# Patient Record
Sex: FEMALE | Race: BLACK OR AFRICAN AMERICAN | NOT HISPANIC OR LATINO | URBAN - METROPOLITAN AREA
[De-identification: names, ages, dates, MRNs, and addresses within clinical notes are randomized per-mention and may not be internally consistent; named-entity substitution may affect disease eponyms.]

---

## 2022-04-19 ENCOUNTER — OFFICE VISIT (OUTPATIENT)
Dept: FAMILY MEDICINE | Facility: CLINIC | Age: 77
End: 2022-04-19
Payer: COMMERCIAL

## 2022-04-19 VITALS
TEMPERATURE: 96 F | SYSTOLIC BLOOD PRESSURE: 128 MMHG | WEIGHT: 136 LBS | OXYGEN SATURATION: 99 % | HEIGHT: 69 IN | DIASTOLIC BLOOD PRESSURE: 74 MMHG | HEART RATE: 79 BPM | BODY MASS INDEX: 20.14 KG/M2

## 2022-04-19 DIAGNOSIS — E11.59 HYPERTENSION ASSOCIATED WITH DIABETES: ICD-10-CM

## 2022-04-19 DIAGNOSIS — E11.65 TYPE 2 DIABETES MELLITUS WITH HYPERGLYCEMIA, WITHOUT LONG-TERM CURRENT USE OF INSULIN: ICD-10-CM

## 2022-04-19 DIAGNOSIS — Z00.00 ENCOUNTER FOR MEDICAL EXAMINATION TO ESTABLISH CARE: Primary | ICD-10-CM

## 2022-04-19 DIAGNOSIS — E78.5 HYPERLIPIDEMIA ASSOCIATED WITH TYPE 2 DIABETES MELLITUS: ICD-10-CM

## 2022-04-19 DIAGNOSIS — I15.2 HYPERTENSION ASSOCIATED WITH DIABETES: ICD-10-CM

## 2022-04-19 DIAGNOSIS — N18.31 STAGE 3A CHRONIC KIDNEY DISEASE: ICD-10-CM

## 2022-04-19 DIAGNOSIS — E11.69 HYPERLIPIDEMIA ASSOCIATED WITH TYPE 2 DIABETES MELLITUS: ICD-10-CM

## 2022-04-19 DIAGNOSIS — Z79.899 ENCOUNTER FOR LONG-TERM (CURRENT) USE OF MEDICATIONS: ICD-10-CM

## 2022-04-19 PROBLEM — E44.0 PROTEIN-CALORIE MALNUTRITION, MODERATE: Status: ACTIVE | Noted: 2020-02-24

## 2022-04-19 PROBLEM — I50.32 CHRONIC DIASTOLIC CONGESTIVE HEART FAILURE: Status: ACTIVE | Noted: 2021-09-28

## 2022-04-19 PROBLEM — R30.0 DYSURIA: Status: ACTIVE | Noted: 2017-04-13

## 2022-04-19 PROBLEM — G31.84 MILD COGNITIVE IMPAIRMENT: Status: ACTIVE | Noted: 2019-12-18

## 2022-04-19 PROBLEM — R63.4 UNEXPLAINED WEIGHT LOSS: Status: ACTIVE | Noted: 2022-04-19

## 2022-04-19 PROBLEM — Z87.09 HISTORY OF ASTHMA: Status: ACTIVE | Noted: 2021-06-29

## 2022-04-19 PROBLEM — N17.9 ACUTE RENAL FAILURE: Status: ACTIVE | Noted: 2022-03-19

## 2022-04-19 PROBLEM — I25.2 OLD MYOCARDIAL INFARCTION: Status: ACTIVE | Noted: 2020-06-08

## 2022-04-19 PROBLEM — G43.909 MIGRAINE: Status: ACTIVE | Noted: 2019-07-25

## 2022-04-19 PROBLEM — M54.32 SCIATICA, LEFT SIDE: Status: ACTIVE | Noted: 2021-07-29

## 2022-04-19 PROBLEM — I11.9 BENIGN HYPERTENSIVE HEART DISEASE: Status: ACTIVE | Noted: 2020-02-21

## 2022-04-19 PROBLEM — M10.9 GOUT: Status: ACTIVE | Noted: 2022-04-19

## 2022-04-19 PROBLEM — N39.0 SEPSIS DUE TO GRAM-NEGATIVE UTI: Status: ACTIVE | Noted: 2021-06-29

## 2022-04-19 PROBLEM — R55 SYNCOPE: Status: ACTIVE | Noted: 2020-02-21

## 2022-04-19 PROBLEM — J96.01 ACUTE RESPIRATORY FAILURE WITH HYPOXIA: Status: ACTIVE | Noted: 2020-06-10

## 2022-04-19 PROBLEM — N17.9 AKI (ACUTE KIDNEY INJURY): Status: ACTIVE | Noted: 2019-12-15

## 2022-04-19 PROBLEM — M79.609 PAIN IN LIMB: Status: ACTIVE | Noted: 2022-04-19

## 2022-04-19 PROBLEM — E78.2 MIXED HYPERLIPIDEMIA: Status: ACTIVE | Noted: 2020-02-21

## 2022-04-19 PROBLEM — E83.42 HYPOMAGNESEMIA: Status: ACTIVE | Noted: 2020-02-21

## 2022-04-19 PROBLEM — Z95.0 CARDIAC PACEMAKER IN SITU: Status: ACTIVE | Noted: 2020-09-02

## 2022-04-19 PROBLEM — R03.0 ELEVATED BLOOD-PRESSURE READING WITHOUT DIAGNOSIS OF HYPERTENSION: Status: ACTIVE | Noted: 2022-04-19

## 2022-04-19 PROBLEM — I50.9 ACUTE ON CHRONIC CONGESTIVE HEART FAILURE: Status: ACTIVE | Noted: 2020-06-16

## 2022-04-19 PROBLEM — R29.6 FREQUENT FALLS: Status: ACTIVE | Noted: 2021-06-28

## 2022-04-19 PROBLEM — R00.2 PALPITATIONS: Status: ACTIVE | Noted: 2022-04-19

## 2022-04-19 PROBLEM — R40.2430 GLASGOW COMA SCALE TOTAL SCORE 3-8: Status: ACTIVE | Noted: 2020-02-22

## 2022-04-19 PROBLEM — I10 BENIGN HYPERTENSION: Status: ACTIVE | Noted: 2018-04-18

## 2022-04-19 PROBLEM — R40.4 TRANSIENT ALTERATION OF AWARENESS: Status: ACTIVE | Noted: 2017-12-29

## 2022-04-19 PROBLEM — I63.9 CEREBROVASCULAR ACCIDENT (CVA): Status: ACTIVE | Noted: 2022-04-19

## 2022-04-19 PROBLEM — A41.50 SEPSIS DUE TO GRAM-NEGATIVE UTI: Status: ACTIVE | Noted: 2021-06-29

## 2022-04-19 PROBLEM — N39.0 ACUTE UTI: Status: ACTIVE | Noted: 2021-05-29

## 2022-04-19 PROBLEM — Z91.148 NONCOMPLIANCE WITH MEDICATION REGIMEN: Status: ACTIVE | Noted: 2018-04-18

## 2022-04-19 PROBLEM — Z86.73 HISTORY OF CVA (CEREBROVASCULAR ACCIDENT): Status: ACTIVE | Noted: 2019-12-18

## 2022-04-19 PROBLEM — E78.00 PURE HYPERCHOLESTEROLEMIA: Status: ACTIVE | Noted: 2018-04-18

## 2022-04-19 PROBLEM — R53.1 GENERALIZED WEAKNESS: Status: ACTIVE | Noted: 2021-05-29

## 2022-04-19 PROBLEM — I67.89 ISCHEMIC ENCEPHALOPATHY: Status: ACTIVE | Noted: 2018-01-04

## 2022-04-19 PROBLEM — E87.1 HYPONATREMIA: Status: ACTIVE | Noted: 2019-12-15

## 2022-04-19 PROBLEM — K21.9 GASTROESOPHAGEAL REFLUX DISEASE WITHOUT ESOPHAGITIS: Status: ACTIVE | Noted: 2021-06-29

## 2022-04-19 PROBLEM — G93.41 METABOLIC ENCEPHALOPATHY: Status: ACTIVE | Noted: 2020-02-22

## 2022-04-19 PROBLEM — S42.402A LEFT ELBOW FRACTURE, CLOSED, INITIAL ENCOUNTER: Status: ACTIVE | Noted: 2021-06-29

## 2022-04-19 PROBLEM — I25.10 CORONARY ARTERIOSCLEROSIS IN NATIVE ARTERY: Status: ACTIVE | Noted: 2018-04-18

## 2022-04-19 PROCEDURE — 3078F PR MOST RECENT DIASTOLIC BLOOD PRESSURE < 80 MM HG: ICD-10-PCS | Mod: CPTII,S$GLB,, | Performed by: FAMILY MEDICINE

## 2022-04-19 PROCEDURE — 1159F PR MEDICATION LIST DOCUMENTED IN MEDICAL RECORD: ICD-10-PCS | Mod: CPTII,S$GLB,, | Performed by: FAMILY MEDICINE

## 2022-04-19 PROCEDURE — 3074F PR MOST RECENT SYSTOLIC BLOOD PRESSURE < 130 MM HG: ICD-10-PCS | Mod: CPTII,S$GLB,, | Performed by: FAMILY MEDICINE

## 2022-04-19 PROCEDURE — 1160F PR REVIEW ALL MEDS BY PRESCRIBER/CLIN PHARMACIST DOCUMENTED: ICD-10-PCS | Mod: CPTII,S$GLB,, | Performed by: FAMILY MEDICINE

## 2022-04-19 PROCEDURE — 1101F PT FALLS ASSESS-DOCD LE1/YR: CPT | Mod: CPTII,S$GLB,, | Performed by: FAMILY MEDICINE

## 2022-04-19 PROCEDURE — 1160F RVW MEDS BY RX/DR IN RCRD: CPT | Mod: CPTII,S$GLB,, | Performed by: FAMILY MEDICINE

## 2022-04-19 PROCEDURE — 99999 PR PBB SHADOW E&M-NEW PATIENT-LVL V: ICD-10-PCS | Mod: PBBFAC,,, | Performed by: FAMILY MEDICINE

## 2022-04-19 PROCEDURE — 3074F SYST BP LT 130 MM HG: CPT | Mod: CPTII,S$GLB,, | Performed by: FAMILY MEDICINE

## 2022-04-19 PROCEDURE — 1101F PR PT FALLS ASSESS DOC 0-1 FALLS W/OUT INJ PAST YR: ICD-10-PCS | Mod: CPTII,S$GLB,, | Performed by: FAMILY MEDICINE

## 2022-04-19 PROCEDURE — 99204 OFFICE O/P NEW MOD 45 MIN: CPT | Mod: S$GLB,,, | Performed by: FAMILY MEDICINE

## 2022-04-19 PROCEDURE — 3078F DIAST BP <80 MM HG: CPT | Mod: CPTII,S$GLB,, | Performed by: FAMILY MEDICINE

## 2022-04-19 PROCEDURE — 3288F FALL RISK ASSESSMENT DOCD: CPT | Mod: CPTII,S$GLB,, | Performed by: FAMILY MEDICINE

## 2022-04-19 PROCEDURE — 1126F PR PAIN SEVERITY QUANTIFIED, NO PAIN PRESENT: ICD-10-PCS | Mod: CPTII,S$GLB,, | Performed by: FAMILY MEDICINE

## 2022-04-19 PROCEDURE — 1159F MED LIST DOCD IN RCRD: CPT | Mod: CPTII,S$GLB,, | Performed by: FAMILY MEDICINE

## 2022-04-19 PROCEDURE — 1126F AMNT PAIN NOTED NONE PRSNT: CPT | Mod: CPTII,S$GLB,, | Performed by: FAMILY MEDICINE

## 2022-04-19 PROCEDURE — 99204 PR OFFICE/OUTPT VISIT, NEW, LEVL IV, 45-59 MIN: ICD-10-PCS | Mod: S$GLB,,, | Performed by: FAMILY MEDICINE

## 2022-04-19 PROCEDURE — 3288F PR FALLS RISK ASSESSMENT DOCUMENTED: ICD-10-PCS | Mod: CPTII,S$GLB,, | Performed by: FAMILY MEDICINE

## 2022-04-19 PROCEDURE — 99999 PR PBB SHADOW E&M-NEW PATIENT-LVL V: CPT | Mod: PBBFAC,,, | Performed by: FAMILY MEDICINE

## 2022-04-19 RX ORDER — ATORVASTATIN CALCIUM 80 MG/1
80 TABLET, FILM COATED ORAL DAILY
COMMUNITY
Start: 2022-03-23 | End: 2023-02-08 | Stop reason: SDUPTHER

## 2022-04-19 RX ORDER — ASPIRIN 81 MG/1
TABLET ORAL
COMMUNITY
Start: 2021-07-03 | End: 2022-07-19 | Stop reason: SDUPTHER

## 2022-04-19 RX ORDER — LANCETS
EACH MISCELLANEOUS
COMMUNITY
Start: 2022-03-02 | End: 2022-10-05 | Stop reason: SDUPTHER

## 2022-04-19 RX ORDER — CARVEDILOL 25 MG/1
TABLET ORAL
COMMUNITY
End: 2022-07-19

## 2022-04-19 RX ORDER — IRBESARTAN AND HYDROCHLOROTHIAZIDE 300; 12.5 MG/1; MG/1
TABLET, FILM COATED ORAL
COMMUNITY

## 2022-04-19 RX ORDER — FESOTERODINE FUMARATE 4 MG/1
4 TABLET, FILM COATED, EXTENDED RELEASE ORAL DAILY
COMMUNITY
Start: 2022-04-12 | End: 2023-05-03

## 2022-04-19 RX ORDER — SEMAGLUTIDE 1.34 MG/ML
INJECTION, SOLUTION SUBCUTANEOUS
Qty: 1 PEN | Refills: 0 | Status: SHIPPED | OUTPATIENT
Start: 2022-04-19 | End: 2022-05-13

## 2022-04-19 RX ORDER — METFORMIN HYDROCHLORIDE 1000 MG/1
1000 TABLET ORAL 2 TIMES DAILY
COMMUNITY
Start: 2022-03-02 | End: 2024-01-19 | Stop reason: SDUPTHER

## 2022-04-19 RX ORDER — BLOOD SUGAR DIAGNOSTIC
1 STRIP MISCELLANEOUS 3 TIMES DAILY
COMMUNITY
Start: 2022-03-02 | End: 2022-10-05 | Stop reason: SDUPTHER

## 2022-04-19 RX ORDER — SITAGLIPTIN 100 MG/1
TABLET, FILM COATED ORAL
COMMUNITY
Start: 2022-03-09 | End: 2022-07-19

## 2022-04-19 RX ORDER — GLIPIZIDE 10 MG/1
10 TABLET, FILM COATED, EXTENDED RELEASE ORAL 2 TIMES DAILY
COMMUNITY
Start: 2022-03-30 | End: 2023-01-09 | Stop reason: ALTCHOICE

## 2022-04-19 NOTE — ASSESSMENT & PLAN NOTE
Follow-up with Cardiology and establish care with Nephrology.  Counseled on importance of hypertension disease course, I recommend ongoing Education for DASH-diet and exercise.  Counseled on medication regimen importance of treating high blood pressure.  Please be advised of risk of untreated blood pressure as discussed.  Please advised of ER precautions were given for symptoms of hypertensive urgency and emergency.

## 2022-04-19 NOTE — ASSESSMENT & PLAN NOTE
Referral to Nephrology.  Patient with CKD stage 3 comorbidities hypertension diabetes.  Check urine microalbumin.  Avoid anti-inflammatory medications.  Need better control of diabetes.  See problem.

## 2022-04-19 NOTE — ASSESSMENT & PLAN NOTE
Start OZEMPIC.  Recheck A1c in three months.  Extensive counseling on lifestyle modification with diet changes consistent with low carb diabetes diet.We will plan to monitor hemoglobin A1c at designated intervals 3 to 6 months.  I recommend ongoing Education for diabetic diet and exercise protocol.  We will continue to monitor for side effects.    Please be advised of symptoms to monitor for and to notify me immediately if persistent or worsening.  Follow up with Ophthalmology/Optometry and Podiatry at least annually.

## 2022-04-19 NOTE — PROGRESS NOTES
PLAN:      Problem List Items Addressed This Visit     Hypertension associated with diabetes (Chronic)     Follow-up with Cardiology and establish care with Nephrology.  Counseled on importance of hypertension disease course, I recommend ongoing Education for DASH-diet and exercise.  Counseled on medication regimen importance of treating high blood pressure.  Please be advised of risk of untreated blood pressure as discussed.  Please advised of ER precautions were given for symptoms of hypertensive urgency and emergency.             Relevant Medications    metFORMIN (GLUCOPHAGE) 1000 MG tablet    glipiZIDE (GLUCOTROL) 10 MG TR24    JANUVIA 100 mg Tab    semaglutide (OZEMPIC) 0.25 mg or 0.5 mg(2 mg/1.5 mL) pen injector    Other Relevant Orders    Hemoglobin    Comprehensive Metabolic Panel    Lipid Panel    Hemoglobin A1C    Microalbumin/Creatinine Ratio, Urine    Stage 3a chronic kidney disease (Chronic)     Referral to Nephrology.  Patient with CKD stage 3 comorbidities hypertension diabetes.  Check urine microalbumin.  Avoid anti-inflammatory medications.  Need better control of diabetes.  See problem.           Relevant Orders    Ambulatory referral/consult to Nephrology    Microalbumin/Creatinine Ratio, Urine    Type 2 diabetes mellitus with hyperglycemia, without long-term current use of insulin (Chronic)     Start OZEMPIC.  Recheck A1c in three months.  Extensive counseling on lifestyle modification with diet changes consistent with low carb diabetes diet.We will plan to monitor hemoglobin A1c at designated intervals 3 to 6 months.  I recommend ongoing Education for diabetic diet and exercise protocol.  We will continue to monitor for side effects.    Please be advised of symptoms to monitor for and to notify me immediately if persistent or worsening.  Follow up with Ophthalmology/Optometry and Podiatry at least annually.             Relevant Medications    metFORMIN (GLUCOPHAGE) 1000 MG tablet    glipiZIDE  (GLUCOTROL) 10 MG TR24    JANUVIA 100 mg Tab    semaglutide (OZEMPIC) 0.25 mg or 0.5 mg(2 mg/1.5 mL) pen injector    Other Relevant Orders    Hemoglobin A1C    Hemoglobin    Comprehensive Metabolic Panel    Lipid Panel    Hemoglobin A1C    Microalbumin/Creatinine Ratio, Urine    Encounter for long-term (current) use of medications (Chronic)     Complete history and physical was completed today.  Complete and thorough medication reconciliation was performed.  Discussed risks and benefits of medications.  Advised patient on orders and health maintenance.  We discussed old records and old labs if available.  Will request any records not available through epic.  Continue current medications listed on your summary sheet.             Relevant Orders    Hemoglobin    Comprehensive Metabolic Panel    Lipid Panel    Hemoglobin A1C    Microalbumin/Creatinine Ratio, Urine    Hyperlipidemia associated with type 2 diabetes mellitus (Chronic)     Counseled on hyperlipidemia disease course, healthy diet and increased need for exercise.  Please be advised of the risk of cardiovascular disease, increase stroke and heart attack risk with uncontrolled/untreated hyperlipidemia.     Patient voiced understanding and understood the treatment plan. All questions were answered.                Relevant Medications    metFORMIN (GLUCOPHAGE) 1000 MG tablet    glipiZIDE (GLUCOTROL) 10 MG TR24    JANUVIA 100 mg Tab    semaglutide (OZEMPIC) 0.25 mg or 0.5 mg(2 mg/1.5 mL) pen injector    Other Relevant Orders    Hemoglobin    Comprehensive Metabolic Panel    Lipid Panel    Hemoglobin A1C    Microalbumin/Creatinine Ratio, Urine    Encounter for medical examination to establish care - Primary     Requesting records.  Update labs in three months.  Follow-up after blood work for review.           Relevant Orders    Hemoglobin    Comprehensive Metabolic Panel    Lipid Panel    Hemoglobin A1C        Future Appointments     Date Provider Specialty Appt  Notes    4/22/2022 Jose Toney, DO Nephrology          Medication Management for assessment above:   Medication List with Changes/Refills   New Medications    SEMAGLUTIDE (OZEMPIC) 0.25 MG OR 0.5 MG(2 MG/1.5 ML) PEN INJECTOR    Inject 0.25 mg into the skin once a week for 30 days, THEN 0.5 mg once a week.   Current Medications    ACCU-CHEK AMELIA PLUS METER MISC    Use 1 device  three times a day    ACCU-CHEK AMELIA PLUS TEST STRP STRP    1 strip 3 (three) times daily.    ASPIRIN (ECOTRIN) 81 MG EC TABLET    Aspir-81 mg tablet,delayed release   Take 1 tablet every day by oral route.    ATORVASTATIN (LIPITOR) 80 MG TABLET    Take 80 mg by mouth once daily.    CARVEDILOL (COREG) 25 MG TABLET    carvedilol 25 mg tablet   TAKE 1 TABLET BY MOUTH TWICE DAILY    GLIPIZIDE (GLUCOTROL) 10 MG TR24    Take 10 mg by mouth 2 (two) times daily.    IRBESARTAN-HYDROCHLOROTHIAZIDE (AVALIDE) 300-12.5 MG PER TABLET    irbesartan 300 mg-hydrochlorothiazide 12.5 mg tablet   TAKE 1 TABLET BY MOUTH EVERY DAY    JANUVIA 100 MG TAB    TAKE 1 TABLET BY MOUTH EVERY DAY FOR DIABETES    LANCETS, SUPER THIN MISC    TEST BLOOD SUGAR THREE TIMES DAILY AS DIRECTED    METFORMIN (GLUCOPHAGE) 1000 MG TABLET    Take 1,000 mg by mouth 2 (two) times daily.    TOVIAZ 4 MG TB24    Take 4 mg by mouth once daily.       Antoni Patel M.D.  ==========================================================================  Subjective:   Patient ID: Marianne Cali is a 76 y.o. female.  has a past medical history of Allergy, Cataract, Dementia, Depression, Diabetes mellitus, type 2, Heart murmur, Hypertension, and Stroke.   Chief Complaint: Establish Care      Problem List Items Addressed This Visit     Hypertension associated with diabetes (Chronic)    Overview     CHRONIC. STABLE. BP Reviewed.  Compliant with BP medications. No SE reported.   (-) CP, SOB, palpitations, dizziness, lightheadedness, HA, arm numbness, tingling or weakness, syncope.  No results  found for: CREATININE             Current Assessment & Plan     Follow-up with Cardiology and establish care with Nephrology.  Counseled on importance of hypertension disease course, I recommend ongoing Education for DASH-diet and exercise.  Counseled on medication regimen importance of treating high blood pressure.  Please be advised of risk of untreated blood pressure as discussed.  Please advised of ER precautions were given for symptoms of hypertensive urgency and emergency.             Stage 3a chronic kidney disease (Chronic)    Overview     Chronic.  Control is uncertain.  Patient with comorbid hypertension and diabetes.  Diabetes has been uncontrolled.  BMP  No results found for: NA, K, CL, CO2, BUN, CREATININE, CALCIUM, ANIONGAP, ESTGFRAFRICA, EGFRNONAA             Current Assessment & Plan     Referral to Nephrology.  Patient with CKD stage 3 comorbidities hypertension diabetes.  Check urine microalbumin.  Avoid anti-inflammatory medications.  Need better control of diabetes.  See problem.           Type 2 diabetes mellitus with hyperglycemia, without long-term current use of insulin (Chronic)    Overview     Diabetes Management Status    Statin: Taking  ACE/ARB: Taking    Screening or Prevention Patient's value Goal Complete/Controlled?   HgA1C Testing and Control   Lab Results   Component Value Date    HGBA1C 10.8 (H) 04/13/2022      Annually/Less than 8% No   Lipid profile : 04/13/2022 Annually No   LDL control Lab Results   Component Value Date    LDLCALC 85 04/13/2022    Annually/Less than 100 mg/dl  Yes   Nephropathy screening No results found for: LABMICR  No results found for: PROTEINUA  No results found for: UTPCR   Annually No   Blood pressure BP Readings from Last 1 Encounters:   04/19/22 128/74    Less than 140/90 Yes   Dilated retinal exam Most Recent Eye Exam Date: Not Found Annually No   Foot exam   Most Recent Foot Exam Date: Not Found Annually No     April 2022:  Patient with uncontrolled  A1c from last labs performed [facility].  Currently on metformin 1000 milligrams twice daily, Januvia 100 milligrams, glipizide 10 milligrams twice daily.  Noncompliance with diet.  Denies history of pancreatitis, thyroid cancer, MEN syndrome.           Current Assessment & Plan     Start OZEMPIC.  Recheck A1c in three months.  Extensive counseling on lifestyle modification with diet changes consistent with low carb diabetes diet.We will plan to monitor hemoglobin A1c at designated intervals 3 to 6 months.  I recommend ongoing Education for diabetic diet and exercise protocol.  We will continue to monitor for side effects.    Please be advised of symptoms to monitor for and to notify me immediately if persistent or worsening.  Follow up with Ophthalmology/Optometry and Podiatry at least annually.             Encounter for long-term (current) use of medications (Chronic)    Overview     CHRONIC. Stable. Compliant with medications for managed conditions. See medication list. No SE reported.   Routine lab analysis is being monitored. Refills were addressed.  No results found for: WBC, HGB, HCT, MCV, PLT      Chemistry    No results found for: NA, K, CL, CO2, BUN, CREATININE, GLU No results found for: CALCIUM, ALKPHOS, AST, ALT, BILITOT, ESTGFRAFRICA, EGFRNONAA       No results found for: TSH, W4DKRTD, W6FAQDY, THYROIDAB, FREET4, T3FREE             Current Assessment & Plan     Complete history and physical was completed today.  Complete and thorough medication reconciliation was performed.  Discussed risks and benefits of medications.  Advised patient on orders and health maintenance.  We discussed old records and old labs if available.  Will request any records not available through epic.  Continue current medications listed on your summary sheet.             Hyperlipidemia associated with type 2 diabetes mellitus (Chronic)    Overview     CHRONIC. STABLE. Lab analysis reviewed.   (-) CP, SOB, abdominal pain, N/V/D,  constipation, jaundice, skin changes.  (-) Myalgias  Lab Results   Component Value Date    CHOL 158 04/13/2022    CHOL 136 06/29/2021    CHOL 104 06/10/2020     Lab Results   Component Value Date    HDL 23 (L) 04/13/2022    HDL 33 06/29/2021    HDL 26 (L) 06/10/2020     Lab Results   Component Value Date    LDLCALC 85 04/13/2022    LDLCALC 81 06/29/2021    LDLCALC 64 06/10/2020     Lab Results   Component Value Date    TRIG 249 (H) 04/13/2022    TRIG 111 06/29/2021    TRIG 68 06/10/2020     Lab Results   Component Value Date    CHOLHDL 6.9 (H) 04/13/2022    CHOLHDL 4.1 06/29/2021    CHOLHDL 4.0 06/10/2020     No results found for: TOTALCHOLEST  No results found for: ALT, AST, GGT, ALKPHOS, BILITOT  ======================================================             Current Assessment & Plan     Counseled on hyperlipidemia disease course, healthy diet and increased need for exercise.  Please be advised of the risk of cardiovascular disease, increase stroke and heart attack risk with uncontrolled/untreated hyperlipidemia.     Patient voiced understanding and understood the treatment plan. All questions were answered.                Encounter for medical examination to establish care - Primary    Overview     New patient transitioning care from prev pcp: Dr. Webster   Cardiology: Frederick Kimball  Pulmonology: Cade Lozano MD   Neurology: Obinna Rhodes MD Dr. Chiang   Stroke 2019  ortho 2 hip replacements              Current Assessment & Plan     Requesting records.  Update labs in three months.  Follow-up after blood work for review.                  Review of patient's allergies indicates:   Allergen Reactions    Morphine Anxiety     Current Outpatient Medications   Medication Instructions    ACCU-CHEK AMELIA PLUS METER Misc Use 1 device  three times a day    ACCU-CHEK AMELIA PLUS TEST STRP Strp 1 strip, 3 times daily    aspirin (ECOTRIN) 81 MG EC tablet Aspir-81 mg tablet,delayed release   Take 1  "tablet every day by oral route.    atorvastatin (LIPITOR) 80 mg, Oral, Daily    carvediloL (COREG) 25 MG tablet carvedilol 25 mg tablet   TAKE 1 TABLET BY MOUTH TWICE DAILY    glipiZIDE (GLUCOTROL) 10 mg, Oral, 2 times daily    irbesartan-hydrochlorothiazide (AVALIDE) 300-12.5 mg per tablet irbesartan 300 mg-hydrochlorothiazide 12.5 mg tablet   TAKE 1 TABLET BY MOUTH EVERY DAY    JANUVIA 100 mg Tab TAKE 1 TABLET BY MOUTH EVERY DAY FOR DIABETES    LANCETS, SUPER THIN Misc TEST BLOOD SUGAR THREE TIMES DAILY AS DIRECTED    metFORMIN (GLUCOPHAGE) 1,000 mg, Oral, 2 times daily    semaglutide (OZEMPIC) 0.25 mg or 0.5 mg(2 mg/1.5 mL) pen injector Inject 0.25 mg into the skin once a week for 30 days, THEN 0.5 mg once a week.    TOVIAZ 4 mg, Oral, Daily      I have reviewed the PMH, social history, FamilyHx, surgical history, allergies and medications documented / confirmed by the patient at the time of this visit.  Review of Systems   Constitutional: Positive for appetite change and fatigue. Negative for chills, fever and unexpected weight change.   HENT: Negative for ear pain and sore throat.    Eyes: Negative for redness and visual disturbance.   Respiratory: Negative for cough and shortness of breath.    Cardiovascular: Negative for chest pain and palpitations.   Gastrointestinal: Negative for nausea and vomiting.   Genitourinary: Negative for difficulty urinating and hematuria.   Musculoskeletal: Positive for arthralgias and myalgias.   Skin: Negative for rash and wound.   Neurological: Positive for weakness (Chronic left side after stroke in the 80s). Negative for headaches.   Psychiatric/Behavioral: Negative for sleep disturbance. The patient is not nervous/anxious.      Objective:   /74   Pulse 79   Temp 96.4 °F (35.8 °C) (Other (see comments))   Ht 5' 9" (1.753 m)   Wt 61.7 kg (136 lb)   SpO2 99%   BMI 20.08 kg/m²   Physical Exam  Vitals and nursing note reviewed.   Constitutional:       " General: She is not in acute distress.     Appearance: She is well-developed. She is not ill-appearing, toxic-appearing or diaphoretic.   HENT:      Head: Normocephalic and atraumatic.      Right Ear: Hearing and external ear normal.      Left Ear: Hearing and external ear normal.      Nose: Nose normal. No rhinorrhea.   Eyes:      General: Lids are normal.      Extraocular Movements: Extraocular movements intact.      Conjunctiva/sclera: Conjunctivae normal.      Pupils: Pupils are equal, round, and reactive to light.   Cardiovascular:      Rate and Rhythm: Normal rate.      Pulses: Normal pulses.   Pulmonary:      Effort: Pulmonary effort is normal. No respiratory distress.      Breath sounds: Normal breath sounds.   Abdominal:      General: Bowel sounds are normal.      Palpations: Abdomen is soft.   Musculoskeletal:         General: Normal range of motion.      Cervical back: Normal range of motion and neck supple.   Skin:     General: Skin is warm and dry.      Capillary Refill: Capillary refill takes less than 2 seconds.      Coloration: Skin is not pale.   Neurological:      General: No focal deficit present.      Mental Status: She is alert and oriented to person, place, and time. She is not disoriented.      Cranial Nerves: No cranial nerve deficit.      Motor: Weakness present.      Gait: Gait abnormal (Walks with quad cane).   Psychiatric:         Attention and Perception: She is attentive.         Mood and Affect: Mood normal. Mood is not anxious or depressed.         Speech: Speech is not rapid and pressured or slurred.         Behavior: Behavior normal. Behavior is not agitated, aggressive or hyperactive. Behavior is cooperative.         Thought Content: Thought content normal. Thought content is not paranoid or delusional. Thought content does not include homicidal or suicidal ideation. Thought content does not include homicidal or suicidal plan.         Cognition and Memory: Memory is not impaired.          Judgment: Judgment normal.        Patient is wearing a mask which limits physical exam due to COVID restrictions.   Assessment:     1. Encounter for medical examination to establish care    2. Encounter for long-term (current) use of medications    3. Type 2 diabetes mellitus with hyperglycemia, without long-term current use of insulin    4. Hypertension associated with diabetes    5. Hyperlipidemia associated with type 2 diabetes mellitus    6. Stage 3a chronic kidney disease      MDM:   New patient.  Moderate to high complexity.  Moderate high risk.  Total time: 54 minutes.  This includes total time spent on the encounter, which includes face to face time and non-face to face time preparing to see the patient (eg, review of previous medical records, tests), Obtaining and/or reviewing separately obtained history, documenting clinical information in the electronic or other health record, independently interpreting results (not separately reported)/communicating results to the patient/family/caregiver, and/or care coordination (not separately reported).    I have Reviewed and summarized old records.  I have performed thorough medication reconciliation today and discussed risk and benefits of medications.  I have reviewed labs and discussed with patient.  All questions were answered.  I am requesting old records and will review them once they are available.    I have signed for the following orders AND/OR meds.  Orders Placed This Encounter   Procedures    Hemoglobin A1C     Standing Status:   Future     Standing Expiration Date:   6/18/2023    Hemoglobin     Standing Status:   Standing     Number of Occurrences:   99     Standing Expiration Date:   5/15/2041    Comprehensive Metabolic Panel     Standing Status:   Standing     Number of Occurrences:   99     Standing Expiration Date:   5/15/2041    Lipid Panel     Standing Status:   Standing     Number of Occurrences:   99     Standing Expiration Date:    5/15/2041    Hemoglobin A1C     Standing Status:   Standing     Number of Occurrences:   99     Standing Expiration Date:   5/15/2041    Microalbumin/Creatinine Ratio, Urine     Standing Status:   Future     Standing Expiration Date:   6/18/2023     Order Specific Question:   Specimen Source     Answer:   Urine    Ambulatory referral/consult to Nephrology     Standing Status:   Future     Standing Expiration Date:   5/19/2023     Referral Priority:   Routine     Referral Type:   Consultation     Referral Reason:   Specialty Services Required     Requested Specialty:   Nephrology     Number of Visits Requested:   1     Medications Ordered This Encounter   Medications    semaglutide (OZEMPIC) 0.25 mg or 0.5 mg(2 mg/1.5 mL) pen injector     Sig: Inject 0.25 mg into the skin once a week for 30 days, THEN 0.5 mg once a week.     Dispense:  1 pen     Refill:  0        Follow up in about 3 months (around 7/19/2022), or if symptoms worsen or fail to improve, for Annual Wellness Exam.    If no improvement in symptoms or symptoms worsen, advised to call/follow-up at clinic or go to ER. Patient voiced understanding and all questions/concerns were addressed.   DISCLAIMER: This note was compiled by using a speech recognition dictation system and therefore please be aware that typographical / speech recognition errors can and do occur.  Please contact me if you see any errors specifically.    Antoni Patel M.D.       Office: [phone] [address] [place] [address]  FAX: [phone]

## 2022-04-19 NOTE — PATIENT INSTRUCTIONS
Follow up in about 3 months (around 7/19/2022), or if symptoms worsen or fail to improve, for Annual Wellness Exam.     Dear patient,   As a result of recent federal legislation (The Federal Cures Act), you may receive lab or pathology results from your visit in your [facility] account before your physician is able to contact you. Your physician or their representative will relay the results to you with their recommendations at their soonest availability.     If no improvement in symptoms or symptoms worsen, please be advised to call MD, follow-up at clinic and/or go to ER if becomes severe.    Antoni Patel M.D.        We Offer TELEHEALTH & Same Day Appointments!   Book your Telehealth appointment with me through my nurse or   Clinic appointments on [facility]!    [address] [place] [address]    Office: [phone]   FAX: [phone]    Check out my Facebook Page and Follow Me at: [email or web address]    Check out my website at [email or web address] by clicking on: [email or web address]    To Schedule appointments [email or web address] go to [facility]: [email or web address]

## 2022-04-22 ENCOUNTER — TELEPHONE (OUTPATIENT)
Dept: NEPHROLOGY | Facility: CLINIC | Age: 77
End: 2022-04-22

## 2022-04-22 ENCOUNTER — OFFICE VISIT (OUTPATIENT)
Dept: NEPHROLOGY | Facility: CLINIC | Age: 77
End: 2022-04-22
Payer: MEDICARE

## 2022-04-22 VITALS
SYSTOLIC BLOOD PRESSURE: 150 MMHG | WEIGHT: 136 LBS | OXYGEN SATURATION: 95 % | HEART RATE: 72 BPM | HEIGHT: 69 IN | BODY MASS INDEX: 20.14 KG/M2 | DIASTOLIC BLOOD PRESSURE: 80 MMHG

## 2022-04-22 DIAGNOSIS — M10.9 GOUT, UNSPECIFIED CAUSE, UNSPECIFIED CHRONICITY, UNSPECIFIED SITE: ICD-10-CM

## 2022-04-22 DIAGNOSIS — D64.9 CHRONIC ANEMIA: ICD-10-CM

## 2022-04-22 DIAGNOSIS — Z95.0 CARDIAC PACEMAKER IN SITU: ICD-10-CM

## 2022-04-22 DIAGNOSIS — R55 SYNCOPE, UNSPECIFIED SYNCOPE TYPE: ICD-10-CM

## 2022-04-22 DIAGNOSIS — I15.2 HYPERTENSION ASSOCIATED WITH DIABETES: Chronic | ICD-10-CM

## 2022-04-22 DIAGNOSIS — E11.65 TYPE 2 DIABETES MELLITUS WITH HYPERGLYCEMIA, WITHOUT LONG-TERM CURRENT USE OF INSULIN: ICD-10-CM

## 2022-04-22 DIAGNOSIS — N18.31 STAGE 3A CHRONIC KIDNEY DISEASE: Primary | ICD-10-CM

## 2022-04-22 DIAGNOSIS — E11.59 HYPERTENSION ASSOCIATED WITH DIABETES: Chronic | ICD-10-CM

## 2022-04-22 DIAGNOSIS — I50.32 CHRONIC DIASTOLIC CONGESTIVE HEART FAILURE: ICD-10-CM

## 2022-04-22 PROCEDURE — 99214 OFFICE O/P EST MOD 30 MIN: CPT | Mod: PBBFAC,PN | Performed by: INTERNAL MEDICINE

## 2022-04-22 PROCEDURE — 3077F PR MOST RECENT SYSTOLIC BLOOD PRESSURE >= 140 MM HG: ICD-10-PCS | Mod: CPTII,S$GLB,, | Performed by: INTERNAL MEDICINE

## 2022-04-22 PROCEDURE — 3288F PR FALLS RISK ASSESSMENT DOCUMENTED: ICD-10-PCS | Mod: CPTII,S$GLB,, | Performed by: INTERNAL MEDICINE

## 2022-04-22 PROCEDURE — 1101F PR PT FALLS ASSESS DOC 0-1 FALLS W/OUT INJ PAST YR: ICD-10-PCS | Mod: CPTII,S$GLB,, | Performed by: INTERNAL MEDICINE

## 2022-04-22 PROCEDURE — 99204 PR OFFICE/OUTPT VISIT, NEW, LEVL IV, 45-59 MIN: ICD-10-PCS | Mod: S$GLB,,, | Performed by: INTERNAL MEDICINE

## 2022-04-22 PROCEDURE — 99999 PR PBB SHADOW E&M-EST. PATIENT-LVL IV: ICD-10-PCS | Mod: PBBFAC,,, | Performed by: INTERNAL MEDICINE

## 2022-04-22 PROCEDURE — 3288F FALL RISK ASSESSMENT DOCD: CPT | Mod: CPTII,S$GLB,, | Performed by: INTERNAL MEDICINE

## 2022-04-22 PROCEDURE — 1160F PR REVIEW ALL MEDS BY PRESCRIBER/CLIN PHARMACIST DOCUMENTED: ICD-10-PCS | Mod: CPTII,S$GLB,, | Performed by: INTERNAL MEDICINE

## 2022-04-22 PROCEDURE — 99999 PR PBB SHADOW E&M-EST. PATIENT-LVL IV: CPT | Mod: PBBFAC,,, | Performed by: INTERNAL MEDICINE

## 2022-04-22 PROCEDURE — 1160F RVW MEDS BY RX/DR IN RCRD: CPT | Mod: CPTII,S$GLB,, | Performed by: INTERNAL MEDICINE

## 2022-04-22 PROCEDURE — 3079F PR MOST RECENT DIASTOLIC BLOOD PRESSURE 80-89 MM HG: ICD-10-PCS | Mod: CPTII,S$GLB,, | Performed by: INTERNAL MEDICINE

## 2022-04-22 PROCEDURE — 3079F DIAST BP 80-89 MM HG: CPT | Mod: CPTII,S$GLB,, | Performed by: INTERNAL MEDICINE

## 2022-04-22 PROCEDURE — 99204 OFFICE O/P NEW MOD 45 MIN: CPT | Mod: S$GLB,,, | Performed by: INTERNAL MEDICINE

## 2022-04-22 PROCEDURE — 1159F MED LIST DOCD IN RCRD: CPT | Mod: CPTII,S$GLB,, | Performed by: INTERNAL MEDICINE

## 2022-04-22 PROCEDURE — 1159F PR MEDICATION LIST DOCUMENTED IN MEDICAL RECORD: ICD-10-PCS | Mod: CPTII,S$GLB,, | Performed by: INTERNAL MEDICINE

## 2022-04-22 PROCEDURE — 1101F PT FALLS ASSESS-DOCD LE1/YR: CPT | Mod: CPTII,S$GLB,, | Performed by: INTERNAL MEDICINE

## 2022-04-22 PROCEDURE — 3077F SYST BP >= 140 MM HG: CPT | Mod: CPTII,S$GLB,, | Performed by: INTERNAL MEDICINE

## 2022-04-22 NOTE — PROGRESS NOTES
"Subjective:       Patient ID: Marianne Cali is a 76 y.o. Black or  female who presents for initial evaluation of CKD referred by PCP, dr Patel.     Pt states they are here to establish renal care.   Renal function per chart review with sr cr baseline of 1- 1.5 mg/dL, highly variable and with multiple AKIs.    - DM type 2 diagnosed in early 2000s. "No one mentioned her A!C before" Hospitalized for DKA. Unknown proteinuria,  currently on RASSi.   - HTN diagnosed about 40 years ago. Patient reports good adherence to the current regiment, which includes carvedilol 25 mg PO BID, Irbesartan-HCTZ 300 mg -12.5 mg. They are following low salt diet. Never hospitalized for uncontrolled HTN or hypotension/syncopal episodes.    Denies use of NSAIDs, nephrolithiasis or fam Hx of renal disease.      Review of Systems   Constitutional: Negative for chills, fatigue and fever.   HENT: Negative for hearing loss, trouble swallowing and voice change.    Respiratory: Positive for shortness of breath. Negative for cough and wheezing.    Cardiovascular: Negative for chest pain, palpitations and leg swelling.   Gastrointestinal: Negative for abdominal distention, abdominal pain, constipation and diarrhea.   Endocrine: Negative for polydipsia, polyphagia and polyuria.   Genitourinary: Negative for dysuria, flank pain, frequency and hematuria.   Musculoskeletal: Negative for arthralgias, back pain and myalgias.   Skin: Negative for rash and wound.   Neurological: Positive for dizziness, syncope and light-headedness. Negative for weakness.   Hematological: Negative for adenopathy. Does not bruise/bleed easily.       The past medical, family and social histories were reviewed for this encounter.     BP (!) 150/80 (BP Location: Left arm, Patient Position: Sitting, BP Method: Medium (Manual))   Pulse 72   Ht 5' 9" (1.753 m)   Wt 61.7 kg (136 lb)   SpO2 95%   BMI 20.08 kg/m²     Objective:      Physical Exam  Vitals and " nursing note reviewed.   Constitutional:       Appearance: Normal appearance. She is ill-appearing.   HENT:      Head: Normocephalic and atraumatic.      Mouth/Throat:      Mouth: Mucous membranes are moist.      Pharynx: Oropharynx is clear.   Eyes:      Extraocular Movements: Extraocular movements intact.      Conjunctiva/sclera: Conjunctivae normal.   Neck:      Vascular: No carotid bruit.   Cardiovascular:      Rate and Rhythm: Normal rate and regular rhythm.      Heart sounds: Normal heart sounds.   Pulmonary:      Effort: Pulmonary effort is normal. No respiratory distress.      Breath sounds: Normal breath sounds.   Abdominal:      General: Bowel sounds are normal. There is no distension.      Palpations: Abdomen is soft.      Tenderness: There is no abdominal tenderness.   Musculoskeletal:         General: No swelling or tenderness. Normal range of motion.      Cervical back: Normal range of motion. No tenderness.   Lymphadenopathy:      Cervical: No cervical adenopathy.   Skin:     General: Skin is warm and dry.      Capillary Refill: Capillary refill takes less than 2 seconds.      Coloration: Skin is not jaundiced.   Neurological:      General: No focal deficit present.      Mental Status: She is alert and oriented to person, place, and time.   Psychiatric:         Mood and Affect: Mood normal.         Behavior: Behavior normal.         Judgment: Judgment normal.         Assessment:       1. Stage 3a chronic kidney disease    2. Chronic diastolic congestive heart failure    3. Cardiac pacemaker in situ    4. Hypertension associated with diabetes    5. Chronic anemia    6. Type 2 diabetes mellitus with hyperglycemia, without long-term current use of insulin    7. Gout, unspecified cause, unspecified chronicity, unspecified site    8. Syncope, unspecified syncope type        Plan:   Return to clinic in 6 months    CKD in a setting of DM and HTN  Baseline creatinine is 1 - 1.5 mg/dL              - Last known  sr cr of 1.3 mg/dL corresponding to CKD stage 3a and UPC pending   - Hypovolemic   - Pt understands importance of blood pressure and glycemic control and is aware that uncontrolled HTN and DM leads to progression of CKD   - Pt will benefit from initiation of SGLT2i for cario-renal protection   - Complete avoidance of NSAIDs   - Low salt diet with < 2000 mg/day   - Dose adjust medications to GFR of 45   - Avoid nephrotoxins including IV contrast    HTN   - BP moderately controlled: continue current medications, avoid hypotension and dehydration    DM   - DM without diabetic retinopathy: uncontrolled with most recent HgbA1c of 10.8%, continue yearly optho checks    Frequent UTIs   - Follows with urology    Med changes: none

## 2022-05-06 ENCOUNTER — HOSPITAL ENCOUNTER (OUTPATIENT)
Dept: RADIOLOGY | Facility: HOSPITAL | Age: 77
Discharge: HOME | End: 2022-05-06
Attending: INTERNAL MEDICINE
Payer: COMMERCIAL

## 2022-05-06 DIAGNOSIS — N18.31 STAGE 3A CHRONIC KIDNEY DISEASE: ICD-10-CM

## 2022-05-06 PROCEDURE — 76770 US EXAM ABDO BACK WALL COMP: CPT | Mod: TC,PO

## 2022-05-06 PROCEDURE — 76770 US EXAM ABDO BACK WALL COMP: CPT | Mod: 26,,, | Performed by: RADIOLOGY

## 2022-05-06 PROCEDURE — 76770 US RETROPERITONEAL COMPLETE: ICD-10-PCS | Mod: 26,,, | Performed by: RADIOLOGY

## 2022-05-31 ENCOUNTER — PATIENT MESSAGE (OUTPATIENT)
Dept: FAMILY MEDICINE | Facility: CLINIC | Age: 77
End: 2022-05-31
Payer: MEDICARE

## 2022-07-19 ENCOUNTER — TELEPHONE (OUTPATIENT)
Dept: FAMILY MEDICINE | Facility: CLINIC | Age: 77
End: 2022-07-19

## 2022-07-19 ENCOUNTER — OFFICE VISIT (OUTPATIENT)
Dept: FAMILY MEDICINE | Facility: CLINIC | Age: 77
End: 2022-07-19
Payer: MEDICARE

## 2022-07-19 VITALS
DIASTOLIC BLOOD PRESSURE: 70 MMHG | WEIGHT: 136 LBS | BODY MASS INDEX: 20.14 KG/M2 | SYSTOLIC BLOOD PRESSURE: 126 MMHG | HEART RATE: 66 BPM | HEIGHT: 69 IN | TEMPERATURE: 97 F | OXYGEN SATURATION: 98 %

## 2022-07-19 DIAGNOSIS — R11.0 NAUSEA: ICD-10-CM

## 2022-07-19 DIAGNOSIS — Z79.899 ENCOUNTER FOR LONG-TERM (CURRENT) USE OF MEDICATIONS: ICD-10-CM

## 2022-07-19 DIAGNOSIS — K92.2 UPPER GI BLEED: ICD-10-CM

## 2022-07-19 DIAGNOSIS — Z09 HOSPITAL DISCHARGE FOLLOW-UP: Primary | ICD-10-CM

## 2022-07-19 DIAGNOSIS — N18.31 STAGE 3A CHRONIC KIDNEY DISEASE: ICD-10-CM

## 2022-07-19 DIAGNOSIS — E11.65 TYPE 2 DIABETES MELLITUS WITH HYPERGLYCEMIA, WITHOUT LONG-TERM CURRENT USE OF INSULIN: Chronic | ICD-10-CM

## 2022-07-19 DIAGNOSIS — D64.9 CHRONIC ANEMIA: ICD-10-CM

## 2022-07-19 DIAGNOSIS — E11.59 HYPERTENSION ASSOCIATED WITH DIABETES: ICD-10-CM

## 2022-07-19 DIAGNOSIS — E11.65 TYPE 2 DIABETES MELLITUS WITH HYPERGLYCEMIA, WITHOUT LONG-TERM CURRENT USE OF INSULIN: ICD-10-CM

## 2022-07-19 DIAGNOSIS — I15.2 HYPERTENSION ASSOCIATED WITH DIABETES: ICD-10-CM

## 2022-07-19 PROBLEM — D69.6 THROMBOCYTOPENIA: Status: ACTIVE | Noted: 2022-07-12

## 2022-07-19 PROBLEM — R19.7 DIARRHEA: Status: ACTIVE | Noted: 2022-07-12

## 2022-07-19 PROBLEM — I25.2 HISTORY OF NON-ST ELEVATION MYOCARDIAL INFARCTION (NSTEMI): Status: ACTIVE | Noted: 2020-06-08

## 2022-07-19 PROBLEM — R07.89 CHEST WALL PAIN: Status: ACTIVE | Noted: 2022-07-03

## 2022-07-19 PROBLEM — F01.50 VASCULAR DEMENTIA: Status: ACTIVE | Noted: 2022-07-03

## 2022-07-19 PROCEDURE — 3074F SYST BP LT 130 MM HG: CPT | Mod: CPTII,S$GLB,, | Performed by: FAMILY MEDICINE

## 2022-07-19 PROCEDURE — S0119 PR ONDANSETRON, ORAL, 4MG: ICD-10-PCS | Mod: S$GLB,,, | Performed by: FAMILY MEDICINE

## 2022-07-19 PROCEDURE — 1159F MED LIST DOCD IN RCRD: CPT | Mod: CPTII,S$GLB,, | Performed by: FAMILY MEDICINE

## 2022-07-19 PROCEDURE — 3078F PR MOST RECENT DIASTOLIC BLOOD PRESSURE < 80 MM HG: ICD-10-PCS | Mod: CPTII,S$GLB,, | Performed by: FAMILY MEDICINE

## 2022-07-19 PROCEDURE — 99215 PR OFFICE/OUTPT VISIT, EST, LEVL V, 40-54 MIN: ICD-10-PCS | Mod: S$GLB,,, | Performed by: FAMILY MEDICINE

## 2022-07-19 PROCEDURE — 99999 PR PBB SHADOW E&M-EST. PATIENT-LVL V: ICD-10-PCS | Mod: PBBFAC,,, | Performed by: FAMILY MEDICINE

## 2022-07-19 PROCEDURE — 99999 PR PBB SHADOW E&M-EST. PATIENT-LVL V: CPT | Mod: PBBFAC,,, | Performed by: FAMILY MEDICINE

## 2022-07-19 PROCEDURE — 99215 OFFICE O/P EST HI 40 MIN: CPT | Mod: S$GLB,,, | Performed by: FAMILY MEDICINE

## 2022-07-19 PROCEDURE — S0119 ONDANSETRON 4 MG: HCPCS | Mod: S$GLB,,, | Performed by: FAMILY MEDICINE

## 2022-07-19 PROCEDURE — 1159F PR MEDICATION LIST DOCUMENTED IN MEDICAL RECORD: ICD-10-PCS | Mod: CPTII,S$GLB,, | Performed by: FAMILY MEDICINE

## 2022-07-19 PROCEDURE — 3288F FALL RISK ASSESSMENT DOCD: CPT | Mod: CPTII,S$GLB,, | Performed by: FAMILY MEDICINE

## 2022-07-19 PROCEDURE — 3288F PR FALLS RISK ASSESSMENT DOCUMENTED: ICD-10-PCS | Mod: CPTII,S$GLB,, | Performed by: FAMILY MEDICINE

## 2022-07-19 PROCEDURE — 3074F PR MOST RECENT SYSTOLIC BLOOD PRESSURE < 130 MM HG: ICD-10-PCS | Mod: CPTII,S$GLB,, | Performed by: FAMILY MEDICINE

## 2022-07-19 PROCEDURE — 3078F DIAST BP <80 MM HG: CPT | Mod: CPTII,S$GLB,, | Performed by: FAMILY MEDICINE

## 2022-07-19 PROCEDURE — 1126F PR PAIN SEVERITY QUANTIFIED, NO PAIN PRESENT: ICD-10-PCS | Mod: CPTII,S$GLB,, | Performed by: FAMILY MEDICINE

## 2022-07-19 PROCEDURE — 1101F PR PT FALLS ASSESS DOC 0-1 FALLS W/OUT INJ PAST YR: ICD-10-PCS | Mod: CPTII,S$GLB,, | Performed by: FAMILY MEDICINE

## 2022-07-19 PROCEDURE — 1126F AMNT PAIN NOTED NONE PRSNT: CPT | Mod: CPTII,S$GLB,, | Performed by: FAMILY MEDICINE

## 2022-07-19 PROCEDURE — 1101F PT FALLS ASSESS-DOCD LE1/YR: CPT | Mod: CPTII,S$GLB,, | Performed by: FAMILY MEDICINE

## 2022-07-19 RX ORDER — ONDANSETRON 4 MG/1
4 TABLET, FILM COATED ORAL EVERY 8 HOURS PRN
Qty: 30 TABLET | Refills: 1 | Status: SHIPPED | OUTPATIENT
Start: 2022-07-19 | End: 2024-01-19

## 2022-07-19 RX ORDER — ONDANSETRON 4 MG/1
4 TABLET, FILM COATED ORAL
Status: COMPLETED | OUTPATIENT
Start: 2022-07-19 | End: 2022-07-19

## 2022-07-19 RX ORDER — SEMAGLUTIDE 1.34 MG/ML
0.5 INJECTION, SOLUTION SUBCUTANEOUS WEEKLY
Qty: 3 PEN | Refills: 4 | Status: SHIPPED | OUTPATIENT
Start: 2022-07-19 | End: 2023-08-22 | Stop reason: SDUPTHER

## 2022-07-19 RX ORDER — CARVEDILOL 3.12 MG/1
TABLET ORAL
COMMUNITY
Start: 2022-07-16

## 2022-07-19 RX ORDER — ISOSORBIDE MONONITRATE 30 MG/1
30 TABLET, EXTENDED RELEASE ORAL DAILY
COMMUNITY
Start: 2022-07-16 | End: 2023-01-06 | Stop reason: SDUPTHER

## 2022-07-19 RX ORDER — GABAPENTIN 100 MG/1
300 CAPSULE ORAL NIGHTLY
COMMUNITY
Start: 2022-07-16 | End: 2024-01-19

## 2022-07-19 RX ORDER — ASPIRIN 81 MG/1
81 TABLET ORAL ONCE
Qty: 30 TABLET | Refills: 3 | Status: SHIPPED | OUTPATIENT
Start: 2022-07-19 | End: 2023-11-20

## 2022-07-19 RX ORDER — ONDANSETRON 4 MG/1
4 TABLET, ORALLY DISINTEGRATING ORAL ONCE
Qty: 1 TABLET | Refills: 0 | Status: CANCELLED | OUTPATIENT
Start: 2022-07-19 | End: 2022-07-19

## 2022-07-19 RX ADMIN — ONDANSETRON 4 MG: 4 TABLET, FILM COATED ORAL at 11:07

## 2022-07-19 NOTE — TELEPHONE ENCOUNTER
I have signed for the following orders AND/OR meds.  Please call the patient and ask the patient to schedule the testing AND/OR inform about any medications that were sent.      Orders Placed This Encounter   Procedures    PTH, intact     Standing Status:   Future     Standing Expiration Date:   9/17/2023    CBC Without Differential     Standing Status:   Future     Standing Expiration Date:   9/17/2023    Renal Function Panel     Standing Status:   Future     Standing Expiration Date:   9/17/2023    URIC ACID     Standing Status:   Future     Standing Expiration Date:   9/17/2023    Urinalysis, Reflex to Urine Culture Urine, Clean Catch     Standing Status:   Future     Standing Expiration Date:   1/19/2024     Order Specific Question:   Preferred Collection Type     Answer:   Urine, Clean Catch     Order Specific Question:   Specimen Source     Answer:   Urine

## 2022-07-19 NOTE — PATIENT INSTRUCTIONS
Follow up in about 3 months (around 10/19/2022), or if symptoms worsen or fail to improve, for follow up.     Dear patient,   As a result of recent federal legislation (The Federal Cures Act), you may receive lab or pathology results from your visit in your [facility] account before your physician is able to contact you. Your physician or their representative will relay the results to you with their recommendations at their soonest availability.     If no improvement in symptoms or symptoms worsen, please be advised to call MD, follow-up at clinic and/or go to ER if becomes severe.    Antoni Patel M.D.        We Offer TELEHEALTH & Same Day Appointments!   Book your Telehealth appointment with me through my nurse or   Clinic appointments on [employer]!    [address] [place] [address]    Office: [phone]   FAX: [phone]    Check out my Facebook Page and Follow Me at: [email or web address]    Check out my website [email or web address] by clicking on: [email or web address]    To Schedule appointments online, go to [email or web address]: [email or web address]

## 2022-07-19 NOTE — ASSESSMENT & PLAN NOTE
Patient has testing set up with Cardiology.  Referral to Gastroenterology for repeat endoscopy.  Checking labs today.  Anemia precautions.  ER precautions.    Transitional Care Note    Family and/or Caretaker present at visit?  Yes.  Diagnostic tests reviewed/disposition: I have reviewed all completed as well as pending diagnostic tests at the time of discharge.  Disease/illness education:  Upper GI bleed, diabetes  Home health/community services discussion/referrals: Patient has home health established at Home.   Establishment or re-establishment of referral orders for community resources: No other necessary community resources.   Discussion with other health care providers: No discussion with other health care providers necessary.

## 2022-07-19 NOTE — PROGRESS NOTES
PLAN:      Problem List Items Addressed This Visit     Type 2 diabetes mellitus with hyperglycemia, without long-term current use of insulin (Chronic)     Plan to hold glipizide if blood sugar is less than 100. Increase Ozempic to 0.5 milligrams weekly.    We will plan to monitor hemoglobin A1c at designated intervals 3 to 6 months.  I recommend ongoing Education for diabetic diet and exercise protocol.  We will continue to monitor for side effects.    Please be advised of symptoms to monitor for and to notify me immediately if persistent or worsening.  Follow up with Ophthalmology/Optometry and Podiatry at least annually.             Relevant Medications    ondansetron (ZOFRAN) 4 MG tablet    semaglutide (OZEMPIC) 0.25 mg or 0.5 mg(2 mg/1.5 mL) pen injector    Other Relevant Orders    Hemoglobin A1C    Encounter for long-term (current) use of medications (Chronic)     Update labs today.Complete history and physical was completed today.  Complete and thorough medication reconciliation was performed.  Discussed risks and benefits of medications.  Advised patient on orders and health maintenance.  We discussed old records and old labs if available.  Will request any records not available through epic.  Continue current medications listed on your summary sheet.             Relevant Orders    Hemoglobin A1C    Hospital discharge follow-up - Primary     Patient has testing set up with Cardiology.  Referral to Gastroenterology for repeat endoscopy.  Checking labs today.  Anemia precautions.  ER precautions.    Transitional Care Note    Family and/or Caretaker present at visit?  Yes.  Diagnostic tests reviewed/disposition: I have reviewed all completed as well as pending diagnostic tests at the time of discharge.  Disease/illness education:  Upper GI bleed, diabetes  Home health/community services discussion/referrals: Patient has home health established at Home.   Establishment or re-establishment of referral orders for  community resources: No other necessary community resources.   Discussion with other health care providers: No discussion with other health care providers necessary.                    Relevant Medications    aspirin (ECOTRIN) 81 MG EC tablet    Other Relevant Orders    Ambulatory referral/consult to Gastroenterology    Nausea     Start Zofran to help with nausea.  If uncontrolled or vomiting occurs please stop the medication and notify me immediately.           Relevant Medications    ondansetron tablet 4 mg (Completed)    Upper GI bleed    Relevant Orders    Ambulatory referral/consult to Gastroenterology        Future Appointments     Date Provider Specialty Appt Notes    7/19/2022  [facility] - lobby    7/19/2022  [facility] - lobby    10/24/2022 Jose Toney, DO Nephrology [facility]         Medication Management for assessment above:   Medication List with Changes/Refills   New Medications    ONDANSETRON (ZOFRAN) 4 MG TABLET    Take 1 tablet (4 mg total) by mouth every 8 (eight) hours as needed for Nausea.    SEMAGLUTIDE (OZEMPIC) 0.25 MG OR 0.5 MG(2 MG/1.5 ML) PEN INJECTOR    Inject 0.5 mg into the skin once a week.   Current Medications    ACCU-CHEK AMELIA PLUS METER MISC    Use 1 device  three times a day    ACCU-CHEK AMELIA PLUS TEST STRP STRP    1 strip 3 (three) times daily.    ATORVASTATIN (LIPITOR) 80 MG TABLET    Take 80 mg by mouth once daily.    CARVEDILOL (COREG) 3.125 MG TABLET    TAKE 1 TABLET BY MOUTH EVERY MORNING and ONE IN THE EVENING WITH A MEAL    GABAPENTIN (NEURONTIN) 100 MG CAPSULE    Take 200 mg by mouth nightly.    GLIPIZIDE (GLUCOTROL) 10 MG TR24    Take 10 mg by mouth 2 (two) times daily.    IRBESARTAN-HYDROCHLOROTHIAZIDE (AVALIDE) 300-12.5 MG PER TABLET    irbesartan 300 mg-hydrochlorothiazide 12.5 mg tablet   TAKE 1 TABLET BY MOUTH EVERY DAY    ISOSORBIDE MONONITRATE (IMDUR) 30 MG 24 HR TABLET    Take 30 mg by mouth once daily.    LANCETS, SUPER THIN MISC    TEST BLOOD SUGAR THREE  TIMES DAILY AS DIRECTED    METFORMIN (GLUCOPHAGE) 1000 MG TABLET    Take 1,000 mg by mouth 2 (two) times daily.    TOVIAZ 4 MG TB24    Take 4 mg by mouth once daily.   Changed and/or Refilled Medications    Modified Medication Previous Medication    ASPIRIN (ECOTRIN) 81 MG EC TABLET aspirin (ECOTRIN) 81 MG EC tablet       Take 1 tablet (81 mg total) by mouth once. for 1 dose    Aspir-81 mg tablet,delayed release   Take 1 tablet every day by oral route.   Discontinued Medications    CARVEDILOL (COREG) 25 MG TABLET    carvedilol 25 mg tablet   TAKE 1 TABLET BY MOUTH TWICE DAILY    JANUVIA 100 MG TAB    TAKE 1 TABLET BY MOUTH EVERY DAY FOR DIABETES       Antoni Patel M.D.  ==========================================================================  Subjective:   Patient ID: Marianne Cali is a 76 y.o. female.  has a past medical history of Allergy, Cataract, Dementia, Depression, Diabetes mellitus, type 2, Heart murmur, Hypertension, and Stroke.   Chief Complaint: discuss meds and Nausea      Problem List Items Addressed This Visit     Type 2 diabetes mellitus with hyperglycemia, without long-term current use of insulin (Chronic)    Overview     July 2022:  Patient with recent hospitalization.  Patient reports that she is taking the Ozempic 0.25 milligrams.  She is having some nausea but otherwise has been better controlled.  Patient is due for A1c.  April 2022:  Patient with uncontrolled A1c from last labs performed [place].  Currently on metformin 1000 milligrams twice daily, Januvia 100 milligrams, glipizide 10 milligrams twice daily.  Noncompliance with diet.  Denies history of pancreatitis, thyroid cancer, MEN syndrome.    Diabetes Management Status    Statin: Taking  ACE/ARB: Taking    Screening or Prevention Patient's value Goal Complete/Controlled?   HgA1C Testing and Control   Lab Results   Component Value Date    HGBA1C 10.8 (H) 04/13/2022      Annually/Less than 8% No   Lipid profile : 06/23/2022  Annually No   LDL control Lab Results   Component Value Date    LDLCALC 118 (H) 06/23/2022    Annually/Less than 100 mg/dl  No   Nephropathy screening No results found for: LABMICR  Lab Results   Component Value Date    PROTEINUA Negative 05/06/2022     Lab Results   Component Value Date    UTPCR 0.15 05/06/2022      Annually Yes   Blood pressure BP Readings from Last 1 Encounters:   07/19/22 126/70    Less than 140/90 Yes   Dilated retinal exam Most Recent Eye Exam Date: Not Found Annually No   Foot exam   Most Recent Foot Exam Date: Not Found Annually No                Current Assessment & Plan     Plan to hold glipizide if blood sugar is less than 100. Increase Ozempic to 0.5 milligrams weekly.    We will plan to monitor hemoglobin A1c at designated intervals 3 to 6 months.  I recommend ongoing Education for diabetic diet and exercise protocol.  We will continue to monitor for side effects.    Please be advised of symptoms to monitor for and to notify me immediately if persistent or worsening.  Follow up with Ophthalmology/Optometry and Podiatry at least annually.             Encounter for long-term (current) use of medications (Chronic)    Overview     CHRONIC. Stable. Compliant with medications for managed conditions. See medication list. No SE reported.   Routine lab analysis is being monitored. Refills were addressed.  Lab Results   Component Value Date    WBC 6.26 05/06/2022    HGB 9.7 (L) 05/06/2022    HCT 32.4 (L) 05/06/2022    MCV 87 05/06/2022     05/06/2022         Chemistry        Component Value Date/Time     05/06/2022 1119    K 3.8 05/06/2022 1119     05/06/2022 1119    CO2 29 05/06/2022 1119    BUN 29 (H) 05/06/2022 1119    CREATININE 1.5 (H) 05/06/2022 1119     (H) 05/06/2022 1119        Component Value Date/Time    CALCIUM 9.5 05/06/2022 1119    ESTGFRAFRICA 39.0 (A) 05/06/2022 1119    EGFRNONAA 33.8 (A) 05/06/2022 1119          No results found for: TSH, W1RIPBX,  U1PSGKR, THYROIDAB, FREET4, T3FREE             Current Assessment & Plan     Update labs today.Complete history and physical was completed today.  Complete and thorough medication reconciliation was performed.  Discussed risks and benefits of medications.  Advised patient on orders and health maintenance.  We discussed old records and old labs if available.  Will request any records not available through epic.  Continue current medications listed on your summary sheet.             Hospital discharge follow-up - Primary    Overview     [facility] DISCHARGE SUMMARY    Patient ID:  Marianne Cali  [id number]  76 y.o.  1945    Admit Date:   7/12/2022 12:42 PM    Discharge Date:   Discharge Today: 7/15/2022    Admitting Physician:   No admitting provider for patient encounter.     Discharge Physician:   Elias Savage MD    Consultants/Treatment Team:  Consultants   Provider Service Role Specialty   Ludwig Ken MD Gastroenterology Consulting Physician Gastroenterology       Reason for Admission/Admission Diagnoses:   Present on Admission:   Syncope and collapse   Essential hypertension   Mixed hyperlipidemia   Chronic diastolic congestive heart failure (HCC)   Chronic kidney disease, stage III (moderate) (HCC)   Upper GI bleed   Diarrhea   Normocytic anemia   Thrombocytopenia (HCC)    Discharge Diagnoses:   Active Hospital Problems   Diagnosis Date Noted    Syncope and collapse 07/12/2022    Upper GI bleed 07/12/2022    Diarrhea 07/12/2022    Normocytic anemia 07/12/2022    Thrombocytopenia (HCC) 07/12/2022    Chronic kidney disease, stage III (moderate) (HCC) 09/28/2021   Chronic    Chronic diastolic congestive heart failure (HCC) 06/16/2020   Chronic    History of non-ST elevation myocardial infarction (NSTEMI) 06/08/2020   Chronic    Mixed hyperlipidemia 02/21/2020   Chronic    Essential hypertension 04/18/2018   Chronic    History of coronary angioplasty with insertion of stent 2012   Chronic      Resolved Hospital Problems     History of Present Illness:   76-year-old -American woman with history of coronary artery disease with NSTEMI and cardiac stent, hypertension, chronic diastolic CHF, hyperlipidemia, chronic kidney disease stage III, vascular dementia, gastroesophageal reflux disease, CVA, and insulin-dependent diabetes mellitus type 2 who presented to the emergency department after syncopal episode at home. Patient was noted to be hypotensive on EMS arrival with systolic blood pressure in the 80s which has subsequently improved and now with elevated blood pressure readings.  Episode was witnessed by her  who is at bedside. Patient was sitting in her chair when she suddenly lost consciousness for approximately 30 minutes, no associated jerking motions, no urine or bowel incontinence, no injury sustained, patient merely slumped in her chair and did not fall to the ground. Patient does report intermittent diarrhea and black tarry stools over the last week. She denies any nausea or vomiting. She denies bleeding from any other source. She was recently discharged on 7/5/2022 after presenting with chest pain. She was discharged with Augmentin for elevated lactic acid level with suspected pneumonitis.       Hospital Course and Treatment:   Admission Information   Date & Time  7/12/2022 Provider  Elias Savage MD Department  [facility] Observation Unit Dept. Phone  [phone]       Melena:  Symptomatic anemia:  -Hemoglobin 8.8.  -S/p EGD 7/13, found to have gastritis.  -S/p colonoscopy 7/14, unremarkable.  -Patient was on Protonix.  -Per GI, stable for discharge, follow-up with GI as outpatient for video capsule endoscopy.  Discussed with patient and  at the bedside. Okay to go home today.    Syncope: Resolved  -Multifactorial due to anemia, hypoglycemia.  -CT head negative for acute intracranial pathology.     Essential hypertension:  -Resumed home dose  antihypertensives including Coreg, Imdur, HCTZ, irbesartan.  -Hydralazine IV as needed.     CAD, without chest pain.  -Held aspirin.  -Monitor.     Chronic diastolic heart failure:  -Denies shortness of breath at this time.  -[facility] 7/6 mild nonobstructive CAD.     Thrombocytopenia, resolved.    I discussed with the patient disease process and treatment.     I have personally seen and examined the patient, Marianne Cali, in a face to face encounter on the date of discharge.     She is cleared for discharge with instructions to follow up as directed.   Total time in the care and discharge planning of this patient was greater than 30 minutes.    Significant Diagnostic Studies:  Recent Imaging and Procedure Results   Procedure Component Value Ref Range Date/Time   CT Head WO Contrast [[id number]] Collected: 07/12/2022 1439   Updated: 07/12/2022 1443   Narrative:   REASON FOR EXAM: Syncope, recurrent     TECHNICAL FACTORS: 5 mm contiguous axial CT images were obtained from the foramen magnum to the skull vertex.     COMPARISON: 07/03/2022    FINDINGS: There is ventriculomegaly in the setting of diffuse cerebral volume loss. There is low-attenuation in the periventricular and deep white matter structures bilaterally, nonspecific but likely secondary to chronic small vessel ischemic change.   There is bilateral basal ganglia mineralization. These findings are similar to prior. There is no evidence of acute intracranial hemorrhage or infarct. There is no evidence of mass, mass effect, or midline shift. The visualized orbits are normal in   appearance. Paranasal sinuses are clear. Osseous structures are unremarkable.     Impression:   No acute intracranial abnormality.    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    Other chronic changes as above.     Electronically signed by Josef Booth MD on 7/12/2022 2:39 PM        Surgical Procedures during this  "visit:    Procedure(s):  COLONOSCOPY  Date  7/13/2022  Primary Surgeon  Kelsi Mantilla MD  -------------------    Procedure(s):  COLONOSCOPY  Date  7/14/2022  Primary Surgeon  Kelsi Mantilla MD  -------------------    Patient's Condition On Discharge:   Stable    Physical Exam  General Appearance: Comfortable and in no acute distress.   Vitals BP (!) 194/92  Pulse 83  Temp 98.3 °F (36.8 °C) (Oral)  Resp 18  Ht 5' 8" (1.727 m)  Wt 134 lb (60.8 kg)  SpO2 99%  BMI 20.37 kg/m² No fever. (Reviewed).   HEENT: Normal HEENT exam.   Lungs: Normal effort and normal respiratory rate. She is not in respiratory distress.   Heart: Normal rate. Regular rhythm. S1 normal and S2 normal. No murmur.   Chest: No chest wall tenderness.   Abdomen: Abdomen is soft. There is no abdominal tenderness.   Extremities: Normal range of motion.   Pulses: Distal pulses are intact.   Neurological: Patient is alert.   Skin: Warm.        Discharge Disposition:   Order for Discharge (From admission, onward)       Start Ordered   07/15/22 1029 Discharge Disposition to: Home or Self Care Once   Expected Discharge Date: 07/15/22     Question: Discharge Disposition to Answer: Home or Self Care   07/15/22 1029   07/15/22 0000 Follow-up with PCP Schedule for: 1; Weeks   Question Answer Comment   Schedule for 1   when Weeks     07/15/22 1029   07/15/22 0000 Follow-up with: KELSI MANTILLA; Schedule for: 1; Weeks   Question Answer Comment   with KELSI MANTILLA   Schedule for 1   when Weeks     07/15/22 1029             Discharge Orders:  Follow-up Information   Kelsi Mantilla MD. Schedule an appointment as soon as possible for a visit in 2 week(s).   Specialty: Gastroenterology  Contact information:  [address] [facility] [address]  [phone]        VALERIE Webster MD. Schedule an appointment as soon as possible for a visit in 1 week(s).   Specialty: Internal Medicine  Contact information:  [address] [facility]  [place] " [place] [address]  [phone]                Future Appointments:    Immunizations Administered for This Admission   No immunizations given this admission.         Medication List     CONTINUE taking these medications   atorvastatin 80 MG Tab tablet  Commonly known as: LIPITOR  Take 80 mg by mouth daily.      carvediloL 3.125 MG Tab tablet  Quantity: 60 tablet  Commonly known as: COREG  Take 1 tablet (3.125 mg total) by mouth in the morning and 1 tablet (3.125 mg total) in the evening. Take with meals.      gabapentin 100 MG Cap capsule  Commonly known as: NEURONTIN  Take 100 mg by mouth 2 (two) times daily      glipiZIDE 10 MG Tr24 24 hr tablet  Commonly known as: GLUCOTROL XL  Take 1 tablet (10 mg total) by mouth daily      irbesartan-hydrochlorothiazide 300-12.5 mg Tab per tablet  Commonly known as: AVALIDE  Take 1 tablet by mouth daily      isosorbide mononitrate 30 MG Tb24 24 hr tablet  Quantity: 30 tablet  Commonly known as: IMDUR  Take 1 tablet (30 mg total) by mouth daily      SITagliptin 50 MG Tab tablet  Quantity: 30 tablet  Commonly known as: JANUVIA  Take 1 tablet (50 mg total) by mouth daily      Toviaz 4 mg Tb24  Generic drug: fesoterodine  Take 1 mg by mouth daily      True Metrix Glucose Meter Misc  Generic drug: blood-glucose meter      True Metrix Glucose Test Strip Strp test strip  Generic drug: blood sugar diagnostic  test THREE TIMES DAILY WITH meter AS DIRECTED BY PHYSICIAN.        STOP taking these medications   amoxicillin-clavulanate 875-125 mg Tab per tablet  Commonly known as: AUGMENTIN      aspirin EC 81 MG Tbec EC tablet  Commonly known as: ECOTRIN          Discharge Orders   Future Labs/Procedures Expected by Expires   Activity as tolerated As directed   Diet (Select type) Cardiac/Low Chol/OCTAVIANO As directed   Questions:   Diet Type: Cardiac/Low Chol/OCTAVIANO   Other Restriction(s):   Liquid Consistency:   Sodium Restriction:   Fluid restriction:   Follow-up with PCP Schedule for: 1; Weeks As directed    Questions:   Schedule for: 1   when: Weeks   Follow-up with: KELSI WALSH; Schedule for: 1; Weeks As directed   Questions:   with: KELSI WALSH   Schedule for: 1   when: Weeks       - Greater than 35 minutes spent on discharge planning.    - Elias Savage MD.      Electronically signed by Elias Savage MD at 07/15/2022 10:34 AM CDT              Current Assessment & Plan     Patient has testing set up with Cardiology.  Referral to Gastroenterology for repeat endoscopy.  Checking labs today.  Anemia precautions.  ER precautions.    Transitional Care Note    Family and/or Caretaker present at visit?  Yes.  Diagnostic tests reviewed/disposition: I have reviewed all completed as well as pending diagnostic tests at the time of discharge.  Disease/illness education:  Upper GI bleed, diabetes  Home health/community services discussion/referrals: Patient has home health established at Home.   Establishment or re-establishment of referral orders for community resources: No other necessary community resources.   Discussion with other health care providers: No discussion with other health care providers necessary.                    Nausea    Overview     Associated with Ozempic use.           Current Assessment & Plan     Start Zofran to help with nausea.  If uncontrolled or vomiting occurs please stop the medication and notify me immediately.           Upper GI bleed    Overview     See hospitalization.                  Review of patient's allergies indicates:   Allergen Reactions    Morphine Anxiety     Current Outpatient Medications   Medication Instructions    ACCU-CHEK AMELIA PLUS METER Misc Use 1 device  three times a day    ACCU-CHEK AMELIA PLUS TEST STRP Strp 1 strip, 3 times daily    aspirin (ECOTRIN) 81 mg, Oral, Once    atorvastatin (LIPITOR) 80 mg, Oral, Daily    carvediloL (COREG) 3.125 MG tablet TAKE 1 TABLET BY MOUTH EVERY MORNING and ONE IN THE EVENING WITH A MEAL    gabapentin  "(NEURONTIN) 200 mg, Oral, Nightly    glipiZIDE (GLUCOTROL) 10 mg, Oral, 2 times daily    irbesartan-hydrochlorothiazide (AVALIDE) 300-12.5 mg per tablet irbesartan 300 mg-hydrochlorothiazide 12.5 mg tablet   TAKE 1 TABLET BY MOUTH EVERY DAY    isosorbide mononitrate (IMDUR) 30 mg, Oral, Daily    LANCETS, SUPER THIN Misc TEST BLOOD SUGAR THREE TIMES DAILY AS DIRECTED    metFORMIN (GLUCOPHAGE) 1,000 mg, Oral, 2 times daily    ondansetron (ZOFRAN) 4 mg, Oral, Every 8 hours PRN    OZEMPIC 0.5 mg, Subcutaneous, Weekly    TOVIAZ 4 mg, Oral, Daily      I have reviewed the PMH, social history, FamilyHx, surgical history, allergies and medications documented / confirmed by the patient at the time of this visit.  Review of Systems   Constitutional: Positive for fatigue. Negative for chills, fever and unexpected weight change.   HENT: Negative for ear pain and sore throat.    Eyes: Negative for redness and visual disturbance.   Respiratory: Negative for cough and shortness of breath.    Cardiovascular: Negative for chest pain and palpitations.   Gastrointestinal: Positive for nausea. Negative for abdominal distention, abdominal pain, blood in stool, constipation, diarrhea and vomiting.   Genitourinary: Negative for difficulty urinating and hematuria.   Musculoskeletal: Positive for arthralgias and gait problem. Negative for myalgias.   Skin: Negative for rash and wound.   Neurological: Positive for weakness. Negative for headaches.   Psychiatric/Behavioral: Negative for sleep disturbance. The patient is not nervous/anxious.      Objective:   /70   Pulse 66   Temp 96.6 °F (35.9 °C) (Other (see comments))   Ht 5' 9" (1.753 m)   Wt 61.7 kg (136 lb)   SpO2 98%   BMI 20.08 kg/m²   Physical Exam  Vitals and nursing note reviewed.   Constitutional:       General: She is not in acute distress.     Appearance: She is well-developed. She is not ill-appearing, toxic-appearing or diaphoretic.   HENT:      Head: " Normocephalic and atraumatic.      Right Ear: Hearing and external ear normal.      Left Ear: Hearing and external ear normal.      Nose: Nose normal. No rhinorrhea.   Eyes:      General: Lids are normal.      Extraocular Movements: Extraocular movements intact.      Conjunctiva/sclera: Conjunctivae normal.      Pupils: Pupils are equal, round, and reactive to light.   Cardiovascular:      Rate and Rhythm: Normal rate.      Pulses: Normal pulses.   Pulmonary:      Effort: Pulmonary effort is normal. No respiratory distress.      Breath sounds: Normal breath sounds.   Abdominal:      General: Bowel sounds are normal.      Palpations: Abdomen is soft.   Musculoskeletal:         General: Normal range of motion.      Cervical back: Normal range of motion and neck supple.   Skin:     General: Skin is warm and dry.      Capillary Refill: Capillary refill takes less than 2 seconds.      Coloration: Skin is not pale.   Neurological:      General: No focal deficit present.      Mental Status: She is alert and oriented to person, place, and time. She is not disoriented.      Cranial Nerves: No cranial nerve deficit.      Motor: Weakness present.      Gait: Gait abnormal (In wheelchair today).   Psychiatric:         Attention and Perception: She is attentive.         Mood and Affect: Mood normal. Mood is not anxious or depressed.         Speech: Speech is not rapid and pressured or slurred.         Behavior: Behavior normal. Behavior is not agitated, aggressive or hyperactive. Behavior is cooperative.         Thought Content: Thought content normal. Thought content is not paranoid or delusional. Thought content does not include homicidal or suicidal ideation. Thought content does not include homicidal or suicidal plan.         Cognition and Memory: Memory is not impaired.         Judgment: Judgment normal.       BMI Readings from Last 10 Encounters:   07/19/22 20.08 kg/m²   04/22/22 20.08 kg/m²   04/19/22 20.08 kg/m²          Assessment:     1. Hospital discharge follow-up    2. Nausea    3. Type 2 diabetes mellitus with hyperglycemia, without long-term current use of insulin    4. Encounter for long-term (current) use of medications    5. Upper GI bleed      MDM:   High medical complexity.  Moderate to high risk.  Total time: 41 minutes.  This includes total time spent on the encounter, which includes face to face time and non-face to face time preparing to see the patient (eg, review of previous medical records, tests), Obtaining and/or reviewing separately obtained history, documenting clinical information in the electronic or other health record, independently interpreting results (not separately reported)/communicating results to the patient/family/caregiver, and/or care coordination (not separately reported).    I have Reviewed and summarized old records.  I have performed thorough medication reconciliation today and discussed risk and benefits of medications.  I have reviewed labs and discussed with patient.  All questions were answered.  I am requesting old records and will review them once they are available.  Cardiology, [facility]    I have signed for the following orders AND/OR meds.  Orders Placed This Encounter   Procedures    Hemoglobin A1C     Standing Status:   Future     Number of Occurrences:   1     Standing Expiration Date:   9/17/2023    Ambulatory referral/consult to Gastroenterology     Standing Status:   Future     Standing Expiration Date:   8/19/2023     Referral Priority:   Urgent     Referral Type:   Consultation     Referral Reason:   Specialty Services Required     Referred to Provider:   Lazarus Mantilla Jr., MD     Requested Specialty:   Gastroenterology     Number of Visits Requested:   1     Medications Ordered This Encounter   Medications    aspirin (ECOTRIN) 81 MG EC tablet     Sig: Take 1 tablet (81 mg total) by mouth once. for 1 dose     Dispense:  30 tablet     Refill:  3     ondansetron (ZOFRAN) 4 MG tablet     Sig: Take 1 tablet (4 mg total) by mouth every 8 (eight) hours as needed for Nausea.     Dispense:  30 tablet     Refill:  1    ondansetron tablet 4 mg    semaglutide (OZEMPIC) 0.25 mg or 0.5 mg(2 mg/1.5 mL) pen injector     Sig: Inject 0.5 mg into the skin once a week.     Dispense:  3 pen     Refill:  4        Follow up in about 3 months (around 10/19/2022), or if symptoms worsen or fail to improve, for follow up.  Future Appointments     Date Provider Specialty Appt Notes    7/19/2022  Lab [facility]    7/19/2022  [facility]    10/24/2022 Jose Toney, DO Nephrology [facility]        If no improvement in symptoms or symptoms worsen, advised to call/follow-up at clinic or go to ER. Patient voiced understanding and all questions/concerns were addressed.   DISCLAIMER: This note was compiled by using a speech recognition dictation system and therefore please be aware that typographical / speech recognition errors can and do occur.  Please contact me if you see any errors specifically.    Antoni Patel M.D.       Office: [phone] [address] [place] [address]  FAX: [phone]

## 2022-07-19 NOTE — ASSESSMENT & PLAN NOTE
Plan to hold glipizide if blood sugar is less than 100. Increase Ozempic to 0.5 milligrams weekly.    We will plan to monitor hemoglobin A1c at designated intervals 3 to 6 months.  I recommend ongoing Education for diabetic diet and exercise protocol.  We will continue to monitor for side effects.    Please be advised of symptoms to monitor for and to notify me immediately if persistent or worsening.  Follow up with Ophthalmology/Optometry and Podiatry at least annually.

## 2022-07-19 NOTE — ASSESSMENT & PLAN NOTE
Start Zofran to help with nausea.  If uncontrolled or vomiting occurs please stop the medication and notify me immediately.

## 2022-07-20 ENCOUNTER — TELEPHONE (OUTPATIENT)
Dept: FAMILY MEDICINE | Facility: CLINIC | Age: 77
End: 2022-07-20
Payer: MEDICARE

## 2022-07-20 DIAGNOSIS — I63.9 CEREBROVASCULAR ACCIDENT (CVA), UNSPECIFIED MECHANISM: Primary | ICD-10-CM

## 2022-07-20 NOTE — TELEPHONE ENCOUNTER
----- Message from Dorothea Klein sent at 7/20/2022  1:53 PM CDT -----  Contact: Kirstievclle/ Daughter  Patients daughter  is calling to speak to the nurse regarding order. Reports wanting orders for outside PT at [facility]. Also regarding if the patient should be taking  januivia  medication. Please give patients daughter a call back at .[phone]

## 2022-07-20 NOTE — TELEPHONE ENCOUNTER
Spoke with patient's daughter and they want in patient rehab, I explained that  does not have admitting privileges at [facility].  I also explained that the patient should not be taking the Januvia while taking the Ozempic.  Patient is scheduled to come into the office on Friday for labs and urine tests.

## 2022-07-22 ENCOUNTER — LAB VISIT (OUTPATIENT)
Dept: LAB | Facility: HOSPITAL | Age: 77
End: 2022-07-22
Attending: FAMILY MEDICINE
Payer: MEDICARE

## 2022-07-22 DIAGNOSIS — R11.0 NAUSEA: ICD-10-CM

## 2022-07-22 DIAGNOSIS — E78.5 HYPERLIPIDEMIA ASSOCIATED WITH TYPE 2 DIABETES MELLITUS: ICD-10-CM

## 2022-07-22 DIAGNOSIS — D64.9 CHRONIC ANEMIA: ICD-10-CM

## 2022-07-22 DIAGNOSIS — Z09 HOSPITAL DISCHARGE FOLLOW-UP: ICD-10-CM

## 2022-07-22 DIAGNOSIS — Z00.00 ENCOUNTER FOR MEDICAL EXAMINATION TO ESTABLISH CARE: ICD-10-CM

## 2022-07-22 DIAGNOSIS — E11.59 HYPERTENSION ASSOCIATED WITH DIABETES: ICD-10-CM

## 2022-07-22 DIAGNOSIS — N18.31 STAGE 3A CHRONIC KIDNEY DISEASE: ICD-10-CM

## 2022-07-22 DIAGNOSIS — Z79.899 ENCOUNTER FOR LONG-TERM (CURRENT) USE OF MEDICATIONS: ICD-10-CM

## 2022-07-22 DIAGNOSIS — K92.2 UPPER GI BLEED: ICD-10-CM

## 2022-07-22 DIAGNOSIS — I15.2 HYPERTENSION ASSOCIATED WITH DIABETES: ICD-10-CM

## 2022-07-22 DIAGNOSIS — E11.69 HYPERLIPIDEMIA ASSOCIATED WITH TYPE 2 DIABETES MELLITUS: ICD-10-CM

## 2022-07-22 DIAGNOSIS — E11.65 TYPE 2 DIABETES MELLITUS WITH HYPERGLYCEMIA, WITHOUT LONG-TERM CURRENT USE OF INSULIN: ICD-10-CM

## 2022-07-22 LAB
ALBUMIN SERPL BCP-MCNC: 3.4 G/DL (ref 3.5–5.2)
ALBUMIN SERPL BCP-MCNC: 3.4 G/DL (ref 3.5–5.2)
ALP SERPL-CCNC: 86 U/L (ref 55–135)
ALT SERPL W/O P-5'-P-CCNC: 9 U/L (ref 10–44)
ANION GAP SERPL CALC-SCNC: 9 MMOL/L (ref 8–16)
ANION GAP SERPL CALC-SCNC: 9 MMOL/L (ref 8–16)
AST SERPL-CCNC: 18 U/L (ref 10–40)
BILIRUB SERPL-MCNC: 0.4 MG/DL (ref 0.1–1)
BUN SERPL-MCNC: 39 MG/DL (ref 8–23)
BUN SERPL-MCNC: 39 MG/DL (ref 8–23)
CALCIUM SERPL-MCNC: 9.8 MG/DL (ref 8.7–10.5)
CALCIUM SERPL-MCNC: 9.8 MG/DL (ref 8.7–10.5)
CHLORIDE SERPL-SCNC: 104 MMOL/L (ref 95–110)
CHLORIDE SERPL-SCNC: 104 MMOL/L (ref 95–110)
CHOLEST SERPL-MCNC: 136 MG/DL (ref 120–199)
CHOLEST/HDLC SERPL: 6.2 {RATIO} (ref 2–5)
CO2 SERPL-SCNC: 26 MMOL/L (ref 23–29)
CO2 SERPL-SCNC: 26 MMOL/L (ref 23–29)
CREAT SERPL-MCNC: 1.5 MG/DL (ref 0.5–1.4)
CREAT SERPL-MCNC: 1.5 MG/DL (ref 0.5–1.4)
ERYTHROCYTE [DISTWIDTH] IN BLOOD BY AUTOMATED COUNT: 14.6 % (ref 11.5–14.5)
EST. GFR  (AFRICAN AMERICAN): 39 ML/MIN/1.73 M^2
EST. GFR  (AFRICAN AMERICAN): 39 ML/MIN/1.73 M^2
EST. GFR  (NON AFRICAN AMERICAN): 33.8 ML/MIN/1.73 M^2
EST. GFR  (NON AFRICAN AMERICAN): 33.8 ML/MIN/1.73 M^2
GLUCOSE SERPL-MCNC: 119 MG/DL (ref 70–110)
GLUCOSE SERPL-MCNC: 119 MG/DL (ref 70–110)
HCT VFR BLD AUTO: 31.6 % (ref 37–48.5)
HDLC SERPL-MCNC: 22 MG/DL (ref 40–75)
HDLC SERPL: 16.2 % (ref 20–50)
HGB BLD-MCNC: 9.8 G/DL (ref 12–16)
LDLC SERPL CALC-MCNC: 90.2 MG/DL (ref 63–159)
MCH RBC QN AUTO: 26.7 PG (ref 27–31)
MCHC RBC AUTO-ENTMCNC: 31 G/DL (ref 32–36)
MCV RBC AUTO: 86 FL (ref 82–98)
NONHDLC SERPL-MCNC: 114 MG/DL
PHOSPHATE SERPL-MCNC: 3.4 MG/DL (ref 2.7–4.5)
PLATELET # BLD AUTO: 253 K/UL (ref 150–450)
PMV BLD AUTO: 10.7 FL (ref 9.2–12.9)
POTASSIUM SERPL-SCNC: 3.8 MMOL/L (ref 3.5–5.1)
POTASSIUM SERPL-SCNC: 3.8 MMOL/L (ref 3.5–5.1)
PROT SERPL-MCNC: 8.3 G/DL (ref 6–8.4)
PTH-INTACT SERPL-MCNC: 84.7 PG/ML (ref 9–77)
RBC # BLD AUTO: 3.67 M/UL (ref 4–5.4)
SODIUM SERPL-SCNC: 139 MMOL/L (ref 136–145)
SODIUM SERPL-SCNC: 139 MMOL/L (ref 136–145)
TRIGL SERPL-MCNC: 119 MG/DL (ref 30–150)
URATE SERPL-MCNC: 8.4 MG/DL (ref 2.4–5.7)
WBC # BLD AUTO: 4.68 K/UL (ref 3.9–12.7)

## 2022-07-22 PROCEDURE — 84100 ASSAY OF PHOSPHORUS: CPT | Performed by: FAMILY MEDICINE

## 2022-07-22 PROCEDURE — 84550 ASSAY OF BLOOD/URIC ACID: CPT | Performed by: FAMILY MEDICINE

## 2022-07-22 PROCEDURE — 36415 COLL VENOUS BLD VENIPUNCTURE: CPT | Mod: PO | Performed by: FAMILY MEDICINE

## 2022-07-22 PROCEDURE — 80053 COMPREHEN METABOLIC PANEL: CPT | Performed by: FAMILY MEDICINE

## 2022-07-22 PROCEDURE — 83970 ASSAY OF PARATHORMONE: CPT | Performed by: FAMILY MEDICINE

## 2022-07-22 PROCEDURE — 85027 COMPLETE CBC AUTOMATED: CPT | Performed by: FAMILY MEDICINE

## 2022-07-22 PROCEDURE — 80061 LIPID PANEL: CPT | Performed by: FAMILY MEDICINE

## 2022-07-25 PROBLEM — Z00.00 ENCOUNTER FOR MEDICAL EXAMINATION TO ESTABLISH CARE: Status: RESOLVED | Noted: 2022-04-19 | Resolved: 2022-07-25

## 2022-07-29 ENCOUNTER — TELEPHONE (OUTPATIENT)
Dept: FAMILY MEDICINE | Facility: CLINIC | Age: 77
End: 2022-07-29
Payer: MEDICARE

## 2022-07-29 NOTE — TELEPHONE ENCOUNTER
Please give them my verbal order to apply wound care and pressure sore precautions eval and treat as needed.  Make sure that the patient is being rotated and offloading the area with a wedge.  Follow-up in office for evaluation.

## 2022-07-29 NOTE — TELEPHONE ENCOUNTER
Spoke with Tiarra monique [facility] to give verbal orders for wound care, I also spoke with the patient's daughter to let her know about making sure to rotate the patient to keep the weight off the area where the pressure ulcer is beginning.

## 2022-07-31 NOTE — PROGRESS NOTES
Make follow-up lab appointment per recommendation below.  Check to see if patient has seen the results through my chart.  If not then,  #CALL THE PATIENT# to discuss results/see if they have questions and document verification of contact. Make F/U appt if needed. [phone]    #My interpretation that was sent to them through [facility] [place]:  Marianne, I have reviewed your recent blood work.     Uric acid level is improved slightly but still elevated.  Follow-up sooner if having issues with gout.  Your complete blood count is abnormal showing chronic anemia.    Your metabolic panel which shows your glucose, kidney function, electrolytes, and liver function is stable.  PTH level is improving.  Your cholesterol is improved from previous.      =========================  Also please address any outstanding health maintenance that may be due: Hepatitis C Screening Never done  Pneumococcal Vaccines (Age 65+)(1 - PCV) Never done  Foot Exam Never done  Eye Exam Never done  TETANUS VACCINE Never done  DEXA Scan Never done  Colorectal Cancer Screening Never done  Shingles Vaccine(1 of 2) Never done  COVID-19 Vaccine(4 - Booster for Moderna series) due on 03/17/2022  Hemoglobin A1c due on 07/13/2022

## 2022-08-15 ENCOUNTER — DOCUMENT SCAN (OUTPATIENT)
Dept: HOME HEALTH SERVICES | Facility: HOSPITAL | Age: 77
End: 2022-08-15
Payer: MEDICARE

## 2022-08-24 DIAGNOSIS — Z78.0 MENOPAUSE: ICD-10-CM

## 2022-09-12 DIAGNOSIS — N18.31 STAGE 3A CHRONIC KIDNEY DISEASE: Primary | ICD-10-CM

## 2022-09-13 ENCOUNTER — LAB VISIT (OUTPATIENT)
Dept: LAB | Facility: HOSPITAL | Age: 77
End: 2022-09-13
Attending: FAMILY MEDICINE
Payer: MEDICARE

## 2022-09-13 ENCOUNTER — TELEPHONE (OUTPATIENT)
Dept: FAMILY MEDICINE | Facility: CLINIC | Age: 77
End: 2022-09-13
Payer: MEDICARE

## 2022-09-13 DIAGNOSIS — R44.3 HALLUCINATION: Primary | ICD-10-CM

## 2022-09-13 DIAGNOSIS — R44.3 HALLUCINATION: ICD-10-CM

## 2022-09-13 LAB
BACTERIA #/AREA URNS HPF: ABNORMAL /HPF
BILIRUB UR QL STRIP: NEGATIVE
CLARITY UR: ABNORMAL
COLOR UR: YELLOW
GLUCOSE UR QL STRIP: NEGATIVE
HGB UR QL STRIP: ABNORMAL
HYALINE CASTS #/AREA URNS LPF: 0 /LPF
KETONES UR QL STRIP: NEGATIVE
LEUKOCYTE ESTERASE UR QL STRIP: ABNORMAL
MICROSCOPIC COMMENT: ABNORMAL
NITRITE UR QL STRIP: NEGATIVE
PH UR STRIP: 6 [PH] (ref 5–8)
PROT UR QL STRIP: ABNORMAL
RBC #/AREA URNS HPF: 3 /HPF (ref 0–4)
SP GR UR STRIP: 1.02 (ref 1–1.03)
SQUAMOUS #/AREA URNS HPF: 10 /HPF
URN SPEC COLLECT METH UR: ABNORMAL
WBC #/AREA URNS HPF: >100 /HPF (ref 0–5)

## 2022-09-13 PROCEDURE — 87086 URINE CULTURE/COLONY COUNT: CPT | Performed by: FAMILY MEDICINE

## 2022-09-13 PROCEDURE — 87077 CULTURE AEROBIC IDENTIFY: CPT | Performed by: FAMILY MEDICINE

## 2022-09-13 PROCEDURE — 81000 URINALYSIS NONAUTO W/SCOPE: CPT | Mod: PO | Performed by: FAMILY MEDICINE

## 2022-09-13 PROCEDURE — 87186 SC STD MICRODIL/AGAR DIL: CPT | Performed by: FAMILY MEDICINE

## 2022-09-13 PROCEDURE — 87088 URINE BACTERIA CULTURE: CPT | Performed by: FAMILY MEDICINE

## 2022-09-13 NOTE — TELEPHONE ENCOUNTER
----- Message from Cata Mayers sent at 9/13/2022  2:15 PM CDT -----  Contact: Daniel/[phone]  [facility] is calling in reference to patient is experiencing seeing things that's not there and headaches off  and on may be cause my UTI.  Please call her back at [phone].

## 2022-09-13 NOTE — TELEPHONE ENCOUNTER
I have signed for the following orders AND/OR meds.  Please call the patient and ask the patient to schedule the testing AND/OR inform about any medications that were sent.      Orders Placed This Encounter   Procedures    Urinalysis, Reflex to Urine Culture Urine, Clean Catch     Standing Status:   Future     Standing Expiration Date:   3/13/2024     Order Specific Question:   Preferred Collection Type     Answer:   Urine, Clean Catch     Order Specific Question:   Specimen Source     Answer:   Urine

## 2022-09-14 DIAGNOSIS — N39.0 URINARY TRACT INFECTION WITHOUT HEMATURIA, SITE UNSPECIFIED: Primary | ICD-10-CM

## 2022-09-14 RX ORDER — CIPROFLOXACIN 500 MG/1
500 TABLET ORAL 2 TIMES DAILY
Qty: 14 TABLET | Refills: 0 | Status: SHIPPED | OUTPATIENT
Start: 2022-09-14 | End: 2022-09-18 | Stop reason: CLARIF

## 2022-09-14 NOTE — PROGRESS NOTES
Please call to make sure patient get the results.Marianne, Your urinalysis is abnormal.  Showing infection, I am sending an antibiotic called ciprofloxacin to the pharmacy to treat this.  I am also sending the urine off for culture which will give us more information on the type of bacteria causing this infection.  Please  the antibiotic in begin taking as soon as possible.  Increase hydration with water.  Follow-up with us sooner if still having symptoms.  ER precautions for severe symptoms.      [phone]

## 2022-09-16 DIAGNOSIS — B96.20 E. COLI UTI: Primary | ICD-10-CM

## 2022-09-16 DIAGNOSIS — N39.0 E. COLI UTI: Primary | ICD-10-CM

## 2022-09-16 LAB — BACTERIA UR CULT: ABNORMAL

## 2022-09-16 RX ORDER — AMOXICILLIN AND CLAVULANATE POTASSIUM 875; 125 MG/1; MG/1
1 TABLET, FILM COATED ORAL 2 TIMES DAILY
Qty: 20 TABLET | Refills: 0 | Status: SHIPPED | OUTPATIENT
Start: 2022-09-16 | End: 2022-09-18 | Stop reason: CLARIF

## 2022-09-16 NOTE — PROGRESS NOTES
+++++CALL patient with results and Document verification.  Schedule follow-up if needed.  [phone]  The urine culture has returned showing E coli urinary tract infection that is resistant to ciprofloxacin which she were prescribed.  I am changing the antibiotic to Augmentin which shows that it is sensitive for the type of E coli that you have currently.  Please  the new antibiotic and stop the current one.  I recommend that you follow-up with Urology since you are starting to have resistance on urine culture.  Follow-up sooner if no improvement in symptoms.  ER precautions.

## 2022-09-18 ENCOUNTER — DOCUMENT SCAN (OUTPATIENT)
Dept: HOME HEALTH SERVICES | Facility: HOSPITAL | Age: 77
End: 2022-09-18
Payer: MEDICARE

## 2022-09-18 PROBLEM — E87.6 HYPOKALEMIA: Status: ACTIVE | Noted: 2022-09-18

## 2022-09-18 PROBLEM — N30.00 ACUTE CYSTITIS WITHOUT HEMATURIA: Status: ACTIVE | Noted: 2022-09-18

## 2022-09-18 PROBLEM — A41.9 SEPSIS: Status: ACTIVE | Noted: 2022-09-18

## 2022-09-18 PROBLEM — G93.41 ENCEPHALOPATHY, METABOLIC: Status: ACTIVE | Noted: 2022-09-18

## 2022-09-23 ENCOUNTER — PATIENT OUTREACH (OUTPATIENT)
Dept: ADMINISTRATIVE | Facility: HOSPITAL | Age: 77
End: 2022-09-23
Payer: MEDICARE

## 2022-09-23 NOTE — PROGRESS NOTES
Called patient to confirm hospital follow up scheduled on 09/26/2022. Unable to confirm patient did not answer, LVM.

## 2022-09-26 ENCOUNTER — OFFICE VISIT (OUTPATIENT)
Dept: FAMILY MEDICINE | Facility: CLINIC | Age: 77
End: 2022-09-26
Payer: MEDICARE

## 2022-09-26 VITALS
BODY MASS INDEX: 19.85 KG/M2 | WEIGHT: 134 LBS | HEIGHT: 69 IN | HEART RATE: 70 BPM | SYSTOLIC BLOOD PRESSURE: 130 MMHG | OXYGEN SATURATION: 100 % | TEMPERATURE: 98 F | DIASTOLIC BLOOD PRESSURE: 70 MMHG

## 2022-09-26 DIAGNOSIS — Z09 HOSPITAL DISCHARGE FOLLOW-UP: Primary | ICD-10-CM

## 2022-09-26 DIAGNOSIS — R41.82 ALTERED MENTAL STATUS, UNSPECIFIED ALTERED MENTAL STATUS TYPE: ICD-10-CM

## 2022-09-26 DIAGNOSIS — N30.00 ACUTE CYSTITIS WITHOUT HEMATURIA: ICD-10-CM

## 2022-09-26 PROCEDURE — 1126F PR PAIN SEVERITY QUANTIFIED, NO PAIN PRESENT: ICD-10-PCS | Mod: CPTII,S$GLB,, | Performed by: NURSE PRACTITIONER

## 2022-09-26 PROCEDURE — 99999 PR PBB SHADOW E&M-EST. PATIENT-LVL V: ICD-10-PCS | Mod: PBBFAC,,, | Performed by: NURSE PRACTITIONER

## 2022-09-26 PROCEDURE — 3051F HG A1C>EQUAL 7.0%<8.0%: CPT | Mod: CPTII,S$GLB,, | Performed by: NURSE PRACTITIONER

## 2022-09-26 PROCEDURE — 3078F PR MOST RECENT DIASTOLIC BLOOD PRESSURE < 80 MM HG: ICD-10-PCS | Mod: CPTII,S$GLB,, | Performed by: NURSE PRACTITIONER

## 2022-09-26 PROCEDURE — 1159F MED LIST DOCD IN RCRD: CPT | Mod: CPTII,S$GLB,, | Performed by: NURSE PRACTITIONER

## 2022-09-26 PROCEDURE — 3288F PR FALLS RISK ASSESSMENT DOCUMENTED: ICD-10-PCS | Mod: CPTII,S$GLB,, | Performed by: NURSE PRACTITIONER

## 2022-09-26 PROCEDURE — 1160F PR REVIEW ALL MEDS BY PRESCRIBER/CLIN PHARMACIST DOCUMENTED: ICD-10-PCS | Mod: CPTII,S$GLB,, | Performed by: NURSE PRACTITIONER

## 2022-09-26 PROCEDURE — 1101F PR PT FALLS ASSESS DOC 0-1 FALLS W/OUT INJ PAST YR: ICD-10-PCS | Mod: CPTII,S$GLB,, | Performed by: NURSE PRACTITIONER

## 2022-09-26 PROCEDURE — 99496 TRANSJ CARE MGMT HIGH F2F 7D: CPT | Mod: S$GLB,,, | Performed by: NURSE PRACTITIONER

## 2022-09-26 PROCEDURE — 1160F RVW MEDS BY RX/DR IN RCRD: CPT | Mod: CPTII,S$GLB,, | Performed by: NURSE PRACTITIONER

## 2022-09-26 PROCEDURE — 99999 PR PBB SHADOW E&M-EST. PATIENT-LVL V: CPT | Mod: PBBFAC,,, | Performed by: NURSE PRACTITIONER

## 2022-09-26 PROCEDURE — 3051F PR MOST RECENT HEMOGLOBIN A1C LEVEL 7.0 - < 8.0%: ICD-10-PCS | Mod: CPTII,S$GLB,, | Performed by: NURSE PRACTITIONER

## 2022-09-26 PROCEDURE — 99496 TRANSITIONAL CARE MANAGE SERVICE 7 DAY DISCHARGE: ICD-10-PCS | Mod: S$GLB,,, | Performed by: NURSE PRACTITIONER

## 2022-09-26 PROCEDURE — 1126F AMNT PAIN NOTED NONE PRSNT: CPT | Mod: CPTII,S$GLB,, | Performed by: NURSE PRACTITIONER

## 2022-09-26 PROCEDURE — 1159F PR MEDICATION LIST DOCUMENTED IN MEDICAL RECORD: ICD-10-PCS | Mod: CPTII,S$GLB,, | Performed by: NURSE PRACTITIONER

## 2022-09-26 PROCEDURE — 3288F FALL RISK ASSESSMENT DOCD: CPT | Mod: CPTII,S$GLB,, | Performed by: NURSE PRACTITIONER

## 2022-09-26 PROCEDURE — 3075F SYST BP GE 130 - 139MM HG: CPT | Mod: CPTII,S$GLB,, | Performed by: NURSE PRACTITIONER

## 2022-09-26 PROCEDURE — 1101F PT FALLS ASSESS-DOCD LE1/YR: CPT | Mod: CPTII,S$GLB,, | Performed by: NURSE PRACTITIONER

## 2022-09-26 PROCEDURE — 3075F PR MOST RECENT SYSTOLIC BLOOD PRESS GE 130-139MM HG: ICD-10-PCS | Mod: CPTII,S$GLB,, | Performed by: NURSE PRACTITIONER

## 2022-09-26 PROCEDURE — 3078F DIAST BP <80 MM HG: CPT | Mod: CPTII,S$GLB,, | Performed by: NURSE PRACTITIONER

## 2022-09-26 NOTE — PROGRESS NOTES
Subjective:       Patient ID: Marianne Cali is a 76 y.o. female.    Chief Complaint: Hospital Follow Up    HPI    She comes in for hospital follow up. She was admitted to [facility] on 9/18/22.  She presented to the ER with complaints of mental status change.  She was diagnosed with UTI. Completed test: UA. Treatment included antibiotics. Discharged 9/20/22. She has completed her antibiotics, her family reports that she is not having any confusion. She denies any symptoms at this time.     Transitional Care Note    Family and/or Caretaker present at visit?  Yes.  Diagnostic tests reviewed/disposition: I have reviewed all completed as well as pending diagnostic tests at the time of discharge.  Disease/illness education: UTI  Home health/community services discussion/referrals: Patient does not have home health established from hospital visit.  They do not need home health.  If needed, we will set up home health for the patient.   Establishment or re-establishment of referral orders for community resources: No other necessary community resources.   Discussion with other health care providers: No discussion with other health care providers necessary.           Review of Systems   Constitutional:  Negative for fatigue, fever and unexpected weight change.   HENT:  Negative for ear pain and sore throat.    Eyes:  Negative for pain and visual disturbance.   Respiratory:  Negative for cough and shortness of breath.    Cardiovascular:  Negative for chest pain and palpitations.   Gastrointestinal:  Negative for abdominal pain, diarrhea and vomiting.   Musculoskeletal:  Negative for arthralgias and myalgias.   Skin:  Negative for color change and rash.   Neurological:  Negative for dizziness and headaches.   Psychiatric/Behavioral:  Negative for dysphoric mood and sleep disturbance. The patient is not nervous/anxious.      Vitals:    09/26/22 0944   BP: 130/70   Pulse: 70   Temp: 97.6 °F (36.4 °C)       Objective:     Current  Outpatient Medications   Medication Sig Dispense Refill    ACCU-CHEK AMELIA PLUS METER Misc Use 1 device  three times a day      ACCU-CHEK AMELIA PLUS TEST STRP Strp 1 strip 3 (three) times daily.      atorvastatin (LIPITOR) 80 MG tablet Take 80 mg by mouth once daily.      carvediloL (COREG) 3.125 MG tablet TAKE 1 TABLET BY MOUTH EVERY MORNING and ONE IN THE EVENING WITH A MEAL      donepezil HCl (DONEPEZIL ORAL) donepezil Take No date recorded No form recorded No frequency recorded No route recorded No set duration recorded No set duration amount recorded active No dosage strength recorded No dosage strength units of measure recorded      estradioL (ESTRACE) 0.01 % (0.1 mg/gram) vaginal cream Place 1 g vaginally 3 (three) times a week.      gabapentin (NEURONTIN) 100 MG capsule Take 200 mg by mouth nightly.      glipiZIDE (GLUCOTROL) 10 MG TR24 Take 10 mg by mouth 2 (two) times daily.      irbesartan-hydrochlorothiazide (AVALIDE) 300-12.5 mg per tablet irbesartan 300 mg-hydrochlorothiazide 12.5 mg tablet   TAKE 1 TABLET BY MOUTH EVERY DAY      isosorbide mononitrate (IMDUR) 30 MG 24 hr tablet Take 30 mg by mouth once daily.      JANUVIA 50 mg Tab Take 50 mg by mouth once daily.      LANCETS, SUPER THIN Misc TEST BLOOD SUGAR THREE TIMES DAILY AS DIRECTED      metFORMIN (GLUCOPHAGE) 1000 MG tablet Take 1,000 mg by mouth 2 (two) times daily.      ondansetron (ZOFRAN) 4 MG tablet Take 1 tablet (4 mg total) by mouth every 8 (eight) hours as needed for Nausea. 30 tablet 1    semaglutide (OZEMPIC) 0.25 mg or 0.5 mg(2 mg/1.5 mL) pen injector Inject 0.5 mg into the skin once a week. 3 pen 4    TOVIAZ 4 mg Tb24 Take 4 mg by mouth once daily.      aspirin (ECOTRIN) 81 MG EC tablet Take 1 tablet (81 mg total) by mouth once. for 1 dose 30 tablet 3     No current facility-administered medications for this visit.       Physical Exam  Vitals and nursing note reviewed.   Constitutional:       General: She is not in acute distress.      Appearance: She is well-developed.   HENT:      Head: Normocephalic and atraumatic.   Eyes:      Pupils: Pupils are equal, round, and reactive to light.   Cardiovascular:      Rate and Rhythm: Normal rate and regular rhythm.   Pulmonary:      Effort: Pulmonary effort is normal.      Breath sounds: Normal breath sounds.   Musculoskeletal:      Cervical back: Normal range of motion and neck supple.   Skin:     General: Skin is warm and dry.      Findings: No rash.   Neurological:      Mental Status: She is alert and oriented to person, place, and time.   Psychiatric:         Judgment: Judgment normal.       Assessment:       1. Hospital discharge follow-up    2. Acute cystitis without hematuria          Plan:   Hospital discharge follow-up  -     Urinalysis, Reflex to Urine Culture Urine, Clean Catch; Future    Acute cystitis without hematuria  -     Urinalysis, Reflex to Urine Culture Urine, Clean Catch; Future        No follow-ups on file.    There are no Patient Instructions on file for this visit.

## 2022-09-28 ENCOUNTER — LAB VISIT (OUTPATIENT)
Dept: LAB | Facility: HOSPITAL | Age: 77
End: 2022-09-28
Attending: INTERNAL MEDICINE
Payer: MEDICARE

## 2022-09-28 DIAGNOSIS — N18.31 STAGE 3A CHRONIC KIDNEY DISEASE: ICD-10-CM

## 2022-09-28 LAB
BACTERIA #/AREA URNS AUTO: ABNORMAL /HPF
BACTERIA #/AREA URNS AUTO: ABNORMAL /HPF
BILIRUB UR QL STRIP: NEGATIVE
CLARITY UR REFRACT.AUTO: ABNORMAL
COLOR UR AUTO: YELLOW
CREAT UR-MCNC: 194 MG/DL (ref 15–325)
GLUCOSE UR QL STRIP: NEGATIVE
HGB UR QL STRIP: NEGATIVE
HYALINE CASTS UR QL AUTO: 70 /LPF
HYALINE CASTS UR QL AUTO: 9 /LPF
KETONES UR QL STRIP: ABNORMAL
LEUKOCYTE ESTERASE UR QL STRIP: ABNORMAL
MICROSCOPIC COMMENT: ABNORMAL
MICROSCOPIC COMMENT: ABNORMAL
NITRITE UR QL STRIP: NEGATIVE
NON-SQ EPI CELLS #/AREA URNS AUTO: 1 /HPF
PH UR STRIP: 5 [PH] (ref 5–8)
PROT UR QL STRIP: ABNORMAL
PROT UR-MCNC: 37 MG/DL (ref 0–15)
PROT/CREAT UR: 0.19 MG/G{CREAT} (ref 0–0.2)
RBC #/AREA URNS AUTO: 19 /HPF (ref 0–4)
RBC #/AREA URNS AUTO: 29 /HPF (ref 0–4)
SP GR UR STRIP: 1.02 (ref 1–1.03)
SQUAMOUS #/AREA URNS AUTO: 12 /HPF
SQUAMOUS #/AREA URNS AUTO: 25 /HPF
URN SPEC COLLECT METH UR: ABNORMAL
WBC #/AREA URNS AUTO: 19 /HPF (ref 0–5)
WBC #/AREA URNS AUTO: 33 /HPF (ref 0–5)
YEAST UR QL AUTO: ABNORMAL
YEAST UR QL AUTO: ABNORMAL

## 2022-09-28 PROCEDURE — 81001 URINALYSIS AUTO W/SCOPE: CPT | Mod: 91 | Performed by: INTERNAL MEDICINE

## 2022-09-28 PROCEDURE — 82570 ASSAY OF URINE CREATININE: CPT | Performed by: INTERNAL MEDICINE

## 2022-10-05 RX ORDER — LANCETS
EACH MISCELLANEOUS
Qty: 100 EACH | Refills: 3 | Status: SHIPPED | OUTPATIENT
Start: 2022-10-05 | End: 2024-01-19

## 2022-10-05 RX ORDER — BLOOD SUGAR DIAGNOSTIC
1 STRIP MISCELLANEOUS 3 TIMES DAILY
Qty: 100 EACH | Refills: 3 | Status: SHIPPED | OUTPATIENT
Start: 2022-10-05

## 2022-10-05 NOTE — TELEPHONE ENCOUNTER
----- Message from Dorothea Klein sent at 10/5/2022  3:20 PM CDT -----  Contact: Chervelle/ Daughter  Patients daughter is calling to speak to the nurse regarding scripted. Reports needing a new glucometer. Please give patients daughter a call back at .[phone]

## 2022-10-05 NOTE — TELEPHONE ENCOUNTER
No new care gaps identified.  [facility] Embedded Care Gaps. Reference number: [id number]. 10/05/2022   3:27:16 PM CDT

## 2022-10-24 PROBLEM — Z09 HOSPITAL DISCHARGE FOLLOW-UP: Status: RESOLVED | Noted: 2022-07-19 | Resolved: 2022-10-24

## 2022-11-03 ENCOUNTER — OFFICE VISIT (OUTPATIENT)
Dept: NEPHROLOGY | Facility: CLINIC | Age: 77
End: 2022-11-03
Payer: MEDICARE

## 2022-11-03 VITALS
DIASTOLIC BLOOD PRESSURE: 80 MMHG | HEIGHT: 69 IN | BODY MASS INDEX: 19.85 KG/M2 | WEIGHT: 134 LBS | SYSTOLIC BLOOD PRESSURE: 135 MMHG

## 2022-11-03 DIAGNOSIS — I25.118 CORONARY ARTERY DISEASE INVOLVING NATIVE CORONARY ARTERY OF NATIVE HEART WITH OTHER FORM OF ANGINA PECTORIS: ICD-10-CM

## 2022-11-03 DIAGNOSIS — N25.81 SECONDARY HYPERPARATHYROIDISM OF RENAL ORIGIN: ICD-10-CM

## 2022-11-03 DIAGNOSIS — E11.22 TYPE 2 DIABETES MELLITUS WITH STAGE 3A CHRONIC KIDNEY DISEASE, WITHOUT LONG-TERM CURRENT USE OF INSULIN: ICD-10-CM

## 2022-11-03 DIAGNOSIS — D63.1 ANEMIA IN STAGE 3A CHRONIC KIDNEY DISEASE: ICD-10-CM

## 2022-11-03 DIAGNOSIS — I50.32 CHRONIC DIASTOLIC CONGESTIVE HEART FAILURE: ICD-10-CM

## 2022-11-03 DIAGNOSIS — N18.31 STAGE 3A CHRONIC KIDNEY DISEASE: Primary | ICD-10-CM

## 2022-11-03 DIAGNOSIS — E11.59 HYPERTENSION ASSOCIATED WITH DIABETES: Chronic | ICD-10-CM

## 2022-11-03 DIAGNOSIS — N18.31 ANEMIA IN STAGE 3A CHRONIC KIDNEY DISEASE: ICD-10-CM

## 2022-11-03 DIAGNOSIS — N18.31 TYPE 2 DIABETES MELLITUS WITH STAGE 3A CHRONIC KIDNEY DISEASE, WITHOUT LONG-TERM CURRENT USE OF INSULIN: ICD-10-CM

## 2022-11-03 DIAGNOSIS — I15.2 HYPERTENSION ASSOCIATED WITH DIABETES: Chronic | ICD-10-CM

## 2022-11-03 PROCEDURE — 3079F PR MOST RECENT DIASTOLIC BLOOD PRESSURE 80-89 MM HG: ICD-10-PCS | Mod: CPTII,S$GLB,, | Performed by: INTERNAL MEDICINE

## 2022-11-03 PROCEDURE — 1160F RVW MEDS BY RX/DR IN RCRD: CPT | Mod: CPTII,S$GLB,, | Performed by: INTERNAL MEDICINE

## 2022-11-03 PROCEDURE — 3288F FALL RISK ASSESSMENT DOCD: CPT | Mod: CPTII,S$GLB,, | Performed by: INTERNAL MEDICINE

## 2022-11-03 PROCEDURE — 3051F HG A1C>EQUAL 7.0%<8.0%: CPT | Mod: CPTII,S$GLB,, | Performed by: INTERNAL MEDICINE

## 2022-11-03 PROCEDURE — 3075F PR MOST RECENT SYSTOLIC BLOOD PRESS GE 130-139MM HG: ICD-10-PCS | Mod: CPTII,S$GLB,, | Performed by: INTERNAL MEDICINE

## 2022-11-03 PROCEDURE — 1159F MED LIST DOCD IN RCRD: CPT | Mod: CPTII,S$GLB,, | Performed by: INTERNAL MEDICINE

## 2022-11-03 PROCEDURE — 1100F PR PT FALLS ASSESS DOC 2+ FALLS/FALL W/INJURY/YR: ICD-10-PCS | Mod: CPTII,S$GLB,, | Performed by: INTERNAL MEDICINE

## 2022-11-03 PROCEDURE — 99999 PR PBB SHADOW E&M-EST. PATIENT-LVL III: CPT | Mod: PBBFAC,,, | Performed by: INTERNAL MEDICINE

## 2022-11-03 PROCEDURE — 3051F PR MOST RECENT HEMOGLOBIN A1C LEVEL 7.0 - < 8.0%: ICD-10-PCS | Mod: CPTII,S$GLB,, | Performed by: INTERNAL MEDICINE

## 2022-11-03 PROCEDURE — 99999 PR PBB SHADOW E&M-EST. PATIENT-LVL III: ICD-10-PCS | Mod: PBBFAC,,, | Performed by: INTERNAL MEDICINE

## 2022-11-03 PROCEDURE — 3079F DIAST BP 80-89 MM HG: CPT | Mod: CPTII,S$GLB,, | Performed by: INTERNAL MEDICINE

## 2022-11-03 PROCEDURE — 99214 OFFICE O/P EST MOD 30 MIN: CPT | Mod: S$GLB,,, | Performed by: INTERNAL MEDICINE

## 2022-11-03 PROCEDURE — 1159F PR MEDICATION LIST DOCUMENTED IN MEDICAL RECORD: ICD-10-PCS | Mod: CPTII,S$GLB,, | Performed by: INTERNAL MEDICINE

## 2022-11-03 PROCEDURE — 1160F PR REVIEW ALL MEDS BY PRESCRIBER/CLIN PHARMACIST DOCUMENTED: ICD-10-PCS | Mod: CPTII,S$GLB,, | Performed by: INTERNAL MEDICINE

## 2022-11-03 PROCEDURE — 1100F PTFALLS ASSESS-DOCD GE2>/YR: CPT | Mod: CPTII,S$GLB,, | Performed by: INTERNAL MEDICINE

## 2022-11-03 PROCEDURE — 3075F SYST BP GE 130 - 139MM HG: CPT | Mod: CPTII,S$GLB,, | Performed by: INTERNAL MEDICINE

## 2022-11-03 PROCEDURE — 99214 PR OFFICE/OUTPT VISIT, EST, LEVL IV, 30-39 MIN: ICD-10-PCS | Mod: S$GLB,,, | Performed by: INTERNAL MEDICINE

## 2022-11-03 PROCEDURE — 3288F PR FALLS RISK ASSESSMENT DOCUMENTED: ICD-10-PCS | Mod: CPTII,S$GLB,, | Performed by: INTERNAL MEDICINE

## 2022-11-03 RX ORDER — VIT C/E/ZN/COPPR/LUTEIN/ZEAXAN 250MG-90MG
1000 CAPSULE ORAL DAILY
Qty: 90 CAPSULE | Refills: 3 | Status: SHIPPED | OUTPATIENT
Start: 2022-11-03 | End: 2023-05-15 | Stop reason: SDUPTHER

## 2022-11-03 NOTE — PROGRESS NOTES
"Subjective:       Patient ID: Marianne Cali is a 76 y.o. Black or  female who presents for follow up on CKD.     Since last seen at nephrology clinic, Marianne Cali had a hip fracture.  No uremic symptoms, not volume overloaded.    Reports taking their medications on time, without missed doses. As to their chronic conditions:  - CKD: Denies use of NSAIDs.   2022: baseline sr cr of 1 - 1.5 mg/dL with no proteinuria, Renal US R 10.4 cm and L 10.9 cm  - Dmt2: pt reports good glycemic control.  - HTN: well controlled, follows low salt diet. Denies uncontrolled HTN, dizziness/lightheadedness or hypotension/syncopal episodes.    Review of Systems   Constitutional:  Negative for chills, fatigue and fever.   HENT:  Negative for hearing loss, trouble swallowing and voice change.    Respiratory:  Negative for cough, shortness of breath and wheezing.    Cardiovascular:  Negative for chest pain, palpitations and leg swelling.   Gastrointestinal:  Negative for abdominal distention, abdominal pain, constipation and diarrhea.   Endocrine: Negative for polydipsia, polyphagia and polyuria.   Genitourinary:  Negative for dysuria, flank pain, frequency and hematuria.   Musculoskeletal:  Positive for arthralgias, gait problem and joint swelling. Negative for back pain and myalgias.   Skin:  Negative for rash and wound.   Neurological:  Negative for dizziness, syncope, weakness and light-headedness.   Hematological:  Negative for adenopathy. Does not bruise/bleed easily.     The past medical, family and social histories were reviewed for this encounter.     /80   Ht 5' 9" (1.753 m)   Wt 60.8 kg (134 lb)   BMI 19.79 kg/m²     Objective:      Physical Exam  Vitals and nursing note reviewed.   Constitutional:       Appearance: Normal appearance. She is ill-appearing.   HENT:      Head: Normocephalic and atraumatic.      Mouth/Throat:      Mouth: Mucous membranes are moist.      Pharynx: Oropharynx is clear.   Eyes: "      Extraocular Movements: Extraocular movements intact.      Conjunctiva/sclera: Conjunctivae normal.   Neck:      Vascular: No carotid bruit.   Cardiovascular:      Rate and Rhythm: Normal rate and regular rhythm.      Heart sounds: Normal heart sounds.   Pulmonary:      Effort: Pulmonary effort is normal. No respiratory distress.      Breath sounds: Normal breath sounds.   Abdominal:      General: Bowel sounds are normal. There is no distension.      Palpations: Abdomen is soft.      Tenderness: There is no abdominal tenderness.   Musculoskeletal:         General: Deformity (right hip) present. No swelling or tenderness. Normal range of motion.      Cervical back: Normal range of motion. No tenderness.      Comments: In wheelchair   Lymphadenopathy:      Cervical: No cervical adenopathy.   Skin:     General: Skin is warm and dry.      Capillary Refill: Capillary refill takes less than 2 seconds.      Coloration: Skin is not jaundiced.   Neurological:      General: No focal deficit present.      Mental Status: She is alert.   Psychiatric:         Mood and Affect: Mood normal.         Behavior: Behavior normal.         Judgment: Judgment normal.       Assessment:       1. Stage 3a chronic kidney disease    2. Hypertension associated with diabetes    3. Chronic diastolic congestive heart failure    4. Anemia in stage 3a chronic kidney disease    5. Type 2 diabetes mellitus with stage 3a chronic kidney disease, without long-term current use of insulin    6. Coronary artery disease involving native coronary artery of native heart with other form of angina pectoris    7. Secondary hyperparathyroidism of renal origin        Plan:   Return to clinic in 6 months    CKD stage G3aA1 in a setting of DM and HTN  Baseline creatinine is 1.1 - 1.3 mg/dL and no proteinuria  Lab Results   Component Value Date    CREATININE 1.10 09/19/2022      - Hypovolemic   - Pt understands importance of blood pressure and glycemic control and  is aware that uncontrolled HTN and DM leads to progression of CKD   - Complete avoidance of NSAIDs   - Low salt diet with < 2000 mg/day   - Dose adjust medications to GFR of 45-60   - Avoid nephrotoxins including IV contrast    HTN   - BP well controlled: continue current medications, avoid hypotension and dehydration    DM   - DM without diabetic retinopathy: mod controlled with most recent HgbA1c of 7.5%, continue yearly optho checks    SHPT  Lab Results   Component Value Date    PTH 84.7 (H) 07/22/2022    CALCIUM 8.3 (L) 09/19/2022    PHOS 3.4 07/22/2022    - Start Vit D    CAD   - Ok to take ASA 81 mg     Anemia   - Check Fe    HF   - not on lasix   - Salt restriction    Med changes:   Start Vit D 3 1000 IU daily

## 2022-11-09 ENCOUNTER — OFFICE VISIT (OUTPATIENT)
Dept: UROLOGY | Facility: CLINIC | Age: 77
End: 2022-11-09
Payer: MEDICARE

## 2022-11-09 VITALS
WEIGHT: 134 LBS | DIASTOLIC BLOOD PRESSURE: 99 MMHG | HEART RATE: 80 BPM | TEMPERATURE: 98 F | BODY MASS INDEX: 19.85 KG/M2 | HEIGHT: 69 IN | SYSTOLIC BLOOD PRESSURE: 181 MMHG | RESPIRATION RATE: 18 BRPM

## 2022-11-09 DIAGNOSIS — N39.0 E. COLI UTI: ICD-10-CM

## 2022-11-09 DIAGNOSIS — B96.20 E. COLI UTI: ICD-10-CM

## 2022-11-09 PROBLEM — R11.0 NAUSEA: Status: RESOLVED | Noted: 2022-07-19 | Resolved: 2022-11-09

## 2022-11-09 PROBLEM — R07.89 CHEST WALL PAIN: Status: RESOLVED | Noted: 2022-07-03 | Resolved: 2022-11-09

## 2022-11-09 PROCEDURE — 3080F DIAST BP >= 90 MM HG: CPT | Mod: CPTII,S$GLB,, | Performed by: NURSE PRACTITIONER

## 2022-11-09 PROCEDURE — 99999 PR PBB SHADOW E&M-EST. PATIENT-LVL IV: CPT | Mod: PBBFAC,,, | Performed by: NURSE PRACTITIONER

## 2022-11-09 PROCEDURE — 99999 PR PBB SHADOW E&M-EST. PATIENT-LVL IV: ICD-10-PCS | Mod: PBBFAC,,, | Performed by: NURSE PRACTITIONER

## 2022-11-09 PROCEDURE — 3288F PR FALLS RISK ASSESSMENT DOCUMENTED: ICD-10-PCS | Mod: CPTII,S$GLB,, | Performed by: NURSE PRACTITIONER

## 2022-11-09 PROCEDURE — 1159F PR MEDICATION LIST DOCUMENTED IN MEDICAL RECORD: ICD-10-PCS | Mod: CPTII,S$GLB,, | Performed by: NURSE PRACTITIONER

## 2022-11-09 PROCEDURE — 3288F FALL RISK ASSESSMENT DOCD: CPT | Mod: CPTII,S$GLB,, | Performed by: NURSE PRACTITIONER

## 2022-11-09 PROCEDURE — 1100F PTFALLS ASSESS-DOCD GE2>/YR: CPT | Mod: CPTII,S$GLB,, | Performed by: NURSE PRACTITIONER

## 2022-11-09 PROCEDURE — 99214 OFFICE O/P EST MOD 30 MIN: CPT | Mod: S$GLB,,, | Performed by: NURSE PRACTITIONER

## 2022-11-09 PROCEDURE — 99214 PR OFFICE/OUTPT VISIT, EST, LEVL IV, 30-39 MIN: ICD-10-PCS | Mod: S$GLB,,, | Performed by: NURSE PRACTITIONER

## 2022-11-09 PROCEDURE — 3077F SYST BP >= 140 MM HG: CPT | Mod: CPTII,S$GLB,, | Performed by: NURSE PRACTITIONER

## 2022-11-09 PROCEDURE — 1100F PR PT FALLS ASSESS DOC 2+ FALLS/FALL W/INJURY/YR: ICD-10-PCS | Mod: CPTII,S$GLB,, | Performed by: NURSE PRACTITIONER

## 2022-11-09 PROCEDURE — 3077F PR MOST RECENT SYSTOLIC BLOOD PRESSURE >= 140 MM HG: ICD-10-PCS | Mod: CPTII,S$GLB,, | Performed by: NURSE PRACTITIONER

## 2022-11-09 PROCEDURE — 3080F PR MOST RECENT DIASTOLIC BLOOD PRESSURE >= 90 MM HG: ICD-10-PCS | Mod: CPTII,S$GLB,, | Performed by: NURSE PRACTITIONER

## 2022-11-09 PROCEDURE — 1159F MED LIST DOCD IN RCRD: CPT | Mod: CPTII,S$GLB,, | Performed by: NURSE PRACTITIONER

## 2022-11-09 RX ORDER — CEPHALEXIN 250 MG/1
250 CAPSULE ORAL NIGHTLY
Qty: 90 CAPSULE | Refills: 0 | Status: SHIPPED | OUTPATIENT
Start: 2022-11-09 | End: 2023-01-09

## 2022-11-09 NOTE — PROGRESS NOTES
Chief Complaint:   Recurrent E coli cystitis    HPI:   Patient is a 77-year-old female that is presenting with her family.  Family states that patient has had recurrent E coli cystitis.  Patient is in a wheelchair in wears a depends.  No history of gross hematuria or renal stones.  Recent renal ultrasound was negative for renal stones, masses or hydronephrosis.  Urine in clinic is negative.    Allergies:  Morphine    Medications:  has a current medication list which includes the following prescription(s): accu-chek candida plus meter, accu-chek candida plus test strp, aspirin, atorvastatin, carvedilol, cholecalciferol (vitamin d3), donepezil hcl, estradiol, gabapentin, glipizide, irbesartan-hydrochlorothiazide, isosorbide mononitrate, januvia, lancets, super thin, metformin, ondansetron, ozempic, toviaz, and cephalexin.    Review of Systems:  General: No fever, chills, fatigability, or weight loss.  Skin: No rashes, itching, or changes in color or texture of skin.  Chest: Denies LIANG, cyanosis, wheezing, cough, and sputum production.  Abdomen: Appetite fine. No weight loss. Denies diarrhea, abdominal pain, hematemesis, or blood in stool.  Musculoskeletal: No joint stiffness or swelling. Denies back pain.  : As above.  All other review of systems negative.    PMH:   has a past medical history of Allergy, Cataract, Dementia, Depression, Diabetes mellitus, type 2, Heart murmur, Hypertension, and Stroke.    PSH:   has a past surgical history that includes Hip replacement arthroplasty; Eye surgery; and Hysterectomy.    FamHx: family history includes Cancer in her mother and sister; Hypertension in her mother.    SocHx:  reports that she has never smoked. She has never used smokeless tobacco. She reports that she does not drink alcohol and does not use drugs.      Physical Exam:  Vitals:    11/09/22 0909   BP: (!) 181/99   Pulse: 80   Resp: 18   Temp: 97.7 °F (36.5 °C)     General:  Frail, older  female,  appears older than her age, A&Ox3, no apparent distress, no deformities  Neck: No masses, normal thyroid  Lungs: normal inspiration, no use of accessory muscles  Heart: normal pulse, no arrhythmias  Abdomen: Soft, NT, ND, no masses, no hernias, no hepatosplenomegaly  Lymphatic: Neck and groin nodes negative    Labs/Studies:   See HPI    Impression/Plan:   Recurrent E coli cystitis  Patient and family were educated on behavior modifications needed to decrease recurrent E coli cystitis.  Unfortunately, patient uses a wheelchair for ambulation and has to take baths, cannot  the shower.  Will begin Keflex 250 mg once nightly for 3 months, prophylactic antibiotics, will see her back for re-evaluation in 4 months.

## 2022-12-01 PROCEDURE — G0179 PR HOME HEALTH MD RECERTIFICATION: ICD-10-PCS | Mod: ,,, | Performed by: FAMILY MEDICINE

## 2022-12-01 PROCEDURE — G0179 MD RECERTIFICATION HHA PT: HCPCS | Mod: ,,, | Performed by: FAMILY MEDICINE

## 2022-12-26 PROBLEM — A41.9 SEPSIS: Status: RESOLVED | Noted: 2022-09-18 | Resolved: 2022-12-26

## 2022-12-26 PROBLEM — J96.01 ACUTE HYPOXEMIC RESPIRATORY FAILURE: Status: RESOLVED | Noted: 2020-06-10 | Resolved: 2022-12-26

## 2023-01-05 ENCOUNTER — DOCUMENT SCAN (OUTPATIENT)
Dept: HOME HEALTH SERVICES | Facility: HOSPITAL | Age: 78
End: 2023-01-05
Payer: MEDICARE

## 2023-01-06 RX ORDER — ISOSORBIDE MONONITRATE 30 MG/1
30 TABLET, EXTENDED RELEASE ORAL DAILY
Qty: 30 TABLET | Refills: 2 | Status: SHIPPED | OUTPATIENT
Start: 2023-01-06 | End: 2023-09-05

## 2023-01-06 NOTE — TELEPHONE ENCOUNTER
Care Due:                  Date            Visit Type   Department     Provider  --------------------------------------------------------------------------------                                EP -                              PRIMARY      [facility] FAMILY  Last Visit: 07-      CARE ([facility])   MEDICINE       Antoni Patel  Next Visit: None Scheduled  None         None Found                                                            Last  Test          Frequency    Reason                     Performed    Due Date  --------------------------------------------------------------------------------    HBA1C.......  6 months...  semaglutide..............  09- 03-    [facility] Embedded Care Gaps. Reference number: [id number]. 1/06/2023   3:46:29 PM CST

## 2023-01-06 NOTE — TELEPHONE ENCOUNTER
----- Message from Laxmi Nichols sent at 1/6/2023  3:17 PM CST -----  Contact: Shireen/daughter  .Type:  RX Refill Request    Who Called: Shireen/daughter  Refill or New Rx:refill  RX Name and Strength:isosorbide mononitrate (IMDUR) 30 MG 24 hr tablet  How is the patient currently taking it? (ex. 1XDay):as prescribed  Is this a 30 day or 90 day RX:30  Preferred Pharmacy with phone number:.  [facility]  [place] - [address] [address]  Phone: [phone] Fax: [phone]  Local or Mail Order:local  Ordering Provider:Dr. Patel  Would the patient rather a call back or a response via [facility]? call  Best Call Back Number:.[phone]   Additional Information: patient has been out of medication for 3 days   Thanks  RYLAN

## 2023-01-09 ENCOUNTER — DOCUMENT SCAN (OUTPATIENT)
Dept: HOME HEALTH SERVICES | Facility: HOSPITAL | Age: 78
End: 2023-01-09
Payer: MEDICARE

## 2023-01-09 ENCOUNTER — OFFICE VISIT (OUTPATIENT)
Dept: FAMILY MEDICINE | Facility: CLINIC | Age: 78
End: 2023-01-09
Payer: MEDICARE

## 2023-01-09 VITALS
HEART RATE: 83 BPM | BODY MASS INDEX: 17.96 KG/M2 | OXYGEN SATURATION: 97 % | DIASTOLIC BLOOD PRESSURE: 62 MMHG | SYSTOLIC BLOOD PRESSURE: 118 MMHG | HEIGHT: 69 IN | WEIGHT: 121.25 LBS

## 2023-01-09 DIAGNOSIS — R41.82 ALTERED MENTAL STATUS, UNSPECIFIED ALTERED MENTAL STATUS TYPE: ICD-10-CM

## 2023-01-09 DIAGNOSIS — Z86.73 HISTORY OF CVA (CEREBROVASCULAR ACCIDENT): ICD-10-CM

## 2023-01-09 DIAGNOSIS — R29.6 FREQUENT FALLS: ICD-10-CM

## 2023-01-09 DIAGNOSIS — N39.0 FREQUENT UTI: ICD-10-CM

## 2023-01-09 DIAGNOSIS — Z71.89 ADVANCED CARE PLANNING/COUNSELING DISCUSSION: ICD-10-CM

## 2023-01-09 DIAGNOSIS — F01.B3 MODERATE VASCULAR DEMENTIA WITH MOOD DISTURBANCE: ICD-10-CM

## 2023-01-09 DIAGNOSIS — Z09 HOSPITAL DISCHARGE FOLLOW-UP: Primary | ICD-10-CM

## 2023-01-09 DIAGNOSIS — R53.1 GENERALIZED WEAKNESS: ICD-10-CM

## 2023-01-09 DIAGNOSIS — N18.31 STAGE 3A CHRONIC KIDNEY DISEASE: Chronic | ICD-10-CM

## 2023-01-09 LAB
BILIRUB UR QL STRIP: NEGATIVE
CLARITY UR: CLEAR
COLOR UR: YELLOW
GLUCOSE UR QL STRIP: NEGATIVE
HGB UR QL STRIP: NEGATIVE
KETONES UR QL STRIP: NEGATIVE
LEUKOCYTE ESTERASE UR QL STRIP: NEGATIVE
NITRITE UR QL STRIP: NEGATIVE
PH UR STRIP: 6 [PH] (ref 5–8)
PROT UR QL STRIP: NEGATIVE
SP GR UR STRIP: 1.01 (ref 1–1.03)
URN SPEC COLLECT METH UR: NORMAL

## 2023-01-09 PROCEDURE — 1160F RVW MEDS BY RX/DR IN RCRD: CPT | Mod: CPTII,S$GLB,, | Performed by: NURSE PRACTITIONER

## 2023-01-09 PROCEDURE — 81003 URINALYSIS AUTO W/O SCOPE: CPT | Mod: PO | Performed by: NURSE PRACTITIONER

## 2023-01-09 PROCEDURE — 1101F PT FALLS ASSESS-DOCD LE1/YR: CPT | Mod: CPTII,S$GLB,, | Performed by: NURSE PRACTITIONER

## 2023-01-09 PROCEDURE — 3074F SYST BP LT 130 MM HG: CPT | Mod: CPTII,S$GLB,, | Performed by: NURSE PRACTITIONER

## 2023-01-09 PROCEDURE — 3288F FALL RISK ASSESSMENT DOCD: CPT | Mod: CPTII,S$GLB,, | Performed by: NURSE PRACTITIONER

## 2023-01-09 PROCEDURE — 1159F PR MEDICATION LIST DOCUMENTED IN MEDICAL RECORD: ICD-10-PCS | Mod: CPTII,S$GLB,, | Performed by: NURSE PRACTITIONER

## 2023-01-09 PROCEDURE — 1159F MED LIST DOCD IN RCRD: CPT | Mod: CPTII,S$GLB,, | Performed by: NURSE PRACTITIONER

## 2023-01-09 PROCEDURE — 1101F PR PT FALLS ASSESS DOC 0-1 FALLS W/OUT INJ PAST YR: ICD-10-PCS | Mod: CPTII,S$GLB,, | Performed by: NURSE PRACTITIONER

## 2023-01-09 PROCEDURE — 1160F PR REVIEW ALL MEDS BY PRESCRIBER/CLIN PHARMACIST DOCUMENTED: ICD-10-PCS | Mod: CPTII,S$GLB,, | Performed by: NURSE PRACTITIONER

## 2023-01-09 PROCEDURE — 99999 PR PBB SHADOW E&M-EST. PATIENT-LVL V: CPT | Mod: PBBFAC,,, | Performed by: NURSE PRACTITIONER

## 2023-01-09 PROCEDURE — 3288F PR FALLS RISK ASSESSMENT DOCUMENTED: ICD-10-PCS | Mod: CPTII,S$GLB,, | Performed by: NURSE PRACTITIONER

## 2023-01-09 PROCEDURE — 3074F PR MOST RECENT SYSTOLIC BLOOD PRESSURE < 130 MM HG: ICD-10-PCS | Mod: CPTII,S$GLB,, | Performed by: NURSE PRACTITIONER

## 2023-01-09 PROCEDURE — 99214 PR OFFICE/OUTPT VISIT, EST, LEVL IV, 30-39 MIN: ICD-10-PCS | Mod: S$GLB,,, | Performed by: NURSE PRACTITIONER

## 2023-01-09 PROCEDURE — 99999 PR PBB SHADOW E&M-EST. PATIENT-LVL V: ICD-10-PCS | Mod: PBBFAC,,, | Performed by: NURSE PRACTITIONER

## 2023-01-09 PROCEDURE — 1126F AMNT PAIN NOTED NONE PRSNT: CPT | Mod: CPTII,S$GLB,, | Performed by: NURSE PRACTITIONER

## 2023-01-09 PROCEDURE — 3078F DIAST BP <80 MM HG: CPT | Mod: CPTII,S$GLB,, | Performed by: NURSE PRACTITIONER

## 2023-01-09 PROCEDURE — 1126F PR PAIN SEVERITY QUANTIFIED, NO PAIN PRESENT: ICD-10-PCS | Mod: CPTII,S$GLB,, | Performed by: NURSE PRACTITIONER

## 2023-01-09 PROCEDURE — 99214 OFFICE O/P EST MOD 30 MIN: CPT | Mod: S$GLB,,, | Performed by: NURSE PRACTITIONER

## 2023-01-09 PROCEDURE — 3078F PR MOST RECENT DIASTOLIC BLOOD PRESSURE < 80 MM HG: ICD-10-PCS | Mod: CPTII,S$GLB,, | Performed by: NURSE PRACTITIONER

## 2023-01-09 NOTE — PROGRESS NOTES
Assessment/Plan:  Problem List Items Addressed This Visit          Neuro    History of CVA (cerebrovascular accident)    Current Assessment & Plan     12/25/22- CT head without contrast with age-appropriate changes with chronic periventricular white matter microvascular disease. No evidence of acute or subacute CVA. No acute issues. On ASA and Statin. Following with neurology. Recommend follow up. Physical therapy, occupational therapy, and nursing treatment through home health.          Vascular dementia    Current Assessment & Plan     Chronic. Progressive decline. Taking Donepezil. Following with neurology. Recommend close follow up.         Relevant Orders    Ambulatory referral/consult to Social Work    Altered mental status    Overview     Pertinent history of vascular dementia  Following with neurology  Symptoms often exacerbated by UTI. Asymptomatic. No complaints of urinary symptoms.             Renal/    Stage 3a chronic kidney disease (Chronic)    Overview     Chronic.  Control is uncertain.  Patient with comorbid hypertension and diabetes.  Diabetes has been uncontrolled.  BMP  No results found for: NA, K, CL, CO2, BUN, CREATININE, CALCIUM, ANIONGAP, ESTGFRAFRICA, EGFRNONAA           Current Assessment & Plan     Chronic. Stable. Patient with CKD stage 3 comorbidities hypertension diabetes.  Avoid anti-inflammatory medications. Established with nephrology, recommend close follow up.     BMP  Lab Results   Component Value Date     09/19/2022    K 3.4 (L) 09/19/2022     09/19/2022    CO2 26 09/19/2022    BUN 17 09/19/2022    CREATININE 1.10 09/19/2022    CALCIUM 8.3 (L) 09/19/2022    ANIONGAP 10 09/19/2022    EGFRNORACEVR 52 (A) 09/19/2022     Lab Results   Component Value Date    HGBA1C 7.5 (H) 09/19/2022      Latest Reference Range & Units Most Recent   MICROALB/CREAT RATIO 0.0 - 30.0 ug/mg 38.2 (H)  7/22/22 10:57   (H): Data is abnormally high         Frequent UTI    Overview     Chronic  problem.  Often asymptomatic urinary symptoms  She does have worsening AMS and confusion with UTI (see problem list)  She is following with urology; advised to closely follow up  She does have incontinence at baseline and is wearing attends  Frequent ED encounter for UTI  Recently discharged from [facility]. Discharged on Cipro. Completed as prescribed. Recommend recheck urinalysis          Relevant Orders    Urinalysis, Reflex to Urine Culture Urine, Clean Catch (Completed)       Palliative Care    Advanced care planning/counseling discussion    Overview     Patient currently lives with her  who is also elderly and unable to provide much assistance. Her daughter has been living with her trying to provide additional assistance. The patient demonstrates impaired muscle strength, balance, and activity tolerance with need for assistance to complete transfers and gait. She currently has home health + PT/OT with minimal improvement. Her daughter is requesting 24/7 care. She would benefit from skilled therapy in SNF setting with PT interventions to address these deficits and enhance the pt's functional mobility. This was recently addressed during recent hospital encounter. The family declined and wants to take pt home and pursue OP therapy. Family does not want to pursue SNF placement. Referral to social work placed.          Relevant Orders    Ambulatory referral/consult to Social Work       Other    Frequent falls    Overview     Uses walker and wheelchair. Fall precautions. See advanced care planning problem.          Relevant Orders    Ambulatory referral/consult to Social Work    Generalized weakness    Overview     Uses walker and wheelchair. Fall precautions. See advanced care planning problem.          Relevant Orders    Ambulatory referral/consult to Social Work     Other Visit Diagnoses       Hospital discharge follow-up    -  Primary          Follow up in about 1 month (around 2/9/2023), or if symptoms worsen  or fail to improve, for follow up with PCP.  ER precautions for severe or worsening symptoms.     Edna Wallace, NP  _____________________________________________________________________________________________________________________________________________________    CC: hospital follow up     HPI: Patient is a 77-year-old female who presents in clinic today as an established patient here for hospital follow up. She is accompanied by her daughter. She was recently admitted to [facility] from 12/25/22- 12/30/22. Recent encounter reviewed.     History of Present Illness:   Marianne Cali is a 77 y.o. female who presents with complaints of disorientation and elevated glucose.      Ms. Cali carries past medical history of arthritis, asthma, CKD stage III, CAD and previous MI, essential hypertension, GERD, hemiparesis of left dominant side of late effect of cerebral infarction, hiatal hernia, mixed hyperlipidemia, noncompliance with medication regimen, osteoporosis, CVA, type 2 diabetes and vascular dementia.     Information for today's encounter obtained from Mrs. Cali's daughter. She noticed that her mom was becoming confused yesterday and more so today. Previous episodes of increased confusion have been related to recurrent UTIs. At baseline she is confused but not as much as today. Ms. Cali's daughter reports she has been unaware of her mother having any fever or chills and that her mom has not complained of any illness.     The patient was admitted to the hospital for urinary tract infection and altered mental status. The patient does have a baseline history of vascular dementia. Ultimately urinary tract infection on culture grew Enterobacter and unfortunately was resistant to Rocephin. Antibiotics were switched to ciprofloxacin to complete a course. Patient tolerated well with clinical improvement. Acute metabolic encephalopathy resolved however baseline dementia persisted. Family was aware and felt the patient was  back to baseline. Other comorbid conditions were monitored and medications optimized. Patient can follow-up with PCP for further care and medication adjustments. Family did not want the patient placed very even in rehab. They desire to take the patient home to continue her care with her  and daughter.     Patient seen and examined resting comfortably in bed. The patient was disoriented. She remains confused. Oriented only to person. Attempted to call spouse but no answer. I was able to discuss the patient's care with her daughter. She has been living with her daughter. She was doing outpt physical therapy for broken hip. She was able to feed herself. Needs assistance with ADLs, but can walk and toilet. The family was amenable with taking her home as she appears to be at her baseline of vascular dementia      Assessment & Plan  Enterobacter cloacae UTI  -Place empirically on Rocephin unfortunately this strain is resistant. Switch to ciprofloxacin. Pt tolerating.     Acute metabolic encephalopathy due to above  -Treat above condition and monitor for clinical improvement.  -CT head without contrast with age-appropriate changes with chronic periventricular white matter microvascular disease. No evidence of acute or subacute CVA  -Ammonia WNL    Type 2 diabetes with hyperglycemia without long-term use of insulin  - POA glucose 258.  - September 2022 A1c 7.5.  - Accu-Chek before meals and at bedtime.  Accu-Chek as needed.    Chronic anemia  - Hgb 10.2 better than baseline.   - Iron low normal. Venofer 200 mg daily x 5 days.   - Folate WNL. B12 high.     Essential hypertension.  - POA blood pressure 121/88.  - Medications adjusted. As needed hydralazine available     Hyperlipidemia.  - continue Lipitor 80 mg nightly.     CAD.  - Cardiac enzymes negative x 3 for ACS. Cardiology consulted with indication for further cardiac work-up at this time.  -Patient had medication noncompliance without refilling medication for  several months.  -Cardiology recommended continuing current medications ASA, atorvastatin, Coreg, irbesartan and Imdur    Vascular dementia.  Patient remains confused. Discussed with collateral that patient appears to be at baseline which is demented     Transitional Care Note  Family and/or Caretaker present at visit?  Yes  Diagnostic tests reviewed/disposition: No diagnosic tests pending after this hospitalization.  Disease/illness education: Poor historian. Family understands what she had.  Home health/community services discussion/referrals: Patient does have home health established.   Establishment or re-establishment of referral orders for community resources: No other necessary community resources.   Discussion with other health care providers: No discussion with other health care providers necessary. Recommend follow up with specialists- neurology, nephrology, urology, and cardiology.     Since hospital discharge she has been having trouble with ambulation. She has been weak and fatigued. She has a walker and wheelchair that she uses for assistance with ambulation. She currently lives with her  who is also elderly and unable to provide much assistance. Her daughter has been helping. She is requesting 24/7 care. See advanced care planning in problem list above. There has been no recent falls. She has had no chest pain, palpitations, shortness of breath, N/VD, fever, chills. She was discharged home on Cipro which she completed as prescribed. She denies any urinary symptoms though was asymptomatic while recently being treated for UTI. She does have some incontinence which is not new for her. Per family her mental states appears to be back to baseline. Other chronic conditions have been reviewed. Further details as stated above.      Past Medical History:  Past Medical History:   Diagnosis Date    Allergy     Cataract     Dementia     Depression     Diabetes mellitus, type 2     Heart murmur      Hypertension     Stroke      Past Surgical History:   Procedure Laterality Date    EYE SURGERY      HIP REPLACEMENT ARTHROPLASTY      HYSTERECTOMY       Review of patient's allergies indicates:   Allergen Reactions    Morphine Anxiety and Other (See Comments)     Dizziness     Social History     Tobacco Use    Smoking status: Never    Smokeless tobacco: Never   Substance Use Topics    Alcohol use: Never    Drug use: Never     Family History   Problem Relation Age of Onset    Cancer Mother     Hypertension Mother     Cancer Sister      Current Outpatient Medications on File Prior to Visit   Medication Sig Dispense Refill    ACCU-CHEK AMELIA PLUS METER Misc Use 1 device  three times a day 1 each 0    ACCU-CHEK AMELIA PLUS TEST STRP Strp Inject 1 strip into the skin 3 (three) times daily. 100 each 3    aspirin (ECOTRIN) 81 MG EC tablet Take 1 tablet (81 mg total) by mouth once. for 1 dose 30 tablet 3    atorvastatin (LIPITOR) 80 MG tablet Take 80 mg by mouth once daily.      carvediloL (COREG) 3.125 MG tablet TAKE 1 TABLET BY MOUTH EVERY MORNING and ONE IN THE EVENING WITH A MEAL      cholecalciferol, vitamin D3, (VITAMIN D3) 25 mcg (1,000 unit) capsule Take 1 capsule (1,000 Units total) by mouth once daily. 90 capsule 3    donepezil HCl (DONEPEZIL ORAL) donepezil Take No date recorded No form recorded No frequency recorded No route recorded No set duration recorded No set duration amount recorded active No dosage strength recorded No dosage strength units of measure recorded      estradioL (ESTRACE) 0.01 % (0.1 mg/gram) vaginal cream Place 1 g vaginally 3 (three) times a week.      gabapentin (NEURONTIN) 100 MG capsule Take 200 mg by mouth nightly.      irbesartan-hydrochlorothiazide (AVALIDE) 300-12.5 mg per tablet irbesartan 300 mg-hydrochlorothiazide 12.5 mg tablet   TAKE 1 TABLET BY MOUTH EVERY DAY      isosorbide mononitrate (IMDUR) 30 MG 24 hr tablet Take 1 tablet (30 mg total) by mouth once daily. 30 tablet 2     "LANCETS, SUPER THIN Misc TEST BLOOD SUGAR THREE TIMES DAILY AS DIRECTED 100 each 3    metFORMIN (GLUCOPHAGE) 1000 MG tablet Take 1,000 mg by mouth 2 (two) times daily.      ondansetron (ZOFRAN) 4 MG tablet Take 1 tablet (4 mg total) by mouth every 8 (eight) hours as needed for Nausea. 30 tablet 1    semaglutide (OZEMPIC) 0.25 mg or 0.5 mg(2 mg/1.5 mL) pen injector Inject 0.5 mg into the skin once a week. 3 pen 4    TOVIAZ 4 mg Tb24 Take 4 mg by mouth once daily.      JANUVIA 50 mg Tab Take 50 mg by mouth once daily.       No current facility-administered medications on file prior to visit.     Review of Systems   Constitutional:  Positive for fatigue. Negative for appetite change, chills and fever.   HENT:  Negative for congestion, rhinorrhea and sore throat.    Eyes:  Negative for visual disturbance.   Respiratory:  Negative for cough and shortness of breath.    Cardiovascular:  Negative for chest pain, palpitations and leg swelling.   Gastrointestinal:  Negative for abdominal pain, diarrhea and vomiting.   Genitourinary:  Negative for difficulty urinating, dysuria, flank pain, frequency, hematuria and urgency.        Incontinence   Musculoskeletal:  Positive for arthralgias, back pain and gait problem. Negative for myalgias.   Skin:  Negative for rash and wound.   Neurological:  Positive for weakness. Negative for dizziness and headaches.   Psychiatric/Behavioral:  Positive for confusion. Negative for behavioral problems. The patient is not nervous/anxious.      Vitals:    01/09/23 0957   BP: 118/62   BP Location: Right arm   Pulse: 83   SpO2: 97%   Weight: 55 kg (121 lb 4.1 oz)   Height: 5' 9" (1.753 m)     Wt Readings from Last 3 Encounters:   01/09/23 55 kg (121 lb 4.1 oz)   11/09/22 60.8 kg (134 lb)   11/03/22 60.8 kg (134 lb)     Physical Exam  Vitals reviewed.   Constitutional:       General: She is not in acute distress.     Appearance: Normal appearance. She is not ill-appearing.      Comments: thin " "  HENT:      Head: Normocephalic and atraumatic.      Right Ear: External ear normal.      Left Ear: External ear normal.      Nose: Nose normal.   Eyes:      Extraocular Movements: Extraocular movements intact.      Conjunctiva/sclera: Conjunctivae normal.      Pupils: Pupils are equal, round, and reactive to light.   Cardiovascular:      Rate and Rhythm: Normal rate.      Pulses: Normal pulses.      Heart sounds: Normal heart sounds.   Pulmonary:      Effort: Pulmonary effort is normal. No respiratory distress.      Breath sounds: Normal breath sounds.   Abdominal:      General: Bowel sounds are normal.      Palpations: Abdomen is soft.   Musculoskeletal:         General: Normal range of motion.      Cervical back: Normal range of motion.      Right lower leg: No edema.      Left lower leg: No edema.      Comments: Impaired muscle strength and balance   Skin:     General: Skin is warm and dry.      Coloration: Skin is not pale.      Findings: No rash.   Neurological:      Mental Status: She is alert. Mental status is at baseline. She is confused.      Motor: Weakness present.      Gait: Gait abnormal (using wheelchair).      Comments: Oriented to person and place only. Remains confused. At baseline per daughter   Psychiatric:         Attention and Perception: She is inattentive.         Speech: Speech is delayed. Speech is not rapid and pressured or slurred.         Behavior: Behavior is not slowed, withdrawn, hyperactive or combative. Behavior is cooperative.         Cognition and Memory: Cognition is impaired. Memory is impaired.         Judgment: Judgment is impulsive and inappropriate.      Comments: Irritable; arguing with daughter, stating "I'll make you get out of here". Though patient is poor historian and unable to provide adequate history.      Health Maintenance   Topic Date Due    Hepatitis C Screening  Never done    Eye Exam  Never done    DEXA Scan  Never done    Hemoglobin A1c  03/19/2023    Lipid " Panel  07/22/2023    TETANUS VACCINE  08/08/2027     Edna Wallace NP

## 2023-01-13 PROBLEM — R30.0 DYSURIA: Status: RESOLVED | Noted: 2017-04-13 | Resolved: 2023-01-13

## 2023-01-13 PROBLEM — R03.0 ELEVATED BLOOD-PRESSURE READING WITHOUT DIAGNOSIS OF HYPERTENSION: Status: RESOLVED | Noted: 2022-04-19 | Resolved: 2023-01-13

## 2023-01-13 PROBLEM — E11.65 TYPE 2 DIABETES MELLITUS WITH HYPERGLYCEMIA: Status: RESOLVED | Noted: 2019-02-04 | Resolved: 2023-01-13

## 2023-01-13 PROBLEM — N39.0 ACUTE UTI: Status: RESOLVED | Noted: 2021-05-29 | Resolved: 2023-01-13

## 2023-01-13 PROBLEM — M54.32 SCIATICA, LEFT SIDE: Status: RESOLVED | Noted: 2021-07-29 | Resolved: 2023-01-13

## 2023-01-13 PROBLEM — N17.9 AKI (ACUTE KIDNEY INJURY): Status: RESOLVED | Noted: 2019-12-15 | Resolved: 2023-01-13

## 2023-01-13 PROBLEM — I63.9 CEREBROVASCULAR ACCIDENT (CVA): Status: RESOLVED | Noted: 2022-04-19 | Resolved: 2023-01-13

## 2023-01-13 PROBLEM — Z74.09: Status: ACTIVE | Noted: 2023-01-13

## 2023-01-13 PROBLEM — R19.7 DIARRHEA: Status: RESOLVED | Noted: 2022-07-12 | Resolved: 2023-01-13

## 2023-01-13 PROBLEM — E87.6 HYPOKALEMIA: Status: RESOLVED | Noted: 2022-09-18 | Resolved: 2023-01-13

## 2023-01-13 PROBLEM — M79.609 PAIN IN LIMB: Status: RESOLVED | Noted: 2022-04-19 | Resolved: 2023-01-13

## 2023-01-13 PROBLEM — E87.1 HYPONATREMIA: Status: RESOLVED | Noted: 2019-12-15 | Resolved: 2023-01-13

## 2023-01-13 PROBLEM — R40.2430 GLASGOW COMA SCALE TOTAL SCORE 3-8: Status: RESOLVED | Noted: 2020-02-22 | Resolved: 2023-01-13

## 2023-01-13 PROBLEM — N39.0 SEPSIS DUE TO GRAM-NEGATIVE UTI: Status: RESOLVED | Noted: 2021-06-29 | Resolved: 2023-01-13

## 2023-01-13 PROBLEM — E78.00 PURE HYPERCHOLESTEROLEMIA: Status: RESOLVED | Noted: 2018-04-18 | Resolved: 2023-01-13

## 2023-01-13 PROBLEM — Z74.09: Status: RESOLVED | Noted: 2023-01-13 | Resolved: 2023-01-13

## 2023-01-13 PROBLEM — Z71.89 ADVANCED CARE PLANNING/COUNSELING DISCUSSION: Status: ACTIVE | Noted: 2023-01-13

## 2023-01-13 PROBLEM — R40.4 TRANSIENT ALTERATION OF AWARENESS: Status: RESOLVED | Noted: 2017-12-29 | Resolved: 2023-01-13

## 2023-01-13 PROBLEM — M10.9 GOUT: Status: RESOLVED | Noted: 2022-04-19 | Resolved: 2023-01-13

## 2023-01-13 PROBLEM — N30.00 ACUTE CYSTITIS WITHOUT HEMATURIA: Status: RESOLVED | Noted: 2022-09-18 | Resolved: 2023-01-13

## 2023-01-13 PROBLEM — R41.82 ALTERED MENTAL STATUS: Status: ACTIVE | Noted: 2023-01-13

## 2023-01-13 PROBLEM — A41.50 SEPSIS DUE TO GRAM-NEGATIVE UTI: Status: RESOLVED | Noted: 2021-06-29 | Resolved: 2023-01-13

## 2023-01-13 PROBLEM — E83.42 HYPOMAGNESEMIA: Status: RESOLVED | Noted: 2020-02-21 | Resolved: 2023-01-13

## 2023-01-14 NOTE — ASSESSMENT & PLAN NOTE
12/25/22- CT head without contrast with age-appropriate changes with chronic periventricular white matter microvascular disease. No evidence of acute or subacute CVA. No acute issues. On ASA and Statin. Following with neurology. Recommend follow up. Physical therapy, occupational therapy, and nursing treatment through home health.

## 2023-01-14 NOTE — ASSESSMENT & PLAN NOTE
Chronic. Stable. Patient with CKD stage 3 comorbidities hypertension diabetes.  Avoid anti-inflammatory medications. Established with nephrology, recommend close follow up.     BMP  Lab Results   Component Value Date     09/19/2022    K 3.4 (L) 09/19/2022     09/19/2022    CO2 26 09/19/2022    BUN 17 09/19/2022    CREATININE 1.10 09/19/2022    CALCIUM 8.3 (L) 09/19/2022    ANIONGAP 10 09/19/2022    EGFRNORACEVR 52 (A) 09/19/2022     Lab Results   Component Value Date    HGBA1C 7.5 (H) 09/19/2022      Latest Reference Range & Units Most Recent   MICROALB/CREAT RATIO 0.0 - 30.0 ug/mg 38.2 (H)  7/22/22 10:57   (H): Data is abnormally high

## 2023-01-14 NOTE — ASSESSMENT & PLAN NOTE
Chronic. Progressive decline. Taking Donepezil. Following with neurology. Recommend close follow up.

## 2023-02-07 ENCOUNTER — DOCUMENT SCAN (OUTPATIENT)
Dept: HOME HEALTH SERVICES | Facility: HOSPITAL | Age: 78
End: 2023-02-07
Payer: MEDICARE

## 2023-02-08 RX ORDER — ATORVASTATIN CALCIUM 80 MG/1
80 TABLET, FILM COATED ORAL DAILY
Qty: 90 TABLET | Refills: 4 | Status: SHIPPED | OUTPATIENT
Start: 2023-02-08

## 2023-02-08 RX ORDER — SITAGLIPTIN 50 MG/1
50 TABLET, FILM COATED ORAL DAILY
Qty: 90 TABLET | Refills: 4 | Status: SHIPPED | OUTPATIENT
Start: 2023-02-08 | End: 2024-01-19 | Stop reason: SDUPTHER

## 2023-02-08 NOTE — TELEPHONE ENCOUNTER
No new care gaps identified.  [facility] Embedded Care Gaps. Reference number: [id number]. 2/08/2023   10:34:19 AM CST

## 2023-02-08 NOTE — TELEPHONE ENCOUNTER
----- Message from Jessica Gauthier sent at 2/8/2023 10:23 AM CST -----  Contact: nitin  Type:  RX Refill Request    Who Called: Nitin   Refill or New Rx: refil   RX Name and Strength:atorvastatin (LIPITOR) 80 MG tablet  How is the patient currently taking it? (ex. 1XDay):Take 80 mg by mouth once daily  Is this a 30 day or 90 day RX: 30    RX Name and Strength:JANUVIA 50 mg Tab  How is the patient currently taking it?  Take 50 mg by mouth once daily  Is this a 30 day or 90 day RX: 30 30    Preferred Pharmacy with phone number:   [facility] [place] - [address] [place]  [address] [address]  Phone: [phone] Fax: [phone]     Local or Mail Order: local   Ordering Provider: Amanda   Would the patient rather a call back or a response via My [facility]? call  Best Call Back Number: [phone] (home)    Additional Information:  The patient daughter called

## 2023-03-09 ENCOUNTER — OFFICE VISIT (OUTPATIENT)
Dept: UROLOGY | Facility: CLINIC | Age: 78
End: 2023-03-09
Payer: MEDICARE

## 2023-03-09 VITALS
SYSTOLIC BLOOD PRESSURE: 165 MMHG | RESPIRATION RATE: 18 BRPM | TEMPERATURE: 98 F | HEIGHT: 69 IN | HEART RATE: 80 BPM | BODY MASS INDEX: 17.73 KG/M2 | WEIGHT: 119.69 LBS | DIASTOLIC BLOOD PRESSURE: 87 MMHG

## 2023-03-09 DIAGNOSIS — N39.0 RECURRENT UTI: Primary | ICD-10-CM

## 2023-03-09 PROCEDURE — 51798 US URINE CAPACITY MEASURE: CPT | Mod: S$GLB,,, | Performed by: NURSE PRACTITIONER

## 2023-03-09 PROCEDURE — 3079F DIAST BP 80-89 MM HG: CPT | Mod: CPTII,S$GLB,, | Performed by: NURSE PRACTITIONER

## 2023-03-09 PROCEDURE — 1126F PR PAIN SEVERITY QUANTIFIED, NO PAIN PRESENT: ICD-10-PCS | Mod: CPTII,S$GLB,, | Performed by: NURSE PRACTITIONER

## 2023-03-09 PROCEDURE — 3288F PR FALLS RISK ASSESSMENT DOCUMENTED: ICD-10-PCS | Mod: CPTII,S$GLB,, | Performed by: NURSE PRACTITIONER

## 2023-03-09 PROCEDURE — 51798 PR MEAS,POST-VOID RES,US,NON-IMAGING: ICD-10-PCS | Mod: S$GLB,,, | Performed by: NURSE PRACTITIONER

## 2023-03-09 PROCEDURE — 3079F PR MOST RECENT DIASTOLIC BLOOD PRESSURE 80-89 MM HG: ICD-10-PCS | Mod: CPTII,S$GLB,, | Performed by: NURSE PRACTITIONER

## 2023-03-09 PROCEDURE — 3288F FALL RISK ASSESSMENT DOCD: CPT | Mod: CPTII,S$GLB,, | Performed by: NURSE PRACTITIONER

## 2023-03-09 PROCEDURE — 1126F AMNT PAIN NOTED NONE PRSNT: CPT | Mod: CPTII,S$GLB,, | Performed by: NURSE PRACTITIONER

## 2023-03-09 PROCEDURE — 81002 PR URINALYSIS NONAUTO W/O SCOPE: ICD-10-PCS | Mod: S$GLB,,, | Performed by: NURSE PRACTITIONER

## 2023-03-09 PROCEDURE — 87088 URINE BACTERIA CULTURE: CPT | Performed by: NURSE PRACTITIONER

## 2023-03-09 PROCEDURE — 1101F PR PT FALLS ASSESS DOC 0-1 FALLS W/OUT INJ PAST YR: ICD-10-PCS | Mod: CPTII,S$GLB,, | Performed by: NURSE PRACTITIONER

## 2023-03-09 PROCEDURE — 87186 SC STD MICRODIL/AGAR DIL: CPT | Performed by: NURSE PRACTITIONER

## 2023-03-09 PROCEDURE — 87086 URINE CULTURE/COLONY COUNT: CPT | Performed by: NURSE PRACTITIONER

## 2023-03-09 PROCEDURE — 99214 PR OFFICE/OUTPT VISIT, EST, LEVL IV, 30-39 MIN: ICD-10-PCS | Mod: S$GLB,,, | Performed by: NURSE PRACTITIONER

## 2023-03-09 PROCEDURE — 87077 CULTURE AEROBIC IDENTIFY: CPT | Performed by: NURSE PRACTITIONER

## 2023-03-09 PROCEDURE — 1101F PT FALLS ASSESS-DOCD LE1/YR: CPT | Mod: CPTII,S$GLB,, | Performed by: NURSE PRACTITIONER

## 2023-03-09 PROCEDURE — 1159F PR MEDICATION LIST DOCUMENTED IN MEDICAL RECORD: ICD-10-PCS | Mod: CPTII,S$GLB,, | Performed by: NURSE PRACTITIONER

## 2023-03-09 PROCEDURE — 81002 URINALYSIS NONAUTO W/O SCOPE: CPT | Mod: S$GLB,,, | Performed by: NURSE PRACTITIONER

## 2023-03-09 PROCEDURE — 3077F PR MOST RECENT SYSTOLIC BLOOD PRESSURE >= 140 MM HG: ICD-10-PCS | Mod: CPTII,S$GLB,, | Performed by: NURSE PRACTITIONER

## 2023-03-09 PROCEDURE — 99999 PR PBB SHADOW E&M-EST. PATIENT-LVL IV: ICD-10-PCS | Mod: PBBFAC,,, | Performed by: NURSE PRACTITIONER

## 2023-03-09 PROCEDURE — 99999 PR PBB SHADOW E&M-EST. PATIENT-LVL IV: CPT | Mod: PBBFAC,,, | Performed by: NURSE PRACTITIONER

## 2023-03-09 PROCEDURE — 99214 OFFICE O/P EST MOD 30 MIN: CPT | Mod: S$GLB,,, | Performed by: NURSE PRACTITIONER

## 2023-03-09 PROCEDURE — 3077F SYST BP >= 140 MM HG: CPT | Mod: CPTII,S$GLB,, | Performed by: NURSE PRACTITIONER

## 2023-03-09 PROCEDURE — 1159F MED LIST DOCD IN RCRD: CPT | Mod: CPTII,S$GLB,, | Performed by: NURSE PRACTITIONER

## 2023-03-09 NOTE — PROGRESS NOTES
Chief Complaint:   Recurrent urinary tract infections    HPI:   Patient is a 77-year-old female that is presenting with a family member.  Patient was seen for recurrent urinary tract infections and has been asymptomatic since our last visit.  Urine in clinic indicates trace leukocytes, all other parameters are negative.  At last visit, patient was frail and a wheelchair, patient is able to ambulate and void independently.  Patient has had a renal ultrasound in the last year which was normal.  11/09/2022  Patient is a 77-year-old female that is presenting with her family.  Family states that patient has had recurrent E coli cystitis.  Patient is in a wheelchair in wears a depends.  No history of gross hematuria or renal stones.  Recent renal ultrasound was negative for renal stones, masses or hydronephrosis. Urine in clinic is negative.    Allergies:  Morphine    Medications:  has a current medication list which includes the following prescription(s): accu-chek candida plus meter, accu-chek candida plus test strp, aspirin, atorvastatin, carvedilol, cholecalciferol (vitamin d3), donepezil hcl, estradiol, gabapentin, irbesartan-hydrochlorothiazide, isosorbide mononitrate, januvia, lancets, super thin, metformin, ondansetron, ozempic, and toviaz.    Review of Systems:  General: No fever, chills, fatigability, or weight loss.  Skin: No rashes, itching, or changes in color or texture of skin.  Chest: Denies LIANG, cyanosis, wheezing, cough, and sputum production.  Abdomen: Appetite fine. No weight loss. Denies diarrhea, abdominal pain, hematemesis, or blood in stool.  Musculoskeletal: No joint stiffness or swelling. Denies back pain.  : As above.  All other review of systems negative.    PMH:   has a past medical history of Allergy, Cataract, Dementia, Depression, Diabetes mellitus, type 2, Heart murmur, Hypertension, and Stroke.    PSH:   has a past surgical history that includes Hip replacement arthroplasty; Eye surgery;  and Hysterectomy.    FamHx: family history includes Cancer in her mother and sister; Hypertension in her mother.    SocHx:  reports that she has never smoked. She has never used smokeless tobacco. She reports that she does not drink alcohol and does not use drugs.      Physical Exam:  Vitals:    03/09/23 1133   BP: (!) 165/87   Pulse: 80   Resp: 18   Temp: 98 °F (36.7 °C)     General: A&Ox3, no apparent distress, no deformities  Neck: No masses, normal thyroid  Lungs: normal inspiration, no use of accessory muscles  Heart: normal pulse, no arrhythmias  Abdomen: Soft, NT, ND, no masses, no hernias, no hepatosplenomegaly  Lymphatic: Neck and groin nodes negative    Labs/Studies:   See HPI    Impression/Plan:   Urinary tract infections  Patient is doing much better, asymptomatic.  Will send urine for culture, if negative patient can follow-up with our clinic p.r.n..

## 2023-03-10 RX ORDER — CEFDINIR 300 MG/1
300 CAPSULE ORAL 2 TIMES DAILY
Qty: 20 CAPSULE | Refills: 0 | Status: SHIPPED | OUTPATIENT
Start: 2023-03-10 | End: 2023-03-20

## 2023-03-11 LAB — BACTERIA UR CULT: ABNORMAL

## 2023-03-16 ENCOUNTER — TELEPHONE (OUTPATIENT)
Dept: FAMILY MEDICINE | Facility: CLINIC | Age: 78
End: 2023-03-16
Payer: MEDICARE

## 2023-04-11 ENCOUNTER — OFFICE VISIT (OUTPATIENT)
Dept: UROLOGY | Facility: CLINIC | Age: 78
End: 2023-04-11
Payer: MEDICARE

## 2023-04-11 VITALS — HEIGHT: 69 IN | WEIGHT: 119 LBS | BODY MASS INDEX: 17.63 KG/M2

## 2023-04-11 DIAGNOSIS — N30.00 ACUTE CYSTITIS WITHOUT HEMATURIA: Primary | ICD-10-CM

## 2023-04-11 PROBLEM — R55 SYNCOPE: Status: RESOLVED | Noted: 2020-02-21 | Resolved: 2023-04-11

## 2023-04-11 PROBLEM — R53.1 GENERALIZED WEAKNESS: Status: RESOLVED | Noted: 2021-05-29 | Resolved: 2023-04-11

## 2023-04-11 PROCEDURE — 1159F MED LIST DOCD IN RCRD: CPT | Mod: CPTII,S$GLB,, | Performed by: NURSE PRACTITIONER

## 2023-04-11 PROCEDURE — 1126F AMNT PAIN NOTED NONE PRSNT: CPT | Mod: CPTII,S$GLB,, | Performed by: NURSE PRACTITIONER

## 2023-04-11 PROCEDURE — 1126F PR PAIN SEVERITY QUANTIFIED, NO PAIN PRESENT: ICD-10-PCS | Mod: CPTII,S$GLB,, | Performed by: NURSE PRACTITIONER

## 2023-04-11 PROCEDURE — 99214 PR OFFICE/OUTPT VISIT, EST, LEVL IV, 30-39 MIN: ICD-10-PCS | Mod: S$GLB,,, | Performed by: NURSE PRACTITIONER

## 2023-04-11 PROCEDURE — 87088 URINE BACTERIA CULTURE: CPT | Performed by: NURSE PRACTITIONER

## 2023-04-11 PROCEDURE — 99999 PR PBB SHADOW E&M-EST. PATIENT-LVL III: CPT | Mod: PBBFAC,,, | Performed by: NURSE PRACTITIONER

## 2023-04-11 PROCEDURE — 87086 URINE CULTURE/COLONY COUNT: CPT | Performed by: NURSE PRACTITIONER

## 2023-04-11 PROCEDURE — 87077 CULTURE AEROBIC IDENTIFY: CPT | Performed by: NURSE PRACTITIONER

## 2023-04-11 PROCEDURE — 99214 OFFICE O/P EST MOD 30 MIN: CPT | Mod: S$GLB,,, | Performed by: NURSE PRACTITIONER

## 2023-04-11 PROCEDURE — 87186 SC STD MICRODIL/AGAR DIL: CPT | Performed by: NURSE PRACTITIONER

## 2023-04-11 PROCEDURE — 1159F PR MEDICATION LIST DOCUMENTED IN MEDICAL RECORD: ICD-10-PCS | Mod: CPTII,S$GLB,, | Performed by: NURSE PRACTITIONER

## 2023-04-11 PROCEDURE — 99999 PR PBB SHADOW E&M-EST. PATIENT-LVL III: ICD-10-PCS | Mod: PBBFAC,,, | Performed by: NURSE PRACTITIONER

## 2023-04-11 NOTE — PROGRESS NOTES
Chief Complaint:   E- coli cystitis    HPI:   Patient is a 77-year-old female that is presenting with her granddaughter.  Patient was recently treated for E-coli cystitis.  Is currently asymptomatic.  Unable to give urine sample, will proceed with catheter sample.  Denies gross hematuria.  03/09/2023  Patient is a 77-year-old female that is presenting with a family member.  Patient was seen for recurrent urinary tract infections and has been asymptomatic since our last visit.  Urine in clinic indicates trace leukocytes, all other parameters are negative.  At last visit, patient was frail and a wheelchair, patient is able to ambulate and void independently.  Patient has had a renal ultrasound in the last year which was normal.  11/09/2022  Patient is a 77-year-old female that is presenting with her family.  Family states that patient has had recurrent E coli cystitis.  Patient is in a wheelchair in wears a depends.  No history of gross hematuria or renal stones.  Recent renal ultrasound was negative for renal stones, masses or hydronephrosis. Urine in clinic is negative.       Allergies:  Morphine    Medications:  has a current medication list which includes the following prescription(s): accu-chek candida plus meter, accu-chek candida plus test strp, aspirin, atorvastatin, carvedilol, cholecalciferol (vitamin d3), donepezil hcl, estradiol, gabapentin, irbesartan-hydrochlorothiazide, isosorbide mononitrate, januvia, lancets, super thin, metformin, ondansetron, ozempic, and toviaz.    Review of Systems:  General: No fever, chills, fatigability, or weight loss.  Skin: No rashes, itching, or changes in color or texture of skin.  Chest: Denies LIANG, cyanosis, wheezing, cough, and sputum production.  Abdomen: Appetite fine. No weight loss. Denies diarrhea, abdominal pain, hematemesis, or blood in stool.  Musculoskeletal: No joint stiffness or swelling. Denies back pain.  : As above.  All other review of systems  negative.    PMH:   has a past medical history of Allergy, Cataract, Dementia, Depression, Diabetes mellitus, type 2, Heart murmur, Hypertension, and Stroke.    PSH:   has a past surgical history that includes Hip replacement arthroplasty; Eye surgery; and Hysterectomy.    FamHx: family history includes Cancer in her mother and sister; Hypertension in her mother.    SocHx:  reports that she has never smoked. She has never used smokeless tobacco. She reports that she does not drink alcohol and does not use drugs.      Physical Exam:  General: A&Ox3, no apparent distress, no deformities  Neck: No masses, normal thyroid  Lungs: normal inspiration, no use of accessory muscles  Heart: normal pulse, no arrhythmias  Abdomen: Soft, NT, ND, no masses, no hernias, no hepatosplenomegaly  Lymphatic: Neck and groin nodes negative    Impression/Plan:   Recurrent urinary tract infections  Patient and granddaughter were educated on behavior modifications needed to decrease recurrent E coli cystitis, in females.  Will send catheter sample for urine culture.  Will contact her with results and needed follow-up.  If urine culture is negative patient can return to clinic in 3 months for re-evaluation.

## 2023-04-13 ENCOUNTER — TELEPHONE (OUTPATIENT)
Dept: UROLOGY | Facility: CLINIC | Age: 78
End: 2023-04-13
Payer: MEDICARE

## 2023-04-13 LAB — BACTERIA UR CULT: ABNORMAL

## 2023-04-13 RX ORDER — NITROFURANTOIN 25; 75 MG/1; MG/1
100 CAPSULE ORAL 2 TIMES DAILY
Qty: 20 CAPSULE | Refills: 0 | Status: SHIPPED | OUTPATIENT
Start: 2023-04-13 | End: 2023-04-20

## 2023-04-13 RX ORDER — CEFDINIR 300 MG/1
300 CAPSULE ORAL 2 TIMES DAILY
Qty: 20 CAPSULE | Refills: 0 | Status: SHIPPED | OUTPATIENT
Start: 2023-04-13 | End: 2023-04-13

## 2023-04-13 NOTE — TELEPHONE ENCOUNTER
----- Message from Familia Gallardo sent at 4/13/2023  9:25 AM CDT -----  Contact: [phone]  [facility] requesting a call back in regards to cefdinir (OMNICEF) 300 MG capsule being on back order. Please call back at [phone]. Thanks KD

## 2023-04-17 PROBLEM — N39.0 FREQUENT UTI: Status: RESOLVED | Noted: 2021-05-29 | Resolved: 2023-04-17

## 2023-04-20 ENCOUNTER — LAB VISIT (OUTPATIENT)
Dept: LAB | Facility: HOSPITAL | Age: 78
End: 2023-04-20
Attending: FAMILY MEDICINE
Payer: MEDICARE

## 2023-04-20 ENCOUNTER — OFFICE VISIT (OUTPATIENT)
Dept: FAMILY MEDICINE | Facility: CLINIC | Age: 78
End: 2023-04-20
Payer: MEDICARE

## 2023-04-20 VITALS
WEIGHT: 129 LBS | HEART RATE: 89 BPM | DIASTOLIC BLOOD PRESSURE: 82 MMHG | HEIGHT: 69 IN | OXYGEN SATURATION: 96 % | SYSTOLIC BLOOD PRESSURE: 130 MMHG | BODY MASS INDEX: 19.11 KG/M2

## 2023-04-20 DIAGNOSIS — I15.2 HYPERTENSION ASSOCIATED WITH DIABETES: Chronic | ICD-10-CM

## 2023-04-20 DIAGNOSIS — E78.5 HYPERLIPIDEMIA ASSOCIATED WITH TYPE 2 DIABETES MELLITUS: ICD-10-CM

## 2023-04-20 DIAGNOSIS — E78.5 HYPERLIPIDEMIA ASSOCIATED WITH TYPE 2 DIABETES MELLITUS: Chronic | ICD-10-CM

## 2023-04-20 DIAGNOSIS — N18.31 TYPE 2 DIABETES MELLITUS WITH STAGE 3A CHRONIC KIDNEY DISEASE, WITHOUT LONG-TERM CURRENT USE OF INSULIN: Primary | ICD-10-CM

## 2023-04-20 DIAGNOSIS — N18.31 STAGE 3A CHRONIC KIDNEY DISEASE: Chronic | ICD-10-CM

## 2023-04-20 DIAGNOSIS — E11.69 HYPERLIPIDEMIA ASSOCIATED WITH TYPE 2 DIABETES MELLITUS: Chronic | ICD-10-CM

## 2023-04-20 DIAGNOSIS — I15.2 HYPERTENSION ASSOCIATED WITH DIABETES: ICD-10-CM

## 2023-04-20 DIAGNOSIS — E11.22 TYPE 2 DIABETES MELLITUS WITH STAGE 3A CHRONIC KIDNEY DISEASE, WITHOUT LONG-TERM CURRENT USE OF INSULIN: Primary | ICD-10-CM

## 2023-04-20 DIAGNOSIS — E11.59 HYPERTENSION ASSOCIATED WITH DIABETES: ICD-10-CM

## 2023-04-20 DIAGNOSIS — F01.B3 MODERATE VASCULAR DEMENTIA WITH MOOD DISTURBANCE: ICD-10-CM

## 2023-04-20 DIAGNOSIS — Z79.899 ENCOUNTER FOR LONG-TERM (CURRENT) USE OF MEDICATIONS: ICD-10-CM

## 2023-04-20 DIAGNOSIS — Z79.899 ENCOUNTER FOR LONG-TERM (CURRENT) USE OF MEDICATIONS: Chronic | ICD-10-CM

## 2023-04-20 DIAGNOSIS — E11.65 TYPE 2 DIABETES MELLITUS WITH HYPERGLYCEMIA, WITHOUT LONG-TERM CURRENT USE OF INSULIN: ICD-10-CM

## 2023-04-20 DIAGNOSIS — E11.69 HYPERLIPIDEMIA ASSOCIATED WITH TYPE 2 DIABETES MELLITUS: ICD-10-CM

## 2023-04-20 DIAGNOSIS — Z00.00 ENCOUNTER FOR MEDICAL EXAMINATION TO ESTABLISH CARE: ICD-10-CM

## 2023-04-20 DIAGNOSIS — E11.59 HYPERTENSION ASSOCIATED WITH DIABETES: Chronic | ICD-10-CM

## 2023-04-20 LAB
ALBUMIN SERPL BCP-MCNC: 3.4 G/DL (ref 3.5–5.2)
ALP SERPL-CCNC: 121 U/L (ref 55–135)
ALT SERPL W/O P-5'-P-CCNC: 19 U/L (ref 10–44)
ANION GAP SERPL CALC-SCNC: 6 MMOL/L (ref 8–16)
AST SERPL-CCNC: 23 U/L (ref 10–40)
BILIRUB SERPL-MCNC: 0.4 MG/DL (ref 0.1–1)
BUN SERPL-MCNC: 33 MG/DL (ref 8–23)
CALCIUM SERPL-MCNC: 9.4 MG/DL (ref 8.7–10.5)
CHLORIDE SERPL-SCNC: 106 MMOL/L (ref 95–110)
CHOLEST SERPL-MCNC: 140 MG/DL (ref 120–199)
CHOLEST/HDLC SERPL: 4 {RATIO} (ref 2–5)
CO2 SERPL-SCNC: 28 MMOL/L (ref 23–29)
CREAT SERPL-MCNC: 1 MG/DL (ref 0.5–1.4)
ERYTHROCYTE [DISTWIDTH] IN BLOOD BY AUTOMATED COUNT: 16.2 % (ref 11.5–14.5)
EST. GFR  (NO RACE VARIABLE): 58.4 ML/MIN/1.73 M^2
ESTIMATED AVG GLUCOSE: 134 MG/DL (ref 68–131)
GLUCOSE SERPL-MCNC: 86 MG/DL (ref 70–110)
HBA1C MFR BLD: 6.3 % (ref 4–5.6)
HCT VFR BLD AUTO: 32.5 % (ref 37–48.5)
HDLC SERPL-MCNC: 35 MG/DL (ref 40–75)
HDLC SERPL: 25 % (ref 20–50)
HGB BLD-MCNC: 9.5 G/DL (ref 12–16)
LDLC SERPL CALC-MCNC: 82.6 MG/DL (ref 63–159)
MCH RBC QN AUTO: 27.1 PG (ref 27–31)
MCHC RBC AUTO-ENTMCNC: 29.2 G/DL (ref 32–36)
MCV RBC AUTO: 93 FL (ref 82–98)
NONHDLC SERPL-MCNC: 105 MG/DL
PLATELET # BLD AUTO: 243 K/UL (ref 150–450)
PMV BLD AUTO: 11.1 FL (ref 9.2–12.9)
POTASSIUM SERPL-SCNC: 3.9 MMOL/L (ref 3.5–5.1)
PROT SERPL-MCNC: 8.3 G/DL (ref 6–8.4)
RBC # BLD AUTO: 3.5 M/UL (ref 4–5.4)
SODIUM SERPL-SCNC: 140 MMOL/L (ref 136–145)
TRIGL SERPL-MCNC: 112 MG/DL (ref 30–150)
WBC # BLD AUTO: 5.26 K/UL (ref 3.9–12.7)

## 2023-04-20 PROCEDURE — 1160F RVW MEDS BY RX/DR IN RCRD: CPT | Mod: CPTII,S$GLB,, | Performed by: NURSE PRACTITIONER

## 2023-04-20 PROCEDURE — 83036 HEMOGLOBIN GLYCOSYLATED A1C: CPT | Performed by: FAMILY MEDICINE

## 2023-04-20 PROCEDURE — 3079F DIAST BP 80-89 MM HG: CPT | Mod: CPTII,S$GLB,, | Performed by: NURSE PRACTITIONER

## 2023-04-20 PROCEDURE — 1126F AMNT PAIN NOTED NONE PRSNT: CPT | Mod: CPTII,S$GLB,, | Performed by: NURSE PRACTITIONER

## 2023-04-20 PROCEDURE — 99999 PR PBB SHADOW E&M-EST. PATIENT-LVL V: CPT | Mod: PBBFAC,,, | Performed by: NURSE PRACTITIONER

## 2023-04-20 PROCEDURE — 3079F PR MOST RECENT DIASTOLIC BLOOD PRESSURE 80-89 MM HG: ICD-10-PCS | Mod: CPTII,S$GLB,, | Performed by: NURSE PRACTITIONER

## 2023-04-20 PROCEDURE — 1159F MED LIST DOCD IN RCRD: CPT | Mod: CPTII,S$GLB,, | Performed by: NURSE PRACTITIONER

## 2023-04-20 PROCEDURE — 99999 PR PBB SHADOW E&M-EST. PATIENT-LVL V: ICD-10-PCS | Mod: PBBFAC,,, | Performed by: NURSE PRACTITIONER

## 2023-04-20 PROCEDURE — 1160F PR REVIEW ALL MEDS BY PRESCRIBER/CLIN PHARMACIST DOCUMENTED: ICD-10-PCS | Mod: CPTII,S$GLB,, | Performed by: NURSE PRACTITIONER

## 2023-04-20 PROCEDURE — 1126F PR PAIN SEVERITY QUANTIFIED, NO PAIN PRESENT: ICD-10-PCS | Mod: CPTII,S$GLB,, | Performed by: NURSE PRACTITIONER

## 2023-04-20 PROCEDURE — 1159F PR MEDICATION LIST DOCUMENTED IN MEDICAL RECORD: ICD-10-PCS | Mod: CPTII,S$GLB,, | Performed by: NURSE PRACTITIONER

## 2023-04-20 PROCEDURE — 99214 PR OFFICE/OUTPT VISIT, EST, LEVL IV, 30-39 MIN: ICD-10-PCS | Mod: S$GLB,,, | Performed by: NURSE PRACTITIONER

## 2023-04-20 PROCEDURE — 80061 LIPID PANEL: CPT | Performed by: FAMILY MEDICINE

## 2023-04-20 PROCEDURE — 36415 COLL VENOUS BLD VENIPUNCTURE: CPT | Mod: PO | Performed by: FAMILY MEDICINE

## 2023-04-20 PROCEDURE — 80053 COMPREHEN METABOLIC PANEL: CPT | Performed by: FAMILY MEDICINE

## 2023-04-20 PROCEDURE — 1101F PT FALLS ASSESS-DOCD LE1/YR: CPT | Mod: CPTII,S$GLB,, | Performed by: NURSE PRACTITIONER

## 2023-04-20 PROCEDURE — 3288F PR FALLS RISK ASSESSMENT DOCUMENTED: ICD-10-PCS | Mod: CPTII,S$GLB,, | Performed by: NURSE PRACTITIONER

## 2023-04-20 PROCEDURE — 3288F FALL RISK ASSESSMENT DOCD: CPT | Mod: CPTII,S$GLB,, | Performed by: NURSE PRACTITIONER

## 2023-04-20 PROCEDURE — 3075F PR MOST RECENT SYSTOLIC BLOOD PRESS GE 130-139MM HG: ICD-10-PCS | Mod: CPTII,S$GLB,, | Performed by: NURSE PRACTITIONER

## 2023-04-20 PROCEDURE — 1101F PR PT FALLS ASSESS DOC 0-1 FALLS W/OUT INJ PAST YR: ICD-10-PCS | Mod: CPTII,S$GLB,, | Performed by: NURSE PRACTITIONER

## 2023-04-20 PROCEDURE — 85027 COMPLETE CBC AUTOMATED: CPT | Performed by: NURSE PRACTITIONER

## 2023-04-20 PROCEDURE — 99214 OFFICE O/P EST MOD 30 MIN: CPT | Mod: S$GLB,,, | Performed by: NURSE PRACTITIONER

## 2023-04-20 PROCEDURE — 3075F SYST BP GE 130 - 139MM HG: CPT | Mod: CPTII,S$GLB,, | Performed by: NURSE PRACTITIONER

## 2023-04-20 RX ORDER — LIDOCAINE 50 MG/G
PATCH TOPICAL
COMMUNITY
Start: 2023-03-18 | End: 2024-01-19

## 2023-04-20 RX ORDER — CIPROFLOXACIN 500 MG/1
TABLET ORAL
COMMUNITY
Start: 2022-12-30 | End: 2023-04-20

## 2023-04-20 RX ORDER — DICLOFENAC SODIUM 10 MG/G
GEL TOPICAL
COMMUNITY
Start: 2023-02-27

## 2023-04-20 RX ORDER — CEFDINIR 300 MG/1
CAPSULE ORAL
COMMUNITY
Start: 2023-04-13 | End: 2023-08-29 | Stop reason: ALTCHOICE

## 2023-04-20 NOTE — ASSESSMENT & PLAN NOTE
Chronic. Stable. Patient with CKD stage 3 comorbidities hypertension diabetes.  Avoid anti-inflammatory medications. Established with nephrology, recommend close follow up. Repeat labs today.    BMP  Lab Results   Component Value Date     09/19/2022    K 3.4 (L) 09/19/2022     09/19/2022    CO2 26 09/19/2022    BUN 17 09/19/2022    CREATININE 1.10 09/19/2022    CALCIUM 8.3 (L) 09/19/2022    ANIONGAP 10 09/19/2022    EGFRNORACEVR 52 (A) 09/19/2022     Lab Results   Component Value Date    HGBA1C 7.5 (H) 09/19/2022      Latest Reference Range & Units Most Recent   MICROALB/CREAT RATIO 0.0 - 30.0 ug/mg 38.2 (H)  7/22/22 10:57   (H): Data is abnormally high

## 2023-04-20 NOTE — ASSESSMENT & PLAN NOTE
Update labs today.Complete history and physical was completed today.  Complete and thorough medication reconciliation was performed.  Discussed risks and benefits of medications.  Advised patient on orders and health maintenance.  We discussed old records and old labs if available.  Will request any records not available through epic.  Continue current medications listed on your summary sheet.

## 2023-04-20 NOTE — PROGRESS NOTES
Assessment/Plan:  Problem List Items Addressed This Visit          Neuro    Vascular dementia    Current Assessment & Plan     Chronic. Taking Donepezil. Following with neurology. Recommend close follow up.              Cardiac/Vascular    Hypertension associated with diabetes (Chronic)    Overview     CHRONIC. STABLE. BP Reviewed.  Compliant with BP medications. No SE reported.   (-) CP, SOB, palpitations, dizziness, lightheadedness, HA, arm numbness, tingling or weakness, syncope.  No results found for: CREATININE           Current Assessment & Plan     BP at goal today. Compliant with medications. Follow-up with Cardiology and Nephrology.  Counseled on importance of hypertension disease course, I recommend ongoing Education for DASH-diet and exercise. Counseled on medication regimen importance of treating high blood pressure. Please be advised of risk of untreated blood pressure as discussed. Please advised of ER precautions were given for symptoms of hypertensive urgency and emergency.  Hypertension Medications               carvediloL (COREG) 3.125 MG tablet TAKE 1 TABLET BY MOUTH EVERY MORNING and ONE IN THE EVENING WITH A MEAL    irbesartan-hydrochlorothiazide (AVALIDE) 300-12.5 mg per tablet irbesartan 300 mg-hydrochlorothiazide 12.5 mg tablet   TAKE 1 TABLET BY MOUTH EVERY DAY    isosorbide mononitrate (IMDUR) 30 MG 24 hr tablet Take 1 tablet (30 mg total) by mouth once daily.          Hyperlipidemia associated with type 2 diabetes mellitus (Chronic)    Overview     CHRONIC. STABLE. Lab analysis reviewed.   (-) CP, SOB, abdominal pain, N/V/D, constipation, jaundice, skin changes.  (-) Myalgias  Lab Results   Component Value Date    CHOL 136 07/22/2022    CHOL 186 06/23/2022    CHOL 158 04/13/2022     Lab Results   Component Value Date    HDL 22 (L) 07/22/2022    HDL 24 (L) 06/23/2022    HDL 23 (L) 04/13/2022     Lab Results   Component Value Date    LDLCALC 90.2 07/22/2022    LDLCALC 118 (H) 06/23/2022     LDLCALC 85 04/13/2022     Lab Results   Component Value Date    TRIG 119 07/22/2022    TRIG 220 (H) 06/23/2022    TRIG 249 (H) 04/13/2022     Lab Results   Component Value Date    CHOLHDL 16.2 (L) 07/22/2022    CHOLHDL 7.8 (H) 06/23/2022    CHOLHDL 6.9 (H) 04/13/2022     Lab Results   Component Value Date    TOTALCHOLEST 6.2 (H) 07/22/2022     Lab Results   Component Value Date    ALT 12 09/18/2022    AST 29 09/18/2022    ALKPHOS 78 09/18/2022    BILITOT 0.8 09/18/2022   ======================================================           Current Assessment & Plan     Continue statin. Counseled on hyperlipidemia disease course, healthy diet and increased need for exercise. Please be advised of the risk of cardiovascular disease, increase stroke and heart attack risk with uncontrolled/untreated hyperlipidemia. Patient voiced understanding and understood the treatment plan. All questions were answered.             Renal/    Stage 3a chronic kidney disease (Chronic)    Overview     Chronic.  Control is uncertain.  Patient with comorbid hypertension and diabetes.  Diabetes has been uncontrolled.  BMP  No results found for: NA, K, CL, CO2, BUN, CREATININE, CALCIUM, ANIONGAP, ESTGFRAFRICA, EGFRNONAA             Current Assessment & Plan     Chronic. Stable. Patient with CKD stage 3 comorbidities hypertension diabetes.  Avoid anti-inflammatory medications. Established with nephrology, recommend close follow up. Repeat labs today.    BMP  Lab Results   Component Value Date     09/19/2022    K 3.4 (L) 09/19/2022     09/19/2022    CO2 26 09/19/2022    BUN 17 09/19/2022    CREATININE 1.10 09/19/2022    CALCIUM 8.3 (L) 09/19/2022    ANIONGAP 10 09/19/2022    EGFRNORACEVR 52 (A) 09/19/2022     Lab Results   Component Value Date    HGBA1C 7.5 (H) 09/19/2022      Latest Reference Range & Units Most Recent   MICROALB/CREAT RATIO 0.0 - 30.0 ug/mg 38.2 (H)  7/22/22 10:57   (H): Data is abnormally high         Relevant  Orders    CBC Without Differential       Endocrine    Type 2 diabetes mellitus - Primary    Overview     July 2022:  Patient with recent hospitalization.  Patient reports that she is taking the Ozempic 0.25 milligrams.  She is having some nausea but otherwise has been better controlled.  Patient is due for A1c.  April 2022:  Patient with uncontrolled A1c from last labs performed [place].  Currently on metformin 1000 milligrams twice daily, Januvia 100 milligrams, glipizide 10 milligrams twice daily.  Noncompliance with diet.  Denies history of pancreatitis, thyroid cancer, MEN syndrome.    Diabetes Management Status    Statin: Taking  ACE/ARB: Taking    Screening or Prevention Patient's value Goal Complete/Controlled?   HgA1C Testing and Control   Lab Results   Component Value Date    HGBA1C 10.8 (H) 04/13/2022      Annually/Less than 8% No   Lipid profile : 06/23/2022 Annually No   LDL control Lab Results   Component Value Date    LDLCALC 118 (H) 06/23/2022    Annually/Less than 100 mg/dl  No   Nephropathy screening No results found for: LABMICR  Lab Results   Component Value Date    PROTEINUA Negative 05/06/2022     Lab Results   Component Value Date    UTPCR 0.15 05/06/2022      Annually Yes   Blood pressure BP Readings from Last 1 Encounters:   07/19/22 126/70    Less than 140/90 Yes   Dilated retinal exam Most Recent Eye Exam Date: Not Found Annually No   Foot exam   Most Recent Foot Exam Date: Not Found Annually No              Current Assessment & Plan     Chronic. Stable. Off Glipizide. Doing well on Ozempic. A1C improved from previous. Repeat labs today. We will plan to monitor hemoglobin A1c at designated intervals 3 to 6 months.  I recommend ongoing Education for diabetic diet and exercise protocol.  We will continue to monitor for side effects.    Please be advised of symptoms to monitor for and to notify me immediately if persistent or worsening.  Follow up with Ophthalmology/Optometry and Podiatry  at least annually.    Diabetes Medications               JANUVIA 50 mg Tab Take 1 tablet (50 mg total) by mouth once daily.    metFORMIN (GLUCOPHAGE) 1000 MG tablet Take 1,000 mg by mouth 2 (two) times daily.    semaglutide (OZEMPIC) 0.25 mg or 0.5 mg(2 mg/1.5 mL) pen injector Inject 0.5 mg into the skin once a week.          Lab Results   Component Value Date    HGBA1C 7.5 (H) 09/19/2022               Other    Encounter for long-term (current) use of medications (Chronic)    Overview     CHRONIC. Stable. Compliant with medications for managed conditions. See medication list. No SE reported.   Routine lab analysis is being monitored. Refills were addressed.  Lab Results   Component Value Date    WBC 6.17 09/19/2022    HGB 9.3 (L) 09/19/2022    HCT 29.0 (L) 09/19/2022    MCV 83 09/19/2022     09/19/2022       Chemistry        Component Value Date/Time     09/19/2022 0633    K 3.4 (L) 09/19/2022 0633     09/19/2022 0633    CO2 26 09/19/2022 0633    BUN 17 09/19/2022 0633    CREATININE 1.10 09/19/2022 0633     09/19/2022 0633        Component Value Date/Time    CALCIUM 8.3 (L) 09/19/2022 0633    ALKPHOS 78 09/18/2022 0935    AST 29 09/18/2022 0935    ALT 12 09/18/2022 0935    BILITOT 0.8 09/18/2022 0935    ESTGFRAFRICA 39.0 (A) 07/22/2022 0917    ESTGFRAFRICA 39.0 (A) 07/22/2022 0917    EGFRNONAA 33.8 (A) 07/22/2022 0917    EGFRNONAA 33.8 (A) 07/22/2022 0917        No results found for: TSH, C3XDYVX, W6BSSPV, THYROIDAB, FREET4, T3FREE         Current Assessment & Plan     Update labs today.Complete history and physical was completed today.  Complete and thorough medication reconciliation was performed.  Discussed risks and benefits of medications.  Advised patient on orders and health maintenance.  We discussed old records and old labs if available.  Will request any records not available through epic.  Continue current medications listed on your summary sheet.            Follow up in about 6  months (around 10/20/2023), or if symptoms worsen or fail to improve.  ER precautions for severe or worsening symptoms.     Edna Wallace, NP  _____________________________________________________________________________________________________________________________________________________    CC: follow up     HPI: Patient is a 77-year-old female who presents in clinic today as an established patient here for follow up. Chronic condition has been reviewed and remains stable. Further details as stated above.     HTN: The patient is currently being treated for essential hypertension. This condition is chronic and stable. The patient is tolerating their medication well with good compliance.  Denies any adverse effects of medications.  Counseling was offered regarding low sodium diet.  The patient has a reduced salt intake. Routine exercise recommended. The patient denies headache, vision changes, chest pain, palpitations, shortness of breath, or lower extremity edema.    Hyperlipidemia: This is a chronic problem. The current episode started more than 1 year ago. The problem is controlled. Recent lipid tests were reviewed and are variable. Pertinent negatives include no chest pain or shortness of breath. Current antihyperlipidemic treatment includes statins. The current treatment provides moderate improvement of lipids. There are no compliance problems.      DM2: Patient presents for follow up of diabetes. Condition is chronic and stable. Patient denies symptoms, including foot ulcerations, hyperglycemia, hypoglycemia , nausea, paresthesia of the feet, polydipsia, polyuria and visual disturbances.  Evaluation to date has been included: fasting blood sugar, fasting lipid panel, hemoglobin A1C and microalbuminuria. Denies adverse effects of current medications.     Diabetes Management Status    Statin: Taking  ACE/ARB: Taking    Screening or Prevention Patient's value Goal Complete/Controlled?   HgA1C Testing and  Control   Lab Results   Component Value Date    HGBA1C 7.5 (H) 09/19/2022      Annually/Less than 8% Yes     Lipid profile : 07/22/2022 Annually Yes     LDL control Lab Results   Component Value Date    LDLCALC 90.2 07/22/2022    Annually/Less than 100 mg/dl  Yes     Nephropathy screening Lab Results   Component Value Date    LABMICR 55.0 07/22/2022     Lab Results   Component Value Date    PROTEINUA Negative 01/09/2023    Annually Yes     Blood pressure BP Readings from Last 1 Encounters:   04/20/23 130/82    Less than 140/90 Yes     Dilated retinal exam Most Recent Eye Exam Date: Not Found Annually No     Foot exam   Most Recent Foot Exam Date: Not Found Annually Yes       Past Medical History:  Past Medical History:   Diagnosis Date    Allergy     Cataract     Dementia     Depression     Diabetes mellitus, type 2     Heart murmur     Hypertension     Stroke      Past Surgical History:   Procedure Laterality Date    EYE SURGERY      HIP REPLACEMENT ARTHROPLASTY      HYSTERECTOMY       Review of patient's allergies indicates:   Allergen Reactions    Morphine Anxiety and Other (See Comments)     Dizziness     Social History     Tobacco Use    Smoking status: Never    Smokeless tobacco: Never   Substance Use Topics    Alcohol use: Never    Drug use: Never     Family History   Problem Relation Age of Onset    Cancer Mother     Hypertension Mother     Cancer Sister      Current Outpatient Medications on File Prior to Visit   Medication Sig Dispense Refill    ACCU-CHEK AMELIA PLUS METER Misc Use 1 device  three times a day 1 each 0    ACCU-CHEK AMELIA PLUS TEST STRP Strp Inject 1 strip into the skin 3 (three) times daily. 100 each 3    aspirin (ECOTRIN) 81 MG EC tablet Take 1 tablet (81 mg total) by mouth once. for 1 dose 30 tablet 3    atorvastatin (LIPITOR) 80 MG tablet Take 1 tablet (80 mg total) by mouth once daily. 90 tablet 4    carvediloL (COREG) 3.125 MG tablet TAKE 1 TABLET BY MOUTH EVERY MORNING and ONE IN THE  EVENING WITH A MEAL      cefdinir (OMNICEF) 300 MG capsule Take by mouth.      cholecalciferol, vitamin D3, (VITAMIN D3) 25 mcg (1,000 unit) capsule Take 1 capsule (1,000 Units total) by mouth once daily. 90 capsule 3    ciprofloxacin HCl (CIPRO) 500 MG tablet Take by mouth.      diclofenac sodium (VOLTAREN) 1 % Gel Apply topically.      donepezil HCl (DONEPEZIL ORAL) donepezil Take No date recorded No form recorded No frequency recorded No route recorded No set duration recorded No set duration amount recorded active No dosage strength recorded No dosage strength units of measure recorded      estradioL (ESTRACE) 0.01 % (0.1 mg/gram) vaginal cream Place 1 g vaginally 3 (three) times a week.      gabapentin (NEURONTIN) 100 MG capsule Take 200 mg by mouth nightly.      irbesartan-hydrochlorothiazide (AVALIDE) 300-12.5 mg per tablet irbesartan 300 mg-hydrochlorothiazide 12.5 mg tablet   TAKE 1 TABLET BY MOUTH EVERY DAY      isosorbide mononitrate (IMDUR) 30 MG 24 hr tablet Take 1 tablet (30 mg total) by mouth once daily. 30 tablet 2    JANUVIA 50 mg Tab Take 1 tablet (50 mg total) by mouth once daily. 90 tablet 4    LANCETS, SUPER THIN Misc TEST BLOOD SUGAR THREE TIMES DAILY AS DIRECTED 100 each 3    LIDOcaine (LIDODERM) 5 % SMARTSIG:Topical      metFORMIN (GLUCOPHAGE) 1000 MG tablet Take 1,000 mg by mouth 2 (two) times daily.      nitrofurantoin, macrocrystal-monohydrate, (MACROBID) 100 MG capsule Take 1 capsule (100 mg total) by mouth 2 (two) times daily. 20 capsule 0    ondansetron (ZOFRAN) 4 MG tablet Take 1 tablet (4 mg total) by mouth every 8 (eight) hours as needed for Nausea. 30 tablet 1    semaglutide (OZEMPIC) 0.25 mg or 0.5 mg(2 mg/1.5 mL) pen injector Inject 0.5 mg into the skin once a week. 3 pen 4    TOVIAZ 4 mg Tb24 Take 4 mg by mouth once daily.       No current facility-administered medications on file prior to visit.     Review of Systems   Constitutional:  Positive for fatigue. Negative for appetite  "change, chills and fever.   HENT:  Negative for congestion, rhinorrhea and sore throat.    Eyes:  Negative for visual disturbance.   Respiratory:  Negative for cough and shortness of breath.    Cardiovascular:  Negative for chest pain, palpitations and leg swelling.   Gastrointestinal:  Negative for abdominal pain, diarrhea and vomiting.   Genitourinary:  Negative for difficulty urinating, dysuria, flank pain, frequency, hematuria and urgency.   Musculoskeletal:  Positive for arthralgias and gait problem. Negative for myalgias.   Skin:  Negative for rash and wound.   Neurological:  Positive for weakness. Negative for dizziness and headaches.   Psychiatric/Behavioral:  Negative for behavioral problems. The patient is not nervous/anxious.      Vitals:    04/20/23 0732   BP: 130/82   BP Location: Right arm   Pulse: 89   SpO2: 96%   Weight: 58.5 kg (129 lb)   Height: 5' 9" (1.753 m)     Wt Readings from Last 3 Encounters:   04/20/23 58.5 kg (129 lb)   04/11/23 54 kg (119 lb)   03/09/23 54.3 kg (119 lb 11.4 oz)     Physical Exam  Vitals reviewed.   Constitutional:       General: She is not in acute distress.     Appearance: Normal appearance. She is not ill-appearing.      Comments: thin   HENT:      Head: Normocephalic and atraumatic.      Right Ear: External ear normal.      Left Ear: External ear normal.      Nose: Nose normal.   Eyes:      Extraocular Movements: Extraocular movements intact.      Conjunctiva/sclera: Conjunctivae normal.      Pupils: Pupils are equal, round, and reactive to light.   Cardiovascular:      Rate and Rhythm: Normal rate.      Pulses: Normal pulses.      Heart sounds: Normal heart sounds.   Pulmonary:      Effort: Pulmonary effort is normal. No respiratory distress.      Breath sounds: Normal breath sounds.   Abdominal:      General: Bowel sounds are normal.      Palpations: Abdomen is soft.   Musculoskeletal:         General: Normal range of motion.      Cervical back: Normal range of " motion.      Right lower leg: No edema.      Left lower leg: No edema.      Comments: Impaired muscle strength and balance   Skin:     General: Skin is warm and dry.      Coloration: Skin is not pale.      Findings: No rash.   Neurological:      Mental Status: She is alert. Mental status is at baseline. She is confused.      Motor: Weakness present.      Gait: Gait abnormal (using wheelchair).      Comments: Oriented to person and place only. Remains confused. At baseline per    Psychiatric:         Attention and Perception: She is inattentive.         Speech: Speech is delayed. Speech is not rapid and pressured or slurred.         Behavior: Behavior is not slowed, withdrawn, hyperactive or combative. Behavior is cooperative.         Cognition and Memory: Cognition is impaired. Memory is impaired.         Judgment: Judgment is impulsive and inappropriate.     Health Maintenance   Topic Date Due    Hepatitis C Screening  Never done    Eye Exam  Never done    DEXA Scan  Never done    Hemoglobin A1c  03/19/2023    Lipid Panel  07/22/2023    TETANUS VACCINE  08/08/2027

## 2023-04-20 NOTE — ASSESSMENT & PLAN NOTE
Continue statin. Counseled on hyperlipidemia disease course, healthy diet and increased need for exercise. Please be advised of the risk of cardiovascular disease, increase stroke and heart attack risk with uncontrolled/untreated hyperlipidemia. Patient voiced understanding and understood the treatment plan. All questions were answered.

## 2023-04-20 NOTE — ASSESSMENT & PLAN NOTE
Chronic. Stable. Off Glipizide. Doing well on Ozempic. A1C improved from previous. Repeat labs today. We will plan to monitor hemoglobin A1c at designated intervals 3 to 6 months.  I recommend ongoing Education for diabetic diet and exercise protocol.  We will continue to monitor for side effects.    Please be advised of symptoms to monitor for and to notify me immediately if persistent or worsening.  Follow up with Ophthalmology/Optometry and Podiatry at least annually.    Diabetes Medications             JANUVIA 50 mg Tab Take 1 tablet (50 mg total) by mouth once daily.    metFORMIN (GLUCOPHAGE) 1000 MG tablet Take 1,000 mg by mouth 2 (two) times daily.    semaglutide (OZEMPIC) 0.25 mg or 0.5 mg(2 mg/1.5 mL) pen injector Inject 0.5 mg into the skin once a week.        Lab Results   Component Value Date    HGBA1C 7.5 (H) 09/19/2022

## 2023-04-20 NOTE — ASSESSMENT & PLAN NOTE
BP at goal today. Compliant with medications. Follow-up with Cardiology and Nephrology.  Counseled on importance of hypertension disease course, I recommend ongoing Education for DASH-diet and exercise. Counseled on medication regimen importance of treating high blood pressure. Please be advised of risk of untreated blood pressure as discussed. Please advised of ER precautions were given for symptoms of hypertensive urgency and emergency.  Hypertension Medications             carvediloL (COREG) 3.125 MG tablet TAKE 1 TABLET BY MOUTH EVERY MORNING and ONE IN THE EVENING WITH A MEAL    irbesartan-hydrochlorothiazide (AVALIDE) 300-12.5 mg per tablet irbesartan 300 mg-hydrochlorothiazide 12.5 mg tablet   TAKE 1 TABLET BY MOUTH EVERY DAY    isosorbide mononitrate (IMDUR) 30 MG 24 hr tablet Take 1 tablet (30 mg total) by mouth once daily.

## 2023-04-22 NOTE — PROGRESS NOTES
Make follow-up lab appointment per recommendation below.  Check to see if patient has seen the results through my chart.  If not then,  #CALL THE PATIENT# to discuss results/see if they have questions and document verification of contact. Make F/U appt if needed. [phone]    #My interpretation that was sent to them through "[employer]":  Marianne, I have reviewed your recent blood work.     Your complete blood count is stable.  Chronic anemia.  I recommend further evaluation with folate, iron, B12 levels.  Your metabolic panel which shows your glucose, kidney function, electrolytes, and liver function is stable.  Follow-up with Nephrology.  Your cholesterol is stable.    Your hemoglobin A1c is improved.  This test is gold standard screening test for diabetes.  It is a measures 3 months of your average blood sugar.  =========================  Also please address any outstanding health maintenance that may be due: Hepatitis C Screening Never done  Pneumococcal Vaccines (Age 65+)(1 - PCV) Never done  Eye Exam Never done  DEXA Scan Never done  Shingles Vaccine(1 of 2) Never done  COVID-19 Vaccine(5 - Booster for Moderna series) due on 01/12/2022  Influenza Vaccine(1) due on 09/01/2022

## 2023-04-26 ENCOUNTER — LAB VISIT (OUTPATIENT)
Dept: LAB | Facility: HOSPITAL | Age: 78
End: 2023-04-26
Attending: INTERNAL MEDICINE
Payer: MEDICARE

## 2023-04-26 DIAGNOSIS — D63.1 ANEMIA IN STAGE 3A CHRONIC KIDNEY DISEASE: ICD-10-CM

## 2023-04-26 DIAGNOSIS — N18.31 ANEMIA IN STAGE 3A CHRONIC KIDNEY DISEASE: ICD-10-CM

## 2023-04-26 DIAGNOSIS — N18.31 STAGE 3A CHRONIC KIDNEY DISEASE: ICD-10-CM

## 2023-04-26 LAB
25(OH)D3+25(OH)D2 SERPL-MCNC: 36 NG/ML (ref 30–96)
ALBUMIN SERPL BCP-MCNC: 3.3 G/DL (ref 3.5–5.2)
ANION GAP SERPL CALC-SCNC: 9 MMOL/L (ref 8–16)
BASOPHILS # BLD AUTO: 0.03 K/UL (ref 0–0.2)
BASOPHILS NFR BLD: 0.6 % (ref 0–1.9)
BUN SERPL-MCNC: 34 MG/DL (ref 8–23)
CALCIUM SERPL-MCNC: 9.2 MG/DL (ref 8.7–10.5)
CHLORIDE SERPL-SCNC: 110 MMOL/L (ref 95–110)
CO2 SERPL-SCNC: 23 MMOL/L (ref 23–29)
CREAT SERPL-MCNC: 1.3 MG/DL (ref 0.5–1.4)
CREAT UR-MCNC: 223 MG/DL (ref 15–325)
DIFFERENTIAL METHOD: ABNORMAL
EOSINOPHIL # BLD AUTO: 0.2 K/UL (ref 0–0.5)
EOSINOPHIL NFR BLD: 3.7 % (ref 0–8)
ERYTHROCYTE [DISTWIDTH] IN BLOOD BY AUTOMATED COUNT: 15.8 % (ref 11.5–14.5)
EST. GFR  (NO RACE VARIABLE): 42.6 ML/MIN/1.73 M^2
GLUCOSE SERPL-MCNC: 109 MG/DL (ref 70–110)
HCT VFR BLD AUTO: 33.7 % (ref 37–48.5)
HGB BLD-MCNC: 10.1 G/DL (ref 12–16)
IMM GRANULOCYTES # BLD AUTO: 0.01 K/UL (ref 0–0.04)
IMM GRANULOCYTES NFR BLD AUTO: 0.2 % (ref 0–0.5)
IRON SERPL-MCNC: 37 UG/DL (ref 30–160)
LYMPHOCYTES # BLD AUTO: 2.1 K/UL (ref 1–4.8)
LYMPHOCYTES NFR BLD: 38.9 % (ref 18–48)
MCH RBC QN AUTO: 27.2 PG (ref 27–31)
MCHC RBC AUTO-ENTMCNC: 30 G/DL (ref 32–36)
MCV RBC AUTO: 91 FL (ref 82–98)
MONOCYTES # BLD AUTO: 0.6 K/UL (ref 0.3–1)
MONOCYTES NFR BLD: 10.3 % (ref 4–15)
NEUTROPHILS # BLD AUTO: 2.5 K/UL (ref 1.8–7.7)
NEUTROPHILS NFR BLD: 46.3 % (ref 38–73)
NRBC BLD-RTO: 0 /100 WBC
PHOSPHATE SERPL-MCNC: 3.6 MG/DL (ref 2.7–4.5)
PLATELET # BLD AUTO: 185 K/UL (ref 150–450)
PMV BLD AUTO: 11 FL (ref 9.2–12.9)
POTASSIUM SERPL-SCNC: 3.7 MMOL/L (ref 3.5–5.1)
PROT UR-MCNC: 27 MG/DL (ref 0–15)
PROT/CREAT UR: 0.12 MG/G{CREAT} (ref 0–0.2)
RBC # BLD AUTO: 3.71 M/UL (ref 4–5.4)
SATURATED IRON: 14 % (ref 20–50)
SODIUM SERPL-SCNC: 142 MMOL/L (ref 136–145)
TOTAL IRON BINDING CAPACITY: 258 UG/DL (ref 250–450)
TRANSFERRIN SERPL-MCNC: 174 MG/DL (ref 200–375)
URATE SERPL-MCNC: 7.2 MG/DL (ref 2.4–5.7)
WBC # BLD AUTO: 5.42 K/UL (ref 3.9–12.7)

## 2023-04-26 PROCEDURE — 36415 COLL VENOUS BLD VENIPUNCTURE: CPT | Mod: PO | Performed by: INTERNAL MEDICINE

## 2023-04-26 PROCEDURE — 83970 ASSAY OF PARATHORMONE: CPT | Performed by: INTERNAL MEDICINE

## 2023-04-26 PROCEDURE — 82570 ASSAY OF URINE CREATININE: CPT | Performed by: INTERNAL MEDICINE

## 2023-04-26 PROCEDURE — 82728 ASSAY OF FERRITIN: CPT | Performed by: INTERNAL MEDICINE

## 2023-04-26 PROCEDURE — 84550 ASSAY OF BLOOD/URIC ACID: CPT | Performed by: INTERNAL MEDICINE

## 2023-04-26 PROCEDURE — 84466 ASSAY OF TRANSFERRIN: CPT | Performed by: INTERNAL MEDICINE

## 2023-04-26 PROCEDURE — 85025 COMPLETE CBC W/AUTO DIFF WBC: CPT | Performed by: INTERNAL MEDICINE

## 2023-04-26 PROCEDURE — 80069 RENAL FUNCTION PANEL: CPT | Performed by: INTERNAL MEDICINE

## 2023-04-26 PROCEDURE — 81001 URINALYSIS AUTO W/SCOPE: CPT | Performed by: INTERNAL MEDICINE

## 2023-04-26 PROCEDURE — 82306 VITAMIN D 25 HYDROXY: CPT | Performed by: INTERNAL MEDICINE

## 2023-04-27 LAB
BACTERIA #/AREA URNS AUTO: ABNORMAL /HPF
BILIRUB UR QL STRIP: NEGATIVE
CLARITY UR REFRACT.AUTO: CLEAR
COLOR UR AUTO: YELLOW
FERRITIN SERPL-MCNC: 493 NG/ML (ref 20–300)
GLUCOSE UR QL STRIP: NEGATIVE
HGB UR QL STRIP: NEGATIVE
HYALINE CASTS UR QL AUTO: 9 /LPF
KETONES UR QL STRIP: NEGATIVE
LEUKOCYTE ESTERASE UR QL STRIP: ABNORMAL
MICROSCOPIC COMMENT: ABNORMAL
NITRITE UR QL STRIP: NEGATIVE
NON-SQ EPI CELLS #/AREA URNS AUTO: 2 /HPF
PH UR STRIP: 5 [PH] (ref 5–8)
PROT UR QL STRIP: ABNORMAL
PTH-INTACT SERPL-MCNC: 82.5 PG/ML (ref 9–77)
RBC #/AREA URNS AUTO: 1 /HPF (ref 0–4)
SP GR UR STRIP: 1.02 (ref 1–1.03)
SQUAMOUS #/AREA URNS AUTO: 6 /HPF
URN SPEC COLLECT METH UR: ABNORMAL
WBC #/AREA URNS AUTO: 7 /HPF (ref 0–5)

## 2023-05-01 ENCOUNTER — TELEPHONE (OUTPATIENT)
Dept: FAMILY MEDICINE | Facility: CLINIC | Age: 78
End: 2023-05-01
Payer: MEDICARE

## 2023-05-01 NOTE — TELEPHONE ENCOUNTER
----- Message from Krista Baugh sent at 5/1/2023  4:47 PM CDT -----  Contact: Shireen/Daughter  Type:  Patient Returning Call    Who Called:Shireen  Who Left Message for Patient:Nurse  Does the patient know what this is regarding?:missed call  Would the patient rather a call back or a response via [facility]? call  Best Call Back Number:[phone]  Additional Information:

## 2023-05-03 RX ORDER — FESOTERODINE FUMARATE 4 MG/1
TABLET, EXTENDED RELEASE ORAL
Qty: 30 TABLET | Refills: 1 | Status: SHIPPED | OUTPATIENT
Start: 2023-05-03 | End: 2023-06-13

## 2023-05-03 NOTE — TELEPHONE ENCOUNTER
Care Due:                  Date            Visit Type   Department     Provider  --------------------------------------------------------------------------------                                EP -                              PRIMARY      [facility] FAMILY  Last Visit: 07-      CARE ([facility])   MEDICINE       Antoni Patel                               -                              PRIMARY      [facility] FAMILY  Next Visit: 08-      CARE ([facility])   MEDICINE       Antoni Patel                                                            Last  Test          Frequency    Reason                     Performed    Due Date  --------------------------------------------------------------------------------    Office Visit  12 months..  JANUVIA, atorvastatin,     07- 07-                             isosorbide, semaglutide..    [facility] Embedded Care Due Messages. Reference number: [id number].   5/03/2023 2:11:36 PM CDT

## 2023-05-15 ENCOUNTER — OFFICE VISIT (OUTPATIENT)
Dept: NEPHROLOGY | Facility: CLINIC | Age: 78
End: 2023-05-15
Payer: MEDICARE

## 2023-05-15 VITALS
OXYGEN SATURATION: 98 % | DIASTOLIC BLOOD PRESSURE: 84 MMHG | SYSTOLIC BLOOD PRESSURE: 180 MMHG | HEIGHT: 69 IN | WEIGHT: 129 LBS | BODY MASS INDEX: 19.11 KG/M2 | HEART RATE: 72 BPM

## 2023-05-15 DIAGNOSIS — E11.22 TYPE 2 DIABETES MELLITUS WITH STAGE 3A CHRONIC KIDNEY DISEASE, WITHOUT LONG-TERM CURRENT USE OF INSULIN: ICD-10-CM

## 2023-05-15 DIAGNOSIS — E44.0 PROTEIN-CALORIE MALNUTRITION, MODERATE: ICD-10-CM

## 2023-05-15 DIAGNOSIS — I15.2 HYPERTENSION ASSOCIATED WITH DIABETES: Primary | Chronic | ICD-10-CM

## 2023-05-15 DIAGNOSIS — N25.81 SECONDARY HYPERPARATHYROIDISM OF RENAL ORIGIN: ICD-10-CM

## 2023-05-15 DIAGNOSIS — N18.31 STAGE 3A CHRONIC KIDNEY DISEASE: Chronic | ICD-10-CM

## 2023-05-15 DIAGNOSIS — N18.31 TYPE 2 DIABETES MELLITUS WITH STAGE 3A CHRONIC KIDNEY DISEASE, WITHOUT LONG-TERM CURRENT USE OF INSULIN: ICD-10-CM

## 2023-05-15 DIAGNOSIS — I25.118 CORONARY ARTERY DISEASE INVOLVING NATIVE CORONARY ARTERY OF NATIVE HEART WITH OTHER FORM OF ANGINA PECTORIS: ICD-10-CM

## 2023-05-15 DIAGNOSIS — I50.32 CHRONIC DIASTOLIC CONGESTIVE HEART FAILURE: ICD-10-CM

## 2023-05-15 DIAGNOSIS — E11.59 HYPERTENSION ASSOCIATED WITH DIABETES: Primary | Chronic | ICD-10-CM

## 2023-05-15 PROBLEM — N17.9 ACUTE RENAL FAILURE: Status: RESOLVED | Noted: 2022-03-19 | Resolved: 2023-05-15

## 2023-05-15 PROCEDURE — 1101F PR PT FALLS ASSESS DOC 0-1 FALLS W/OUT INJ PAST YR: ICD-10-PCS | Mod: CPTII,S$GLB,, | Performed by: INTERNAL MEDICINE

## 2023-05-15 PROCEDURE — 3288F FALL RISK ASSESSMENT DOCD: CPT | Mod: CPTII,S$GLB,, | Performed by: INTERNAL MEDICINE

## 2023-05-15 PROCEDURE — 1160F PR REVIEW ALL MEDS BY PRESCRIBER/CLIN PHARMACIST DOCUMENTED: ICD-10-PCS | Mod: CPTII,S$GLB,, | Performed by: INTERNAL MEDICINE

## 2023-05-15 PROCEDURE — 3288F PR FALLS RISK ASSESSMENT DOCUMENTED: ICD-10-PCS | Mod: CPTII,S$GLB,, | Performed by: INTERNAL MEDICINE

## 2023-05-15 PROCEDURE — 99214 PR OFFICE/OUTPT VISIT, EST, LEVL IV, 30-39 MIN: ICD-10-PCS | Mod: S$GLB,,, | Performed by: INTERNAL MEDICINE

## 2023-05-15 PROCEDURE — 3077F SYST BP >= 140 MM HG: CPT | Mod: CPTII,S$GLB,, | Performed by: INTERNAL MEDICINE

## 2023-05-15 PROCEDURE — 99999 PR PBB SHADOW E&M-EST. PATIENT-LVL IV: CPT | Mod: PBBFAC,,, | Performed by: INTERNAL MEDICINE

## 2023-05-15 PROCEDURE — 99999 PR PBB SHADOW E&M-EST. PATIENT-LVL IV: ICD-10-PCS | Mod: PBBFAC,,, | Performed by: INTERNAL MEDICINE

## 2023-05-15 PROCEDURE — 1101F PT FALLS ASSESS-DOCD LE1/YR: CPT | Mod: CPTII,S$GLB,, | Performed by: INTERNAL MEDICINE

## 2023-05-15 PROCEDURE — 3077F PR MOST RECENT SYSTOLIC BLOOD PRESSURE >= 140 MM HG: ICD-10-PCS | Mod: CPTII,S$GLB,, | Performed by: INTERNAL MEDICINE

## 2023-05-15 PROCEDURE — 3079F DIAST BP 80-89 MM HG: CPT | Mod: CPTII,S$GLB,, | Performed by: INTERNAL MEDICINE

## 2023-05-15 PROCEDURE — 3079F PR MOST RECENT DIASTOLIC BLOOD PRESSURE 80-89 MM HG: ICD-10-PCS | Mod: CPTII,S$GLB,, | Performed by: INTERNAL MEDICINE

## 2023-05-15 PROCEDURE — 1160F RVW MEDS BY RX/DR IN RCRD: CPT | Mod: CPTII,S$GLB,, | Performed by: INTERNAL MEDICINE

## 2023-05-15 PROCEDURE — 99214 OFFICE O/P EST MOD 30 MIN: CPT | Mod: S$GLB,,, | Performed by: INTERNAL MEDICINE

## 2023-05-15 PROCEDURE — 1159F PR MEDICATION LIST DOCUMENTED IN MEDICAL RECORD: ICD-10-PCS | Mod: CPTII,S$GLB,, | Performed by: INTERNAL MEDICINE

## 2023-05-15 PROCEDURE — 1159F MED LIST DOCD IN RCRD: CPT | Mod: CPTII,S$GLB,, | Performed by: INTERNAL MEDICINE

## 2023-05-15 RX ORDER — VIT C/E/ZN/COPPR/LUTEIN/ZEAXAN 250MG-90MG
1000 CAPSULE ORAL DAILY
Qty: 90 CAPSULE | Refills: 3 | Status: SHIPPED | OUTPATIENT
Start: 2023-05-15 | End: 2024-05-14

## 2023-05-15 NOTE — PROGRESS NOTES
"Subjective:       Patient ID: Marianne Cali is a 77 y.o. Black or  female who presents for follow up on CKD.     Since last seen at nephrology clinic, Marianne Cali saw urology for re-current cystitis.  No uremic symptoms, not volume overloaded.    Reports taking their medications on time, without missed doses. As to their chronic conditions:  - CKD: Denies use of NSAIDs.   2022: baseline sr cr of 1 - 1.5 mg/dL with no proteinuria, Renal US R 10.4 cm and L 10.9 cm  - Dmt2: pt reports good glycemic control.  - HTN: well controlled, follows low salt diet. Denies uncontrolled HTN, dizziness/lightheadedness or hypotension/syncopal episodes.    Review of Systems   Constitutional:  Negative for chills, fatigue and fever.   HENT:  Negative for hearing loss, trouble swallowing and voice change.    Respiratory:  Negative for cough, shortness of breath and wheezing.    Cardiovascular:  Negative for chest pain, palpitations and leg swelling.   Gastrointestinal:  Negative for abdominal distention, abdominal pain, constipation and diarrhea.   Endocrine: Negative for polydipsia, polyphagia and polyuria.   Genitourinary:  Negative for dysuria, flank pain, frequency and hematuria.   Musculoskeletal:  Positive for arthralgias, gait problem and joint swelling. Negative for back pain and myalgias.   Skin:  Negative for rash and wound.   Neurological:  Negative for dizziness, syncope, weakness and light-headedness.   Hematological:  Negative for adenopathy. Does not bruise/bleed easily.     The past medical, family and social histories were reviewed for this encounter.     BP (!) 180/84 (BP Location: Left arm, Patient Position: Sitting, BP Method: Medium (Manual))   Pulse 72   Ht 5' 9" (1.753 m)   Wt 58.5 kg (128 lb 15.5 oz)   SpO2 98%   BMI 19.05 kg/m²     Objective:      Physical Exam  Vitals and nursing note reviewed.   Constitutional:       Appearance: Normal appearance. She is not ill-appearing.   HENT:      " Head: Normocephalic and atraumatic.      Mouth/Throat:      Mouth: Mucous membranes are moist.      Pharynx: Oropharynx is clear.   Eyes:      Extraocular Movements: Extraocular movements intact.      Conjunctiva/sclera: Conjunctivae normal.   Neck:      Vascular: No carotid bruit.   Cardiovascular:      Rate and Rhythm: Normal rate and regular rhythm.      Heart sounds: Normal heart sounds.   Pulmonary:      Effort: Pulmonary effort is normal. No respiratory distress.      Breath sounds: Normal breath sounds.   Abdominal:      General: Bowel sounds are normal. There is no distension.      Palpations: Abdomen is soft.      Tenderness: There is no abdominal tenderness.   Musculoskeletal:         General: Normal range of motion.      Cervical back: Normal range of motion. No tenderness.   Lymphadenopathy:      Cervical: No cervical adenopathy.   Skin:     General: Skin is warm and dry.      Capillary Refill: Capillary refill takes less than 2 seconds.      Coloration: Skin is not jaundiced.   Neurological:      General: No focal deficit present.      Mental Status: She is alert. She is disoriented.   Psychiatric:         Mood and Affect: Mood normal.         Behavior: Behavior normal.         Judgment: Judgment normal.       Assessment:       1. Hypertension associated with diabetes    2. Coronary artery disease involving native coronary artery of native heart with other form of angina pectoris    3. Chronic diastolic congestive heart failure    4. Stage 3a chronic kidney disease    5. Type 2 diabetes mellitus with stage 3a chronic kidney disease, without long-term current use of insulin    6. Secondary hyperparathyroidism of renal origin    7. Protein-calorie malnutrition, moderate          Plan:   Return to clinic in 6 months    CKD stage G3aA1 in a setting of DM and HTN  Baseline creatinine is 1.1 - 1.3 mg/dL and no proteinuria  Lab Results   Component Value Date    CREATININE 1.3 04/26/2023      - Euvolemic   - Pt  understands importance of blood pressure and glycemic control and is aware that uncontrolled HTN and DM leads to progression of CKD   - Complete avoidance of NSAIDs   - Low salt diet with < 2000 mg/day   - Dose adjust medications to GFR of 45-60   - Avoid nephrotoxins including IV contrast    HTN   - BP well controlled: continue current medications, avoid hypotension and dehydration    DM   - DM without diabetic retinopathy: mod controlled with most recent HgbA1c of 6.3%, continue yearly optho checks    SHPT  Lab Results   Component Value Date    PTH 82.5 (H) 04/26/2023    CALCIUM 9.2 04/26/2023    PHOS 3.6 04/26/2023    - Start Vit D    CAD   - Ok to take ASA 81 mg     Anemia   - Stable    HF   - not on lasix   - Salt restriction    Med changes: none

## 2023-05-25 ENCOUNTER — EXTERNAL HOME HEALTH (OUTPATIENT)
Dept: HOME HEALTH SERVICES | Facility: HOSPITAL | Age: 78
End: 2023-05-25
Payer: MEDICARE

## 2023-06-12 RX ORDER — SITAGLIPTIN 100 MG/1
TABLET, FILM COATED ORAL
Qty: 30 TABLET | Refills: 2 | OUTPATIENT
Start: 2023-06-12

## 2023-06-12 NOTE — TELEPHONE ENCOUNTER
No care due was identified.  [facility] Embedded Care Due Messages. Reference number: [id number].   6/12/2023 4:51:27 PM CDT

## 2023-06-13 RX ORDER — FESOTERODINE FUMARATE 4 MG/1
TABLET, EXTENDED RELEASE ORAL
Qty: 30 TABLET | Refills: 1 | Status: SHIPPED | OUTPATIENT
Start: 2023-06-13 | End: 2024-01-19

## 2023-06-13 NOTE — TELEPHONE ENCOUNTER
Refill Routing Note   Refill Routing Note   Medication(s) are not appropriate for processing by [facility] for the following reason(s):      Patient seen in ED/Hospital since LOV with PCP  New or recently adjusted medication    [facility] action(s):  Defer  Quick Discontinue None identified     Medication Therapy Plan: Patient is on Januvia 50mg.  Medication reconciliation completed: No     Appointments  past 12m or future 3m with PCP    Date Provider   Last Visit   7/19/2022 Antoni Patel MD   Next Visit   8/18/2023 Antoni Patel MD   ED visits in past 90 days: 0        Note composed:8:29 PM 06/12/2023

## 2023-07-06 ENCOUNTER — LAB VISIT (OUTPATIENT)
Dept: LAB | Facility: HOSPITAL | Age: 78
End: 2023-07-06
Attending: NURSE PRACTITIONER
Payer: MEDICARE

## 2023-07-06 ENCOUNTER — OFFICE VISIT (OUTPATIENT)
Dept: FAMILY MEDICINE | Facility: CLINIC | Age: 78
End: 2023-07-06
Payer: MEDICARE

## 2023-07-06 VITALS
WEIGHT: 138.81 LBS | HEART RATE: 74 BPM | BODY MASS INDEX: 20.5 KG/M2 | DIASTOLIC BLOOD PRESSURE: 72 MMHG | SYSTOLIC BLOOD PRESSURE: 127 MMHG | TEMPERATURE: 98 F | OXYGEN SATURATION: 98 %

## 2023-07-06 DIAGNOSIS — E86.0 DEHYDRATION: ICD-10-CM

## 2023-07-06 DIAGNOSIS — R07.9 CHEST PAIN, UNSPECIFIED TYPE: ICD-10-CM

## 2023-07-06 DIAGNOSIS — Z09 HOSPITAL DISCHARGE FOLLOW-UP: Primary | ICD-10-CM

## 2023-07-06 LAB
ALBUMIN SERPL BCP-MCNC: 3.1 G/DL (ref 3.5–5.2)
ANION GAP SERPL CALC-SCNC: 6 MMOL/L (ref 8–16)
BUN SERPL-MCNC: 26 MG/DL (ref 8–23)
CALCIUM SERPL-MCNC: 9.1 MG/DL (ref 8.7–10.5)
CHLORIDE SERPL-SCNC: 107 MMOL/L (ref 95–110)
CO2 SERPL-SCNC: 28 MMOL/L (ref 23–29)
CREAT SERPL-MCNC: 1 MG/DL (ref 0.5–1.4)
EST. GFR  (NO RACE VARIABLE): 58.4 ML/MIN/1.73 M^2
GLUCOSE SERPL-MCNC: 303 MG/DL (ref 70–110)
PHOSPHATE SERPL-MCNC: 3.7 MG/DL (ref 2.7–4.5)
POTASSIUM SERPL-SCNC: 3.9 MMOL/L (ref 3.5–5.1)
SODIUM SERPL-SCNC: 141 MMOL/L (ref 136–145)

## 2023-07-06 PROCEDURE — 99495 TRANSJ CARE MGMT MOD F2F 14D: CPT | Mod: S$GLB,,, | Performed by: NURSE PRACTITIONER

## 2023-07-06 PROCEDURE — 1160F PR REVIEW ALL MEDS BY PRESCRIBER/CLIN PHARMACIST DOCUMENTED: ICD-10-PCS | Mod: CPTII,S$GLB,, | Performed by: NURSE PRACTITIONER

## 2023-07-06 PROCEDURE — 3078F PR MOST RECENT DIASTOLIC BLOOD PRESSURE < 80 MM HG: ICD-10-PCS | Mod: CPTII,S$GLB,, | Performed by: NURSE PRACTITIONER

## 2023-07-06 PROCEDURE — 1100F PTFALLS ASSESS-DOCD GE2>/YR: CPT | Mod: CPTII,S$GLB,, | Performed by: NURSE PRACTITIONER

## 2023-07-06 PROCEDURE — 1159F PR MEDICATION LIST DOCUMENTED IN MEDICAL RECORD: ICD-10-PCS | Mod: CPTII,S$GLB,, | Performed by: NURSE PRACTITIONER

## 2023-07-06 PROCEDURE — 3074F SYST BP LT 130 MM HG: CPT | Mod: CPTII,S$GLB,, | Performed by: NURSE PRACTITIONER

## 2023-07-06 PROCEDURE — 80069 RENAL FUNCTION PANEL: CPT | Performed by: NURSE PRACTITIONER

## 2023-07-06 PROCEDURE — 99999 PR PBB SHADOW E&M-EST. PATIENT-LVL III: CPT | Mod: PBBFAC,,, | Performed by: NURSE PRACTITIONER

## 2023-07-06 PROCEDURE — 3078F DIAST BP <80 MM HG: CPT | Mod: CPTII,S$GLB,, | Performed by: NURSE PRACTITIONER

## 2023-07-06 PROCEDURE — 1160F RVW MEDS BY RX/DR IN RCRD: CPT | Mod: CPTII,S$GLB,, | Performed by: NURSE PRACTITIONER

## 2023-07-06 PROCEDURE — 3288F FALL RISK ASSESSMENT DOCD: CPT | Mod: CPTII,S$GLB,, | Performed by: NURSE PRACTITIONER

## 2023-07-06 PROCEDURE — 1159F MED LIST DOCD IN RCRD: CPT | Mod: CPTII,S$GLB,, | Performed by: NURSE PRACTITIONER

## 2023-07-06 PROCEDURE — 1100F PR PT FALLS ASSESS DOC 2+ FALLS/FALL W/INJURY/YR: ICD-10-PCS | Mod: CPTII,S$GLB,, | Performed by: NURSE PRACTITIONER

## 2023-07-06 PROCEDURE — 99495 TCM SERVICES (MODERATE COMPLEXITY): ICD-10-PCS | Mod: S$GLB,,, | Performed by: NURSE PRACTITIONER

## 2023-07-06 PROCEDURE — 36415 COLL VENOUS BLD VENIPUNCTURE: CPT | Mod: PO | Performed by: NURSE PRACTITIONER

## 2023-07-06 PROCEDURE — 99999 PR PBB SHADOW E&M-EST. PATIENT-LVL III: ICD-10-PCS | Mod: PBBFAC,,, | Performed by: NURSE PRACTITIONER

## 2023-07-06 PROCEDURE — 3288F PR FALLS RISK ASSESSMENT DOCUMENTED: ICD-10-PCS | Mod: CPTII,S$GLB,, | Performed by: NURSE PRACTITIONER

## 2023-07-06 PROCEDURE — 1126F AMNT PAIN NOTED NONE PRSNT: CPT | Mod: CPTII,S$GLB,, | Performed by: NURSE PRACTITIONER

## 2023-07-06 PROCEDURE — 3074F PR MOST RECENT SYSTOLIC BLOOD PRESSURE < 130 MM HG: ICD-10-PCS | Mod: CPTII,S$GLB,, | Performed by: NURSE PRACTITIONER

## 2023-07-06 PROCEDURE — 1126F PR PAIN SEVERITY QUANTIFIED, NO PAIN PRESENT: ICD-10-PCS | Mod: CPTII,S$GLB,, | Performed by: NURSE PRACTITIONER

## 2023-07-06 NOTE — PATIENT INSTRUCTIONS
Continue current medications  Increase hydration  F/U with cardiology   Report to ER immediately if symptoms worsen or persist

## 2023-07-07 NOTE — PROGRESS NOTES
"Transitional Care Note  Subjective:       Patient ID: Marianne Cali is a 77 y.o. female.  Chief Complaint: Follow-up    Family and/or Caretaker present at visit?  Yes.  Diagnostic tests reviewed/disposition: No diagnosic tests pending after this hospitalization.  Disease/illness education: Hospital f/u, Chest pain, dehydration  Home health/community services discussion/referrals: Patient does not have home health established from hospital visit.  They do not need home health.  If needed, we will set up home health for the patient.   Establishment or re-establishment of referral orders for community resources: No other necessary community resources.   Discussion with other health care providers: No discussion with other health care providers necessary.   Pt admitted to [facility] ER on 6/29/23 with c/o CP. Per ER note, associated symptoms included nausea and shortness of breath. Pt states reji she had been outside in the heat earlier that day. Initial vital signs ED included temp of 98.3 oral, heart rate 87, respirations 18, pulse ox of 99% on room air and blood pressure of 175/89; EKG showed normal sinus rhythm with minimal voltage criteria for LVH at a ventricular rate of 74; Blood work in the ED was significant for an H&H of 10.8/32.8, glucose of 158, BUN 30, creatinine 1.37 with recent creatinine of 0.86, and magnesium 1.6; CXR AP portable shows no acute process demonstrated when compared to previous. Nitropaste given in ER. Pt states resolution of symptoms since discharge on 6/30/23. Pt states has no cardiology f/u scheduled;  states, "they said they were going to call us but they haven't. I'm going to stop by today and make an appt." Pt has no other complaints today.  BMP w/ Reflex to Mg  Order: [id number]   Ref Range & Units 6 d ago   Glucose 65 - 99 mg/dL 95    Sodium 136 - 144 mmol/L 142    Potassium 3.6 - 5.1 mmol/L 3.4 Low     Chloride 101 - 111 mmol/L 109    CO2 22 - 32 mmol/L 24    Blood Urea Nitrogen 8 - " 20 mg/dL 24 High     Calcium 8.9 - 10.3 mg/dL 9.3    Creatinine 0.60 - 1.10 mg/dL 0.89    Anion Gap 7 - 16 mmol/L 9    Resulting Agency  [facility]   Specimen Collected: 06/30/23 03:37 Last Resulted: 06/30/23 04:58   Received From: [facility]  Result Received: 07/06/23 08:49     Past Medical History:   Diagnosis Date    Allergy     Cataract     Dementia     Depression     Diabetes mellitus, type 2     Heart murmur     Hypertension     Stroke      Social History     Socioeconomic History    Marital status:    Tobacco Use    Smoking status: Never    Smokeless tobacco: Never   Substance and Sexual Activity    Alcohol use: Never    Drug use: Never    Sexual activity: Never     Past Surgical History:   Procedure Laterality Date    EYE SURGERY      HIP REPLACEMENT ARTHROPLASTY      HYSTERECTOMY           HPI  Review of Systems   Constitutional: Negative.    HENT: Negative.     Eyes: Negative.    Respiratory: Negative.     Cardiovascular: Negative.    Gastrointestinal: Negative.    Endocrine: Negative.    Genitourinary: Negative.    Musculoskeletal: Negative.    Skin: Negative.    Allergic/Immunologic: Negative.    Neurological: Negative.    Psychiatric/Behavioral: Negative.       Objective:      Physical Exam  Vitals and nursing note reviewed.   Constitutional:       Appearance: Normal appearance.   HENT:      Head: Normocephalic.      Right Ear: Tympanic membrane, ear canal and external ear normal.      Left Ear: Tympanic membrane, ear canal and external ear normal.      Nose: Nose normal.      Mouth/Throat:      Mouth: Mucous membranes are moist.      Pharynx: Oropharynx is clear.   Eyes:      Conjunctiva/sclera: Conjunctivae normal.      Pupils: Pupils are equal, round, and reactive to light.   Cardiovascular:      Rate and Rhythm: Normal rate and regular rhythm.      Pulses: Normal pulses.      Heart sounds: Normal heart sounds.   Pulmonary:      Effort: Pulmonary effort is normal.      Breath  sounds: Normal breath sounds.   Abdominal:      General: Bowel sounds are normal.      Palpations: Abdomen is soft.   Musculoskeletal:         General: Normal range of motion.      Cervical back: Normal range of motion and neck supple.   Skin:     General: Skin is warm and dry.      Capillary Refill: Capillary refill takes 2 to 3 seconds.   Neurological:      Mental Status: She is alert and oriented to person, place, and time.   Psychiatric:         Mood and Affect: Mood normal.         Behavior: Behavior normal.         Thought Content: Thought content normal.         Judgment: Judgment normal.       Assessment:       1. Hospital discharge follow-up    2. Chest pain, unspecified type    3. Dehydration        Plan:           Marianne was seen today for follow-up.    Diagnoses and all orders for this visit:    Hospital discharge follow-up  Chest pain, unspecified type  Dehydration  -     RENAL FUNCTION PANEL; Future  Continue current medications  Increase hydration  F/U with cardiology   Report to ER immediately if symptoms worsen or persist

## 2023-08-21 ENCOUNTER — LAB VISIT (OUTPATIENT)
Dept: LAB | Facility: HOSPITAL | Age: 78
End: 2023-08-21
Attending: FAMILY MEDICINE
Payer: MEDICARE

## 2023-08-21 ENCOUNTER — OFFICE VISIT (OUTPATIENT)
Dept: FAMILY MEDICINE | Facility: CLINIC | Age: 78
End: 2023-08-21
Payer: MEDICARE

## 2023-08-21 VITALS
DIASTOLIC BLOOD PRESSURE: 90 MMHG | WEIGHT: 133.69 LBS | OXYGEN SATURATION: 96 % | BODY MASS INDEX: 19.8 KG/M2 | SYSTOLIC BLOOD PRESSURE: 150 MMHG | HEIGHT: 69 IN | HEART RATE: 68 BPM

## 2023-08-21 DIAGNOSIS — E11.59 HYPERTENSION ASSOCIATED WITH DIABETES: ICD-10-CM

## 2023-08-21 DIAGNOSIS — Z79.899 ENCOUNTER FOR LONG-TERM (CURRENT) USE OF MEDICATIONS: Chronic | ICD-10-CM

## 2023-08-21 DIAGNOSIS — E11.22 TYPE 2 DIABETES MELLITUS WITH STAGE 3A CHRONIC KIDNEY DISEASE, WITHOUT LONG-TERM CURRENT USE OF INSULIN: ICD-10-CM

## 2023-08-21 DIAGNOSIS — N18.31 TYPE 2 DIABETES MELLITUS WITH STAGE 3A CHRONIC KIDNEY DISEASE, WITHOUT LONG-TERM CURRENT USE OF INSULIN: ICD-10-CM

## 2023-08-21 DIAGNOSIS — I15.2 HYPERTENSION ASSOCIATED WITH DIABETES: ICD-10-CM

## 2023-08-21 DIAGNOSIS — Z09 HOSPITAL DISCHARGE FOLLOW-UP: ICD-10-CM

## 2023-08-21 DIAGNOSIS — N30.00 ACUTE CYSTITIS WITHOUT HEMATURIA: Primary | ICD-10-CM

## 2023-08-21 DIAGNOSIS — I15.2 HYPERTENSION ASSOCIATED WITH DIABETES: Primary | Chronic | ICD-10-CM

## 2023-08-21 DIAGNOSIS — Z79.899 ENCOUNTER FOR LONG-TERM (CURRENT) USE OF MEDICATIONS: ICD-10-CM

## 2023-08-21 DIAGNOSIS — N18.31 STAGE 3A CHRONIC KIDNEY DISEASE: Chronic | ICD-10-CM

## 2023-08-21 DIAGNOSIS — E11.59 HYPERTENSION ASSOCIATED WITH DIABETES: Primary | Chronic | ICD-10-CM

## 2023-08-21 DIAGNOSIS — D64.9 CHRONIC ANEMIA: ICD-10-CM

## 2023-08-21 DIAGNOSIS — R11.0 NAUSEA: ICD-10-CM

## 2023-08-21 DIAGNOSIS — N18.31 STAGE 3A CHRONIC KIDNEY DISEASE: ICD-10-CM

## 2023-08-21 DIAGNOSIS — K92.2 UPPER GI BLEED: ICD-10-CM

## 2023-08-21 DIAGNOSIS — E11.65 TYPE 2 DIABETES MELLITUS WITH HYPERGLYCEMIA, WITHOUT LONG-TERM CURRENT USE OF INSULIN: ICD-10-CM

## 2023-08-21 LAB
ALBUMIN SERPL BCP-MCNC: 3.2 G/DL (ref 3.5–5.2)
ALP SERPL-CCNC: 96 U/L (ref 55–135)
ALT SERPL W/O P-5'-P-CCNC: 12 U/L (ref 10–44)
ANION GAP SERPL CALC-SCNC: 10 MMOL/L (ref 8–16)
AST SERPL-CCNC: 18 U/L (ref 10–40)
BACTERIA #/AREA URNS HPF: ABNORMAL /HPF
BILIRUB SERPL-MCNC: 0.6 MG/DL (ref 0.1–1)
BILIRUB UR QL STRIP: NEGATIVE
BUN SERPL-MCNC: 21 MG/DL (ref 8–23)
CALCIUM SERPL-MCNC: 9.2 MG/DL (ref 8.7–10.5)
CHLORIDE SERPL-SCNC: 106 MMOL/L (ref 95–110)
CLARITY UR: CLEAR
CO2 SERPL-SCNC: 27 MMOL/L (ref 23–29)
COLOR UR: YELLOW
CREAT SERPL-MCNC: 1.1 MG/DL (ref 0.5–1.4)
EST. GFR  (NO RACE VARIABLE): 52.1 ML/MIN/1.73 M^2
ESTIMATED AVG GLUCOSE: 186 MG/DL (ref 68–131)
GLUCOSE SERPL-MCNC: 143 MG/DL (ref 70–110)
GLUCOSE UR QL STRIP: NEGATIVE
HBA1C MFR BLD: 8.1 % (ref 4–5.6)
HGB UR QL STRIP: NEGATIVE
HYALINE CASTS #/AREA URNS LPF: <1 /LPF
KETONES UR QL STRIP: NEGATIVE
LEUKOCYTE ESTERASE UR QL STRIP: ABNORMAL
MICROSCOPIC COMMENT: ABNORMAL
NITRITE UR QL STRIP: NEGATIVE
NON-SQ EPI CELLS #/AREA URNS HPF: <1 /HPF
PH UR STRIP: 5.5 [PH] (ref 5–8)
POTASSIUM SERPL-SCNC: 3.6 MMOL/L (ref 3.5–5.1)
PROT SERPL-MCNC: 8 G/DL (ref 6–8.4)
PROT UR QL STRIP: ABNORMAL
RBC #/AREA URNS HPF: 2 /HPF (ref 0–4)
SODIUM SERPL-SCNC: 143 MMOL/L (ref 136–145)
SP GR UR STRIP: 1.02 (ref 1–1.03)
SQUAMOUS #/AREA URNS HPF: 1 /HPF
URN SPEC COLLECT METH UR: ABNORMAL
WBC #/AREA URNS HPF: 25 /HPF (ref 0–5)
WBC CLUMPS URNS QL MICRO: ABNORMAL

## 2023-08-21 PROCEDURE — 99999 PR PBB SHADOW E&M-EST. PATIENT-LVL V: CPT | Mod: PBBFAC,,, | Performed by: NURSE PRACTITIONER

## 2023-08-21 PROCEDURE — 3080F DIAST BP >= 90 MM HG: CPT | Mod: CPTII,S$GLB,, | Performed by: NURSE PRACTITIONER

## 2023-08-21 PROCEDURE — 1160F PR REVIEW ALL MEDS BY PRESCRIBER/CLIN PHARMACIST DOCUMENTED: ICD-10-PCS | Mod: CPTII,S$GLB,, | Performed by: NURSE PRACTITIONER

## 2023-08-21 PROCEDURE — 3288F FALL RISK ASSESSMENT DOCD: CPT | Mod: CPTII,S$GLB,, | Performed by: NURSE PRACTITIONER

## 2023-08-21 PROCEDURE — 3080F PR MOST RECENT DIASTOLIC BLOOD PRESSURE >= 90 MM HG: ICD-10-PCS | Mod: CPTII,S$GLB,, | Performed by: NURSE PRACTITIONER

## 2023-08-21 PROCEDURE — 99999 PR PBB SHADOW E&M-EST. PATIENT-LVL V: ICD-10-PCS | Mod: PBBFAC,,, | Performed by: NURSE PRACTITIONER

## 2023-08-21 PROCEDURE — 3077F PR MOST RECENT SYSTOLIC BLOOD PRESSURE >= 140 MM HG: ICD-10-PCS | Mod: CPTII,S$GLB,, | Performed by: NURSE PRACTITIONER

## 2023-08-21 PROCEDURE — 87086 URINE CULTURE/COLONY COUNT: CPT | Performed by: FAMILY MEDICINE

## 2023-08-21 PROCEDURE — 1160F RVW MEDS BY RX/DR IN RCRD: CPT | Mod: CPTII,S$GLB,, | Performed by: NURSE PRACTITIONER

## 2023-08-21 PROCEDURE — 1159F PR MEDICATION LIST DOCUMENTED IN MEDICAL RECORD: ICD-10-PCS | Mod: CPTII,S$GLB,, | Performed by: NURSE PRACTITIONER

## 2023-08-21 PROCEDURE — 1100F PTFALLS ASSESS-DOCD GE2>/YR: CPT | Mod: CPTII,S$GLB,, | Performed by: NURSE PRACTITIONER

## 2023-08-21 PROCEDURE — 1125F AMNT PAIN NOTED PAIN PRSNT: CPT | Mod: CPTII,S$GLB,, | Performed by: NURSE PRACTITIONER

## 2023-08-21 PROCEDURE — 99214 PR OFFICE/OUTPT VISIT, EST, LEVL IV, 30-39 MIN: ICD-10-PCS | Mod: S$GLB,,, | Performed by: NURSE PRACTITIONER

## 2023-08-21 PROCEDURE — 1159F MED LIST DOCD IN RCRD: CPT | Mod: CPTII,S$GLB,, | Performed by: NURSE PRACTITIONER

## 2023-08-21 PROCEDURE — 3077F SYST BP >= 140 MM HG: CPT | Mod: CPTII,S$GLB,, | Performed by: NURSE PRACTITIONER

## 2023-08-21 PROCEDURE — 36415 COLL VENOUS BLD VENIPUNCTURE: CPT | Mod: PO | Performed by: NURSE PRACTITIONER

## 2023-08-21 PROCEDURE — 99214 OFFICE O/P EST MOD 30 MIN: CPT | Mod: S$GLB,,, | Performed by: NURSE PRACTITIONER

## 2023-08-21 PROCEDURE — 1100F PR PT FALLS ASSESS DOC 2+ FALLS/FALL W/INJURY/YR: ICD-10-PCS | Mod: CPTII,S$GLB,, | Performed by: NURSE PRACTITIONER

## 2023-08-21 PROCEDURE — 81000 URINALYSIS NONAUTO W/SCOPE: CPT | Mod: PO | Performed by: FAMILY MEDICINE

## 2023-08-21 PROCEDURE — 1125F PR PAIN SEVERITY QUANTIFIED, PAIN PRESENT: ICD-10-PCS | Mod: CPTII,S$GLB,, | Performed by: NURSE PRACTITIONER

## 2023-08-21 PROCEDURE — 83036 HEMOGLOBIN GLYCOSYLATED A1C: CPT | Performed by: NURSE PRACTITIONER

## 2023-08-21 PROCEDURE — 3288F PR FALLS RISK ASSESSMENT DOCUMENTED: ICD-10-PCS | Mod: CPTII,S$GLB,, | Performed by: NURSE PRACTITIONER

## 2023-08-21 PROCEDURE — 80053 COMPREHEN METABOLIC PANEL: CPT | Performed by: NURSE PRACTITIONER

## 2023-08-21 RX ORDER — NAPROXEN 500 MG/1
TABLET ORAL
COMMUNITY
End: 2023-11-20 | Stop reason: ALTCHOICE

## 2023-08-21 RX ORDER — NITROFURANTOIN 25; 75 MG/1; MG/1
1 CAPSULE ORAL 2 TIMES DAILY
COMMUNITY
End: 2023-08-21 | Stop reason: ALTCHOICE

## 2023-08-21 RX ORDER — METRONIDAZOLE 500 MG/1
TABLET ORAL
COMMUNITY
End: 2024-01-19

## 2023-08-21 RX ORDER — AMLODIPINE BESYLATE 5 MG/1
5 TABLET ORAL
COMMUNITY
Start: 2023-08-10

## 2023-08-21 RX ORDER — GLIPIZIDE 10 MG/1
1 TABLET, FILM COATED, EXTENDED RELEASE ORAL 2 TIMES DAILY
COMMUNITY
End: 2024-01-19

## 2023-08-21 RX ORDER — NITROFURANTOIN 25; 75 MG/1; MG/1
100 CAPSULE ORAL 2 TIMES DAILY
Qty: 14 CAPSULE | Refills: 0 | Status: SHIPPED | OUTPATIENT
Start: 2023-08-21 | End: 2023-08-29 | Stop reason: ALTCHOICE

## 2023-08-21 NOTE — ASSESSMENT & PLAN NOTE
Avoid anti-inflammatory medications. Increase hydration with water. Check renal function today. Follow up with nephrology.

## 2023-08-21 NOTE — PROGRESS NOTES
Assessment/Plan:  Problem List Items Addressed This Visit          Cardiac/Vascular    Hypertension associated with diabetes - Primary (Chronic)    Overview     CHRONIC. STABLE. BP Reviewed. BP above goal today. She has not taken BP medications yet today. Compliant with BP medications. No SE reported.   (-) CP, SOB, palpitations, dizziness, lightheadedness, HA, arm numbness, tingling or weakness, syncope.  Creatinine   Date Value Ref Range Status   07/06/2023 1.0 0.5 - 1.4 mg/dL Final     Hypertension Medications               amLODIPine (NORVASC) 5 MG tablet Take 5 mg by mouth.    carvediloL (COREG) 3.125 MG tablet TAKE 1 TABLET BY MOUTH EVERY MORNING and ONE IN THE EVENING WITH A MEAL    irbesartan-hydrochlorothiazide (AVALIDE) 300-12.5 mg per tablet irbesartan 300 mg-hydrochlorothiazide 12.5 mg tablet   TAKE 1 TABLET BY MOUTH EVERY DAY    isosorbide mononitrate (IMDUR) 30 MG 24 hr tablet Take 1 tablet (30 mg total) by mouth once daily.          Current Assessment & Plan     Continue current blood pressure medication regimen. Recommend ambulatory monitoring of BP. Notify PCP of fluctuations in readings or if BP remains greater than 140/80. Counseled on importance of hypertension disease course, I recommend ongoing Education for DASH-diet and exercise. Counseled on medication regimen importance of treating high blood pressure. Please be advised of risk of untreated blood pressure as discussed. Please advised of ER precautions were given for symptoms of hypertensive urgency and emergency.          Relevant Medications    glipiZIDE (GLUCOTROL) 10 MG TR24       Renal/    Stage 3a chronic kidney disease (Chronic)    Overview     Chronic.  Control is uncertain.  Patient with comorbid hypertension and diabetes.  Follows with nephrology.     BMP  Lab Results   Component Value Date     07/06/2023    K 3.9 07/06/2023     07/06/2023    CO2 28 07/06/2023    BUN 26 (H) 07/06/2023    CREATININE 1.0 07/06/2023     CALCIUM 9.1 07/06/2023    ANIONGAP 6 (L) 07/06/2023    ESTGFRAFRICA 39.0 (A) 07/22/2022    ESTGFRAFRICA 39.0 (A) 07/22/2022    EGFRNONAA 33.8 (A) 07/22/2022    EGFRNONAA 33.8 (A) 07/22/2022              Current Assessment & Plan     Avoid anti-inflammatory medications. Increase hydration with water. Check renal function today. Follow up with nephrology.             Endocrine    Type 2 diabetes mellitus    Overview     August 2023: Chronic. Stable. She has missed a few doses of Ozempic. Reports no longer taking Metformin. Due for labs.      July 2022:  Patient with recent hospitalization.  Patient reports that she is taking the Ozempic 0.25 milligrams.  She is having some nausea but otherwise has been better controlled.  Patient is due for A1c.  April 2022:  Patient with uncontrolled A1c from last labs performed [place].  Currently on metformin 1000 milligrams twice daily, Januvia 100 milligrams, glipizide 10 milligrams twice daily.  Noncompliance with diet.  Denies history of pancreatitis, thyroid cancer, MEN syndrome.    Diabetes Management Status    Statin: Taking  ACE/ARB: Taking    Screening or Prevention Patient's value Goal Complete/Controlled?   HgA1C Testing and Control   Lab Results   Component Value Date    HGBA1C 6.3 (H) 04/20/2023      Annually/Less than 8% No   Lipid profile : 06/30/2023 Annually No   LDL control Lab Results   Component Value Date    LDLCALC 105 06/30/2023    Annually/Less than 100 mg/dl  No   Nephropathy screening Lab Results   Component Value Date    LABMICR 55.0 07/22/2022     Lab Results   Component Value Date    PROTEINUA 1+ (A) 08/21/2023     Lab Results   Component Value Date    UTPCR 0.12 04/26/2023      Annually Yes   Blood pressure BP Readings from Last 1 Encounters:   08/21/23 (!) 150/90    Less than 140/90 Yes   Dilated retinal exam Most Recent Eye Exam Date: Not Found Annually No   Foot exam   Most Recent Foot Exam Date: Not Found Annually No              Current  "Assessment & Plan     Repeat labs today. Update urine micro. We will plan to monitor hemoglobin A1c at designated intervals 3 to 6 months.  I recommend ongoing Education for diabetic diet and exercise protocol.  We will continue to monitor for side effects.    Please be advised of symptoms to monitor for and to notify me immediately if persistent or worsening.  Follow up with Ophthalmology/Optometry and Podiatry at least annually.         Relevant Medications    glipiZIDE (GLUCOTROL) 10 MG TR24    Other Relevant Orders    Hemoglobin A1C (Completed)    Comprehensive Metabolic Panel (Completed)    Microalbumin/creatinine urine ratio       Other    Encounter for long-term (current) use of medications (Chronic)    Overview     CHRONIC. Stable. Compliant with medications for managed conditions. See medication list. No SE reported.   Routine lab analysis is being monitored. Refills were addressed.  Lab Results   Component Value Date    WBC 5.42 04/26/2023    HGB 10.1 (L) 04/26/2023    HCT 33.7 (L) 04/26/2023    MCV 91 04/26/2023     04/26/2023       Chemistry        Component Value Date/Time     07/06/2023 1006    K 3.9 07/06/2023 1006     07/06/2023 1006    CO2 28 07/06/2023 1006    BUN 26 (H) 07/06/2023 1006    CREATININE 1.0 07/06/2023 1006     (H) 07/06/2023 1006        Component Value Date/Time    CALCIUM 9.1 07/06/2023 1006    ALKPHOS 82 06/29/2023 2020    ALKPHOS 121 04/20/2023 0810    AST 14 06/29/2023 2020    AST 23 04/20/2023 0810    ALT 9 06/29/2023 2020    ALT 19 04/20/2023 0810    BILITOT 0.3 (L) 06/29/2023 2020    BILITOT 0.4 04/20/2023 0810    ESTGFRAFRICA 39.0 (A) 07/22/2022 0917    ESTGFRAFRICA 39.0 (A) 07/22/2022 0917    EGFRNONAA 33.8 (A) 07/22/2022 0917    EGFRNONAA 33.8 (A) 07/22/2022 0917        No results found for: "TSH", "H8VVOEB", "D8GRITY", "THYROIDAB", "FREET4", "T3FREE"         Current Assessment & Plan     Update labs today.Complete history and physical was completed " today.  Complete and thorough medication reconciliation was performed.  Discussed risks and benefits of medications.  Advised patient on orders and health maintenance.  We discussed old records and old labs if available.  Will request any records not available through epic.  Continue current medications listed on your summary sheet.          Follow up in about 6 months (around 2/21/2024), or if symptoms worsen or fail to improve.  ER precautions for severe or worsening symptoms.     Edna Wallace NP  _____________________________________________________________________________________________________________________________________________________    CC: follow up     HPI: Patient is in clinic today as an established patient here for follow up. Chronic condition has been reviewed and remains stable. Further details as stated above.     HTN: The patient is currently being treated for essential hypertension. This condition is chronic and stable. The patient is tolerating their medication well with good compliance.  Denies any adverse effects of medications.  Counseling was offered regarding low sodium diet.  The patient has a reduced salt intake. Routine exercise recommended. The patient denies headache, vision changes, chest pain, palpitations, shortness of breath, or lower extremity edema.    DM2: Patient presents for follow up of diabetes. Condition is chronic and stable. Patient denies symptoms, including foot ulcerations, hyperglycemia, hypoglycemia , nausea, paresthesia of the feet, polydipsia, polyuria and visual disturbances.  Evaluation to date has been included: fasting blood sugar, fasting lipid panel, hemoglobin A1C and microalbuminuria. Denies adverse effects of current medications.     Past Medical History:  Past Medical History:   Diagnosis Date    Allergy     Cataract     Dementia     Depression     Diabetes mellitus, type 2     Heart murmur     Hypertension     Stroke      Past Surgical History:    Procedure Laterality Date    EYE SURGERY      HIP REPLACEMENT ARTHROPLASTY      HYSTERECTOMY       Review of patient's allergies indicates:   Allergen Reactions    Morphine Anxiety and Other (See Comments)     Dizziness     Social History     Tobacco Use    Smoking status: Never    Smokeless tobacco: Never   Substance Use Topics    Alcohol use: Never    Drug use: Never     Family History   Problem Relation Age of Onset    Cancer Mother     Hypertension Mother     Cancer Sister      Current Outpatient Medications on File Prior to Visit   Medication Sig Dispense Refill    amLODIPine (NORVASC) 5 MG tablet Take 5 mg by mouth.      aspirin (ECOTRIN) 81 MG EC tablet Take 1 tablet (81 mg total) by mouth once. for 1 dose 30 tablet 3    atorvastatin (LIPITOR) 80 MG tablet Take 1 tablet (80 mg total) by mouth once daily. 90 tablet 4    carvediloL (COREG) 3.125 MG tablet TAKE 1 TABLET BY MOUTH EVERY MORNING and ONE IN THE EVENING WITH A MEAL      cefdinir (OMNICEF) 300 MG capsule Take by mouth.      cholecalciferol, vitamin D3, (VITAMIN D3) 25 mcg (1,000 unit) capsule Take 1 capsule (1,000 Units total) by mouth once daily. 90 capsule 3    diclofenac sodium (VOLTAREN) 1 % Gel Apply topically.      donepezil HCl (DONEPEZIL ORAL) donepezil Take No date recorded No form recorded No frequency recorded No route recorded No set duration recorded No set duration amount recorded active No dosage strength recorded No dosage strength units of measure recorded      estradioL (ESTRACE) 0.01 % (0.1 mg/gram) vaginal cream Place 1 g vaginally 3 (three) times a week.      fesoterodine (TOVIAZ) 4 mg Tb24 TAKE 1 TABLET BY MOUTH EVERY DAY 30 tablet 1    gabapentin (NEURONTIN) 100 MG capsule Take 200 mg by mouth nightly.      glipiZIDE (GLUCOTROL) 10 MG TR24 Take 1 tablet by mouth 2 (two) times daily.      irbesartan-hydrochlorothiazide (AVALIDE) 300-12.5 mg per tablet irbesartan 300 mg-hydrochlorothiazide 12.5 mg tablet   TAKE 1 TABLET BY MOUTH  EVERY DAY      isosorbide mononitrate (IMDUR) 30 MG 24 hr tablet Take 1 tablet (30 mg total) by mouth once daily. 30 tablet 2    JANUVIA 50 mg Tab Take 1 tablet (50 mg total) by mouth once daily. 90 tablet 4    LANCETS, SUPER THIN Misc TEST BLOOD SUGAR THREE TIMES DAILY AS DIRECTED 100 each 3    LIDOcaine (LIDODERM) 5 % SMARTSIG:Topical      metFORMIN (GLUCOPHAGE) 1000 MG tablet Take 1,000 mg by mouth 2 (two) times daily.      metroNIDAZOLE (FLAGYL) 500 MG tablet       naproxen (NAPROSYN) 500 MG tablet       ondansetron (ZOFRAN) 4 MG tablet Take 1 tablet (4 mg total) by mouth every 8 (eight) hours as needed for Nausea. 30 tablet 1    ACCU-CHEK AMELIA PLUS METER Misc Use 1 device  three times a day (Patient not taking: Reported on 7/6/2023) 1 each 0    ACCU-CHEK AMELIA PLUS TEST STRP Strp Inject 1 strip into the skin 3 (three) times daily. (Patient not taking: Reported on 7/6/2023) 100 each 3    nitrofurantoin, macrocrystal-monohydrate, (MACROBID) 100 MG capsule Take 1 capsule (100 mg total) by mouth 2 (two) times daily. 14 capsule 0     No current facility-administered medications on file prior to visit.     Review of Systems   Constitutional:  Positive for fatigue. Negative for appetite change, chills and fever.   HENT:  Negative for congestion, rhinorrhea and sore throat.    Eyes:  Negative for visual disturbance.   Respiratory:  Negative for cough and shortness of breath.    Cardiovascular:  Negative for chest pain, palpitations and leg swelling.   Gastrointestinal:  Negative for abdominal pain, diarrhea and vomiting.   Genitourinary:  Negative for difficulty urinating, dysuria, flank pain, frequency, hematuria and urgency.   Musculoskeletal:  Positive for arthralgias and gait problem. Negative for myalgias.   Skin:  Negative for rash and wound.   Neurological:  Positive for weakness. Negative for dizziness and headaches.   Psychiatric/Behavioral:  Negative for behavioral problems. The patient is not  "nervous/anxious.      Vitals:    08/21/23 0909   BP: (!) 150/90   Pulse: 68   SpO2: 96%   Weight: 60.6 kg (133 lb 11.2 oz)   Height: 5' 9" (1.753 m)     Wt Readings from Last 3 Encounters:   08/21/23 60.6 kg (133 lb 11.2 oz)   07/06/23 63 kg (138 lb 12.8 oz)   05/15/23 58.5 kg (128 lb 15.5 oz)     Physical Exam  Vitals reviewed.   Constitutional:       General: She is not in acute distress.     Appearance: Normal appearance. She is not ill-appearing.      Comments: Spouse present   HENT:      Head: Normocephalic and atraumatic.      Right Ear: External ear normal.      Left Ear: External ear normal.      Nose: Nose normal.   Eyes:      Extraocular Movements: Extraocular movements intact.      Conjunctiva/sclera: Conjunctivae normal.      Pupils: Pupils are equal, round, and reactive to light.   Cardiovascular:      Rate and Rhythm: Normal rate.      Pulses: Normal pulses.      Heart sounds: Normal heart sounds.   Pulmonary:      Effort: Pulmonary effort is normal. No respiratory distress.      Breath sounds: Normal breath sounds.   Abdominal:      General: Bowel sounds are normal.      Palpations: Abdomen is soft.   Musculoskeletal:         General: Normal range of motion.      Cervical back: Normal range of motion.      Right lower leg: No edema.      Left lower leg: No edema.      Comments: Impaired muscle strength and balance   Skin:     General: Skin is warm and dry.      Coloration: Skin is not pale.      Findings: No rash.   Neurological:      Mental Status: She is alert. Mental status is at baseline.      Motor: Weakness (generalized) present.      Gait: Gait abnormal (slowed).      Comments: Hx CVA- at baseline per    Psychiatric:         Attention and Perception: She is inattentive.         Speech: Speech is delayed. Speech is not rapid and pressured or slurred.         Behavior: Behavior is not slowed, withdrawn, hyperactive or combative. Behavior is cooperative.         Cognition and Memory: " Cognition is impaired. Memory is impaired.         Judgment: Judgment is impulsive and inappropriate.       Health Maintenance   Topic Date Due    Eye Exam  Never done    Shingles Vaccine (1 of 2) Never done    Hemoglobin A1c  11/21/2023    Lipid Panel  06/30/2024    DEXA Scan  08/13/2024    TETANUS VACCINE  08/08/2027    Hepatitis C Screening  Completed

## 2023-08-21 NOTE — ASSESSMENT & PLAN NOTE
Continue current blood pressure medication regimen. Recommend ambulatory monitoring of BP. Notify PCP of fluctuations in readings or if BP remains greater than 140/80. Counseled on importance of hypertension disease course, I recommend ongoing Education for DASH-diet and exercise. Counseled on medication regimen importance of treating high blood pressure. Please be advised of risk of untreated blood pressure as discussed. Please advised of ER precautions were given for symptoms of hypertensive urgency and emergency.

## 2023-08-21 NOTE — ASSESSMENT & PLAN NOTE
Repeat labs today. Update urine micro. We will plan to monitor hemoglobin A1c at designated intervals 3 to 6 months.  I recommend ongoing Education for diabetic diet and exercise protocol.  We will continue to monitor for side effects.    Please be advised of symptoms to monitor for and to notify me immediately if persistent or worsening.  Follow up with Ophthalmology/Optometry and Podiatry at least annually.

## 2023-08-22 ENCOUNTER — TELEPHONE (OUTPATIENT)
Dept: FAMILY MEDICINE | Facility: CLINIC | Age: 78
End: 2023-08-22
Payer: MEDICARE

## 2023-08-22 DIAGNOSIS — E11.65 TYPE 2 DIABETES MELLITUS WITH HYPERGLYCEMIA, WITHOUT LONG-TERM CURRENT USE OF INSULIN: Chronic | ICD-10-CM

## 2023-08-22 RX ORDER — SEMAGLUTIDE 1.34 MG/ML
0.5 INJECTION, SOLUTION SUBCUTANEOUS WEEKLY
Qty: 3 EACH | Refills: 4 | Status: SHIPPED | OUTPATIENT
Start: 2023-08-22 | End: 2023-08-23 | Stop reason: DRUGHIGH

## 2023-08-22 NOTE — TELEPHONE ENCOUNTER
Care Due:                  Date            Visit Type   Department     Provider  --------------------------------------------------------------------------------                                EP -                              PRIMARY      [facility]  Last Visit: 07-      CARE ([facility])   MEDICINE       Antoni Patel  Next Visit: None Scheduled  None         None Found                                                            Last  Test          Frequency    Reason                     Performed    Due Date  --------------------------------------------------------------------------------    Office Visit  12 months..  JANUVIA, atorvastatin,     07- 07-                             isosorbide, semaglutide..    [facility] Embedded Care Due Messages. Reference number: [id number].   8/22/2023 1:54:09 PM CDT

## 2023-08-22 NOTE — PROGRESS NOTES
Please call to make sure patient get the results.    [phone]  aMrianne, Your urinalysis is abnormal.  Showing   Infection.  I have sent antibiotic to the pharmacy called [facility].  Please pick this up and start taking it as soon as possible.  I have sent the urine off for culture for further evaluation of the bacteria causing this issue.  Follow-up with all specialists.

## 2023-08-22 NOTE — TELEPHONE ENCOUNTER
Patients daughter states that they need more needles for her Ozempic they seem to have misplaced them.

## 2023-08-22 NOTE — TELEPHONE ENCOUNTER
----- Message from Juliana Spicer sent at 8/22/2023 10:09 AM CDT -----  Patient is needing refills on the pen needles for her ozempic.     [facility] - [place] - [address]  [address]  [place] [address]  Phone: [phone] Fax: [phone]

## 2023-08-23 ENCOUNTER — TELEPHONE (OUTPATIENT)
Dept: FAMILY MEDICINE | Facility: CLINIC | Age: 78
End: 2023-08-23
Payer: MEDICARE

## 2023-08-23 DIAGNOSIS — N18.31 TYPE 2 DIABETES MELLITUS WITH STAGE 3A CHRONIC KIDNEY DISEASE, WITHOUT LONG-TERM CURRENT USE OF INSULIN: Primary | ICD-10-CM

## 2023-08-23 DIAGNOSIS — E11.22 TYPE 2 DIABETES MELLITUS WITH STAGE 3A CHRONIC KIDNEY DISEASE, WITHOUT LONG-TERM CURRENT USE OF INSULIN: Primary | ICD-10-CM

## 2023-08-23 LAB — BACTERIA UR CULT: NORMAL

## 2023-08-23 RX ORDER — SEMAGLUTIDE 1.34 MG/ML
1 INJECTION, SOLUTION SUBCUTANEOUS
Qty: 3 ML | Refills: 11 | Status: SHIPPED | OUTPATIENT
Start: 2023-08-23 | End: 2024-01-19

## 2023-08-23 NOTE — TELEPHONE ENCOUNTER
Recommend increasing the dose of her Ozempic.    I have signed for the following orders AND/OR meds.  Please call the patient and ask the patient to schedule the testing AND/OR inform about any medications that were sent. Medications have been sent to pharmacy listed below      Medications Ordered This Encounter   Medications    semaglutide (OZEMPIC) 1 mg/dose (4 mg/3 mL)     Sig: Inject 1 mg into the skin every 7 days.     Dispense:  3 mL     Refill:  11     [facility] - [place] [facility] [id number] [facility] [place] [address]  Phone: [phone] Fax: [phone]

## 2023-08-23 NOTE — TELEPHONE ENCOUNTER
Spoke to pt over the phone inform her test results . Pt verbalized understanding and states that has appt with dr smith nephrologist doctor in November . Pt states that she is not taking the metformin . She does take all the other medications     Diabetes medication on med list:   Glipizide  Januvia    Metformin   Semaglutide (OZEMPIC)

## 2023-08-23 NOTE — PROGRESS NOTES
1st check to see if patient has seen the results.  If not then  CALL patient with results and Document verification.  Schedule follow-up if needed.  [phone]  Urine culture has returned with no significant growth of any bacteria.  I recommend that you stop the antibiotic at this point if not having any symptoms.  Increase hydration with water.  Follow-up if having any symptoms of urinary tract infection other concerns.

## 2023-08-23 NOTE — TELEPHONE ENCOUNTER
----- Message from Edna Wallace NP sent at 8/23/2023 11:16 AM CDT -----  Please notify patient of results and recommendations.     Complete metabolic panel- Electrolytes, liver function, kidney function stable. Follow up with nephrology.  A1C- screening for diabetes is abnormal. Your A1C has worsened compared to previous. This test is gold standard screening test for diabetes.  It is a measures 3 months of your average blood sugar. Continue to monitor every 3-6 months. Recommend low-carb diet and increase exercise as tolerated. Complex medication list- Please verify medications and what she is taking as we likely need to make changes.     Diabetes medication on med list:   Glipizide  Januvia    Metformin   Semaglutide (OZEMPIC)

## 2023-08-24 ENCOUNTER — TELEPHONE (OUTPATIENT)
Dept: FAMILY MEDICINE | Facility: CLINIC | Age: 78
End: 2023-08-24
Payer: MEDICARE

## 2023-08-24 NOTE — TELEPHONE ENCOUNTER
----- Message from Howie Menendez sent at 8/24/2023 10:52 AM CDT -----  Contact: Shireen/ Jamari  .Type:  Patient Returning Call    Who Called: Shireen/ daughter    Who Left Message for Patient: Nurse   Does the patient know what this is regarding?:   Would the patient rather a call back or a response via [facility]?  Call   Best Call Back Number: [phone]

## 2023-08-24 NOTE — TELEPHONE ENCOUNTER
----- Message from Paula Jj sent at 8/24/2023  4:16 PM CDT -----  Contact: [place]\Katelin Thomason called to schedule a hospital followup for the pt. Please call the pt back at [phone].    Thanks  TS

## 2023-08-28 ENCOUNTER — OFFICE VISIT (OUTPATIENT)
Dept: FAMILY MEDICINE | Facility: CLINIC | Age: 78
End: 2023-08-28
Payer: MEDICARE

## 2023-08-28 VITALS
OXYGEN SATURATION: 96 % | BODY MASS INDEX: 19.7 KG/M2 | WEIGHT: 133 LBS | SYSTOLIC BLOOD PRESSURE: 138 MMHG | HEIGHT: 69 IN | DIASTOLIC BLOOD PRESSURE: 80 MMHG | HEART RATE: 85 BPM

## 2023-08-28 DIAGNOSIS — E11.59 HYPERTENSION ASSOCIATED WITH DIABETES: Chronic | ICD-10-CM

## 2023-08-28 DIAGNOSIS — E78.2 MIXED HYPERLIPIDEMIA: ICD-10-CM

## 2023-08-28 DIAGNOSIS — Z09 HOSPITAL DISCHARGE FOLLOW-UP: Primary | ICD-10-CM

## 2023-08-28 DIAGNOSIS — N18.31 STAGE 3A CHRONIC KIDNEY DISEASE: Chronic | ICD-10-CM

## 2023-08-28 DIAGNOSIS — I15.2 HYPERTENSION ASSOCIATED WITH DIABETES: Chronic | ICD-10-CM

## 2023-08-28 DIAGNOSIS — R35.0 URINARY FREQUENCY: ICD-10-CM

## 2023-08-28 DIAGNOSIS — R07.89 ATYPICAL CHEST PAIN: ICD-10-CM

## 2023-08-28 DIAGNOSIS — Z79.899 ENCOUNTER FOR LONG-TERM (CURRENT) USE OF MEDICATIONS: Chronic | ICD-10-CM

## 2023-08-28 LAB
BACTERIA #/AREA URNS HPF: ABNORMAL /HPF
BILIRUB UR QL STRIP: NEGATIVE
CLARITY UR: ABNORMAL
COLOR UR: YELLOW
GLUCOSE UR QL STRIP: NEGATIVE
HGB UR QL STRIP: NEGATIVE
HYALINE CASTS #/AREA URNS LPF: 1 /LPF
KETONES UR QL STRIP: NEGATIVE
LEUKOCYTE ESTERASE UR QL STRIP: ABNORMAL
MICROSCOPIC COMMENT: ABNORMAL
NITRITE UR QL STRIP: NEGATIVE
NON-SQ EPI CELLS #/AREA URNS HPF: 2 /HPF
PH UR STRIP: 6.5 [PH] (ref 5–8)
PROT UR QL STRIP: ABNORMAL
RBC #/AREA URNS HPF: 3 /HPF (ref 0–4)
SP GR UR STRIP: 1.01 (ref 1–1.03)
SQUAMOUS #/AREA URNS HPF: 5 /HPF
URN SPEC COLLECT METH UR: ABNORMAL
WBC #/AREA URNS HPF: >100 /HPF (ref 0–5)

## 2023-08-28 PROCEDURE — 99214 OFFICE O/P EST MOD 30 MIN: CPT | Mod: S$GLB,,, | Performed by: NURSE PRACTITIONER

## 2023-08-28 PROCEDURE — 81000 URINALYSIS NONAUTO W/SCOPE: CPT | Mod: PO | Performed by: NURSE PRACTITIONER

## 2023-08-28 PROCEDURE — 99214 PR OFFICE/OUTPT VISIT, EST, LEVL IV, 30-39 MIN: ICD-10-PCS | Mod: S$GLB,,, | Performed by: NURSE PRACTITIONER

## 2023-08-28 PROCEDURE — 1126F PR PAIN SEVERITY QUANTIFIED, NO PAIN PRESENT: ICD-10-PCS | Mod: CPTII,S$GLB,, | Performed by: NURSE PRACTITIONER

## 2023-08-28 PROCEDURE — 1160F PR REVIEW ALL MEDS BY PRESCRIBER/CLIN PHARMACIST DOCUMENTED: ICD-10-PCS | Mod: CPTII,S$GLB,, | Performed by: NURSE PRACTITIONER

## 2023-08-28 PROCEDURE — 1159F PR MEDICATION LIST DOCUMENTED IN MEDICAL RECORD: ICD-10-PCS | Mod: CPTII,S$GLB,, | Performed by: NURSE PRACTITIONER

## 2023-08-28 PROCEDURE — 87086 URINE CULTURE/COLONY COUNT: CPT | Performed by: NURSE PRACTITIONER

## 2023-08-28 PROCEDURE — 3079F PR MOST RECENT DIASTOLIC BLOOD PRESSURE 80-89 MM HG: ICD-10-PCS | Mod: CPTII,S$GLB,, | Performed by: NURSE PRACTITIONER

## 2023-08-28 PROCEDURE — 3079F DIAST BP 80-89 MM HG: CPT | Mod: CPTII,S$GLB,, | Performed by: NURSE PRACTITIONER

## 2023-08-28 PROCEDURE — 99999 PR PBB SHADOW E&M-EST. PATIENT-LVL V: CPT | Mod: PBBFAC,,, | Performed by: NURSE PRACTITIONER

## 2023-08-28 PROCEDURE — 1126F AMNT PAIN NOTED NONE PRSNT: CPT | Mod: CPTII,S$GLB,, | Performed by: NURSE PRACTITIONER

## 2023-08-28 PROCEDURE — 3288F PR FALLS RISK ASSESSMENT DOCUMENTED: ICD-10-PCS | Mod: CPTII,S$GLB,, | Performed by: NURSE PRACTITIONER

## 2023-08-28 PROCEDURE — 3075F SYST BP GE 130 - 139MM HG: CPT | Mod: CPTII,S$GLB,, | Performed by: NURSE PRACTITIONER

## 2023-08-28 PROCEDURE — 99999 PR PBB SHADOW E&M-EST. PATIENT-LVL V: ICD-10-PCS | Mod: PBBFAC,,, | Performed by: NURSE PRACTITIONER

## 2023-08-28 PROCEDURE — 3075F PR MOST RECENT SYSTOLIC BLOOD PRESS GE 130-139MM HG: ICD-10-PCS | Mod: CPTII,S$GLB,, | Performed by: NURSE PRACTITIONER

## 2023-08-28 PROCEDURE — 1101F PR PT FALLS ASSESS DOC 0-1 FALLS W/OUT INJ PAST YR: ICD-10-PCS | Mod: CPTII,S$GLB,, | Performed by: NURSE PRACTITIONER

## 2023-08-28 PROCEDURE — 1160F RVW MEDS BY RX/DR IN RCRD: CPT | Mod: CPTII,S$GLB,, | Performed by: NURSE PRACTITIONER

## 2023-08-28 PROCEDURE — 1101F PT FALLS ASSESS-DOCD LE1/YR: CPT | Mod: CPTII,S$GLB,, | Performed by: NURSE PRACTITIONER

## 2023-08-28 PROCEDURE — 1159F MED LIST DOCD IN RCRD: CPT | Mod: CPTII,S$GLB,, | Performed by: NURSE PRACTITIONER

## 2023-08-28 PROCEDURE — 3288F FALL RISK ASSESSMENT DOCD: CPT | Mod: CPTII,S$GLB,, | Performed by: NURSE PRACTITIONER

## 2023-08-28 RX ORDER — VIBEGRON 75 MG/1
TABLET, FILM COATED ORAL
COMMUNITY
End: 2024-01-25 | Stop reason: SDUPTHER

## 2023-08-28 NOTE — PROGRESS NOTES
Assessment/Plan:  Problem List Items Addressed This Visit          Cardiac/Vascular    Hypertension associated with diabetes (Chronic)    Overview     CHRONIC. STABLE. BP Reviewed. BP at goal today. Compliant with BP medications. No SE reported.   (-) CP, SOB, palpitations, dizziness, lightheadedness, HA, arm numbness, tingling or weakness, syncope.  Creatinine   Date Value Ref Range Status   08/24/2023 0.81 0.60 - 1.10 mg/dL Final   08/21/2023 1.1 0.5 - 1.4 mg/dL Final     Hypertension Medications               amLODIPine (NORVASC) 5 MG tablet Take 5 mg by mouth.    carvediloL (COREG) 3.125 MG tablet TAKE 1 TABLET BY MOUTH EVERY MORNING and ONE IN THE EVENING WITH A MEAL    irbesartan-hydrochlorothiazide (AVALIDE) 300-12.5 mg per tablet irbesartan 300 mg-hydrochlorothiazide 12.5 mg tablet   TAKE 1 TABLET BY MOUTH EVERY DAY    isosorbide mononitrate (IMDUR) 30 MG 24 hr tablet Take 1 tablet (30 mg total) by mouth once daily.          Current Assessment & Plan     Continue current blood pressure medication regimen. Recommend ambulatory monitoring of BP. Notify PCP of fluctuations in readings or if BP remains greater than 140/80. Counseled on importance of hypertension disease course, I recommend ongoing Education for DASH-diet and exercise. Counseled on medication regimen importance of treating high blood pressure. Please be advised of risk of untreated blood pressure as discussed. Please advised of ER precautions were given for symptoms of hypertensive urgency and emergency.          Mixed hyperlipidemia    Overview     CHRONIC. STABLE. Lab analysis reviewed.   (-) CP, SOB, abdominal pain, N/V/D, constipation, jaundice, skin changes.  (-) Myalgias  Lab Results   Component Value Date    CHOL 164 06/30/2023    CHOL 140 04/20/2023    CHOL 136 07/22/2022     Lab Results   Component Value Date    HDL 34 06/30/2023    HDL 35 (L) 04/20/2023    HDL 22 (L) 07/22/2022     Lab Results   Component Value Date    LDLCALC 105  06/30/2023    LDLCALC 82.6 04/20/2023    LDLCALC 90.2 07/22/2022     Lab Results   Component Value Date    TRIG 127 06/30/2023    TRIG 112 04/20/2023    TRIG 119 07/22/2022     Lab Results   Component Value Date    CHOLHDL 4.8 (H) 06/30/2023    CHOLHDL 25.0 04/20/2023    CHOLHDL 16.2 (L) 07/22/2022     Lab Results   Component Value Date    TOTALCHOLEST 4.0 04/20/2023    TOTALCHOLEST 6.2 (H) 07/22/2022     Lab Results   Component Value Date    ALT 16 08/23/2023    AST 20 08/23/2023    ALKPHOS 95 08/23/2023    BILITOT 0.5 08/23/2023     ======================================================  The ASCVD Risk score (Lucy GARCIA, et al., 2019) failed to calculate for the following reasons:    The patient has a prior MI or stroke diagnosis           Current Assessment & Plan     Continue Statin. Counseled on hyperlipidemia disease course, healthy diet and increased need for exercise. Please be advised of the risk of cardiovascular disease, increase stroke and heart attack risk with uncontrolled/untreated hyperlipidemia. Patient voiced understanding and understood the treatment plan. All questions were answered.             Renal/    Stage 3a chronic kidney disease (Chronic)    Overview     Chronic.  Control is uncertain.  Patient with comorbid hypertension and diabetes.  Follows with nephrology.     BMP  Lab Results   Component Value Date     08/24/2023    K 3.2 (L) 08/24/2023     08/21/2023    CO2 26 08/24/2023    BUN 22 (H) 08/24/2023    CREATININE 0.81 08/24/2023    CALCIUM 9.3 08/24/2023    ANIONGAP 10 08/24/2023    ESTGFRAFRICA 51 (L) 10/06/2022    EGFRNONAA 33.8 (A) 07/22/2022    EGFRNONAA 33.8 (A) 07/22/2022              Current Assessment & Plan     Avoid anti-inflammatory medications. Increase hydration with water. Check renal function today. Follow up with nephrology.             Other    Atypical chest pain    Overview     ED visit 8/23/23 for chest pain. Cardiac workup negative.  Follows with  cardiologist, Dr. Kimball   Chest pain appears to be atypical, reproducible to palpation.  Patient with history of CAD status post cardiac stenting.  She has a pacemaker  She is on aspirin and statin  Troponin WNL  EKG - NSR with PACs  Echo 8/24/23  Interpretation Summary   Ejection Fraction = 60-65%.   Left ventricular systolic function is normal.   Grade 1 Diastolic Dysfunction   There is mild mitral regurgitation.           Current Assessment & Plan     Continue current medication regimen. Recommend close follow up with cardiology.           Other Visit Diagnoses       Hospital discharge follow-up    -  Primary    Urinary frequency        Relevant Orders    Urinalysis, Reflex to Urine Culture Urine, Clean Catch (Completed)          Follow up if symptoms worsen or fail to improve.  ER precautions for severe or worsening symptoms.     Edna Wallace NP  _____________________________________________________________________________________________________________________________________________________    CC: hospital follow up     HPI: Patient is in clinic today as an established patient here for hospital follow up. She was recently seen at [facility] on 8/23/23 for chest pain. Recent encounter reviewed below:    History of Present Illness:   77-year-old female with past medical history particular significant for hypertension, dyslipidemia, type 2 diabetes mellitus, CAD status post cardiac stenting, chronic diastolic CHF, pacemaker placement, dementia, asthma, CKD 3, who presented to our ED complaining of chest pain that started yesterday and got worse today, up to 9/10 in intensity, left-sided, worse with coughing and sneezing and when she turns in certain ways and when she presses on the area.  Of note, patient was admitted to our hospital in June 2023 with chest pain, evaluated by cardiology, pain felt to be atypical and outpatient follow-up was recommended.  Of note, patient was started on nitrofurantoin 2 days ago  for possible UTI/abnormal UA.     In the ED, patient was severely hypertensive with a systolic blood pressure in the 190s.   Patient reports that she was recently started on amlodipine and that she is compliant with her antihypertensive medications.  Work-up showed hemoglobin 10.5, glucose 126, magnesium 1.5.  Received albuterol, aspirin 325 mg, nighttime carvedilol 6.25 mg, hydralazine 10 mg IV and Nitropaste.  Blood pressure improved.  At the time of evaluation, patient denied any current chest pain however when I pressed on her left chest, this caused pain.    PLAN:   Atypical chest pain  Chest pain appears to be atypical, reproducible to palpation.  Patient with history of CAD status post cardiac stenting.  She is on aspirin that we are going to continue.  We will order nitroglycerin as needed.  We will monitor intermittent, check repeat troponin and obtain echocardiogram.  Of note labs in June showed a normal TSH, cholesterol 164, , HDL 34, triglyceride 127. A1c on 8/21/2023 was 8.1.  We will consult cardiology.  We will keep her n.p.o. after midnight.    Uncontrolled hypertension  We will continue amlodipine, carvedilol, irbesartan-HCTZ.  We will order IV hydralazine as needed for severe hypertension despite pain control.  We will monitor vital signs and adjust regimen as needed/warranted    Chronic diastolic congestive heart failure (HCC)  Does not appear to be in acute exacerbation.  We will monitor intake and output and measure daily weight.  Echo in July 2022 showed:  Left ventricular systolic function is normal.  Ejection Fraction = 60-65%.  Grade 1 Diastolic Dysfunction  There is mild mitral regurgitation.  There is mild tricuspid regurgitation.  RVSP= 33mm/Hg  Aortic sclerosis is mild.  There is no aortic valvular stenosis.  We will obtain repeat echocardiogram for follow-up.    Cardiac pacemaker in situ  We will interrogate her device.    Urinary tract infection  Patient with abnormal UA 2 days  ago, started on nitrofurantoin. Urine culture showed no significant growth.  We will continue nitrofurantoin for now.  Check repeat UA.    Hypomagnesemia  We will give her IV magnesium and monitor electrolytes.    Type 2 diabetes mellitus  We will hold Ozempic and oral antidiabetic medications while in the hospital.  We will order Humalog per sliding scale.  If severe/uncontrolled hyperglycemia, will consider adding Lantus.  Recent A1c 8.1.  We will monitor blood glucose and adjust regimen as needed/warranted.    Chronic anemia  Hemoglobin around baseline.  No bleeding reported.  We will monitor CBC.    Chronic kidney disease, stage III (moderate) ([facility])  Creatinine/kidney function currently around baseline.  We will monitor intake and output.  We will monitor kidney function and electrolytes.  We will avoid nephrotoxic medications.    Asthma  We will order albuterol as needed.     Since hospital follow up she reports feeling better. Chest pain improved. She has had no palpitations, SOB. She has resumed normal activities. Plans to follow up with cardiologist soon. BP at goal today. The patient has a diagnosis of hypertension and the blood pressure has been high on recent checks.  The patient has been compliant on the medicine listed below and has not had problems with side effects.  The patient has had no headache, vision changes, chest pain, shortness of breath, dizziness, palpitations.  Denies any identifiable exacerbating or relieving factors. She continues to have some urinary frequency. Denies dysuria, hematuria, flanks pain. Will repeat U/A.     Past Medical History:  Past Medical History:   Diagnosis Date    Allergy     Cataract     Dementia     Depression     Diabetes mellitus, type 2     Heart murmur     Hypertension     Stroke      Past Surgical History:   Procedure Laterality Date    EYE SURGERY      HIP REPLACEMENT ARTHROPLASTY      HYSTERECTOMY       Review of patient's allergies indicates:   Allergen  Reactions    Morphine Anxiety and Other (See Comments)     Dizziness     Social History     Tobacco Use    Smoking status: Never    Smokeless tobacco: Never   Substance Use Topics    Alcohol use: Never    Drug use: Never     Family History   Problem Relation Age of Onset    Cancer Mother     Hypertension Mother     Cancer Sister      Current Outpatient Medications on File Prior to Visit   Medication Sig Dispense Refill    amLODIPine (NORVASC) 5 MG tablet Take 5 mg by mouth.      aspirin (ECOTRIN) 81 MG EC tablet Take 1 tablet (81 mg total) by mouth once. for 1 dose 30 tablet 3    atorvastatin (LIPITOR) 80 MG tablet Take 1 tablet (80 mg total) by mouth once daily. 90 tablet 4    carvediloL (COREG) 3.125 MG tablet TAKE 1 TABLET BY MOUTH EVERY MORNING and ONE IN THE EVENING WITH A MEAL      cholecalciferol, vitamin D3, (VITAMIN D3) 25 mcg (1,000 unit) capsule Take 1 capsule (1,000 Units total) by mouth once daily. 90 capsule 3    gabapentin (NEURONTIN) 100 MG capsule Take 300 mg by mouth nightly.      glipiZIDE (GLUCOTROL) 10 MG TR24 Take 1 tablet by mouth 2 (two) times daily.      irbesartan-hydrochlorothiazide (AVALIDE) 300-12.5 mg per tablet irbesartan 300 mg-hydrochlorothiazide 12.5 mg tablet   TAKE 1 TABLET BY MOUTH EVERY DAY      isosorbide mononitrate (IMDUR) 30 MG 24 hr tablet Take 1 tablet (30 mg total) by mouth once daily. 30 tablet 2    JANUVIA 50 mg Tab Take 1 tablet (50 mg total) by mouth once daily. 90 tablet 4    semaglutide (OZEMPIC) 1 mg/dose (4 mg/3 mL) Inject 1 mg into the skin every 7 days. 3 mL 11    vibegron (GEMTESA) 75 mg Tab Take by mouth.      ACCU-CHEK AMELIA PLUS METER Misc Use 1 device  three times a day (Patient not taking: Reported on 7/6/2023) 1 each 0    ACCU-CHEK AMELIA PLUS TEST STRP Strp Inject 1 strip into the skin 3 (three) times daily. (Patient not taking: Reported on 7/6/2023) 100 each 3    diclofenac sodium (VOLTAREN) 1 % Gel Apply topically.      donepezil HCl (DONEPEZIL ORAL)  donepezil Take No date recorded No form recorded No frequency recorded No route recorded No set duration recorded No set duration amount recorded active No dosage strength recorded No dosage strength units of measure recorded      estradioL (ESTRACE) 0.01 % (0.1 mg/gram) vaginal cream Place 1 g vaginally 3 (three) times a week.      fesoterodine (TOVIAZ) 4 mg Tb24 TAKE 1 TABLET BY MOUTH EVERY DAY (Patient not taking: Reported on 8/28/2023) 30 tablet 1    LANCETS, SUPER THIN Misc TEST BLOOD SUGAR THREE TIMES DAILY AS DIRECTED (Patient not taking: Reported on 8/28/2023) 100 each 3    LIDOcaine (LIDODERM) 5 % SMARTSIG:Topical      metFORMIN (GLUCOPHAGE) 1000 MG tablet Take 1,000 mg by mouth 2 (two) times daily.      metroNIDAZOLE (FLAGYL) 500 MG tablet       naproxen (NAPROSYN) 500 MG tablet       ondansetron (ZOFRAN) 4 MG tablet Take 1 tablet (4 mg total) by mouth every 8 (eight) hours as needed for Nausea. (Patient not taking: Reported on 8/28/2023) 30 tablet 1     No current facility-administered medications on file prior to visit.     Review of Systems   Constitutional:  Positive for fatigue. Negative for appetite change, chills and fever.   HENT:  Negative for congestion, rhinorrhea and sore throat.    Eyes:  Negative for visual disturbance.   Respiratory:  Negative for cough and shortness of breath.    Cardiovascular:  Negative for chest pain, palpitations and leg swelling.   Gastrointestinal:  Negative for abdominal pain, diarrhea and vomiting.   Genitourinary:  Negative for difficulty urinating, dysuria, flank pain, frequency, hematuria and urgency.   Musculoskeletal:  Positive for arthralgias and gait problem. Negative for myalgias.   Skin:  Negative for rash and wound.   Neurological:  Positive for weakness. Negative for dizziness and headaches.   Psychiatric/Behavioral:  Negative for behavioral problems. The patient is not nervous/anxious.      Vitals:    08/28/23 1129   BP: 138/80   Pulse: 85   SpO2: 96%  "  Weight: 60.3 kg (133 lb)   Height: 5' 9" (1.753 m)     Wt Readings from Last 3 Encounters:   08/28/23 60.3 kg (133 lb)   08/21/23 60.6 kg (133 lb 11.2 oz)   07/06/23 63 kg (138 lb 12.8 oz)     Physical Exam  Vitals reviewed.   Constitutional:       General: She is not in acute distress.     Appearance: Normal appearance. She is not ill-appearing.      Comments: Spouse present   HENT:      Head: Normocephalic and atraumatic.      Right Ear: External ear normal.      Left Ear: External ear normal.      Nose: Nose normal.   Eyes:      Extraocular Movements: Extraocular movements intact.      Conjunctiva/sclera: Conjunctivae normal.      Pupils: Pupils are equal, round, and reactive to light.   Cardiovascular:      Rate and Rhythm: Normal rate.      Pulses: Normal pulses.      Heart sounds: Normal heart sounds.   Pulmonary:      Effort: Pulmonary effort is normal. No respiratory distress.      Breath sounds: Normal breath sounds.   Abdominal:      General: Bowel sounds are normal.      Palpations: Abdomen is soft.   Musculoskeletal:         General: Normal range of motion.      Cervical back: Normal range of motion.      Right lower leg: No edema.      Left lower leg: No edema.      Comments: Impaired muscle strength and balance   Skin:     General: Skin is warm and dry.      Coloration: Skin is not pale.      Findings: No rash.   Neurological:      Mental Status: She is alert. Mental status is at baseline.      Motor: Weakness (generalized) present.      Gait: Gait abnormal (slowed).      Comments: Hx CVA- at baseline per    Psychiatric:         Attention and Perception: She is inattentive.         Speech: Speech is delayed. Speech is not rapid and pressured or slurred.         Behavior: Behavior is not slowed, withdrawn, hyperactive or combative. Behavior is cooperative.         Cognition and Memory: Cognition is impaired. Memory is impaired.         Judgment: Judgment is impulsive and inappropriate. "       Health Maintenance   Topic Date Due    Eye Exam  Never done    Shingles Vaccine (1 of 2) Never done    Hemoglobin A1c  11/21/2023    Lipid Panel  06/30/2024    DEXA Scan  08/13/2024    TETANUS VACCINE  08/08/2027    Hepatitis C Screening  Completed

## 2023-08-29 DIAGNOSIS — N39.0 RECURRENT UTI: Primary | ICD-10-CM

## 2023-08-29 RX ORDER — CEPHALEXIN 500 MG/1
500 CAPSULE ORAL EVERY 12 HOURS
Qty: 20 CAPSULE | Refills: 0 | Status: SHIPPED | OUTPATIENT
Start: 2023-08-29 | End: 2024-01-19

## 2023-08-30 LAB — BACTERIA UR CULT: NO GROWTH

## 2023-08-30 NOTE — ASSESSMENT & PLAN NOTE
Reviewed recent labs via Care everywhere. Complete history and physical was completed today.  Complete and thorough medication reconciliation was performed.  Discussed risks and benefits of medications.  Advised patient on orders and health maintenance.  We discussed old records and old labs if available.  Will request any records not available through epic.  Continue current medications listed on your summary sheet.

## 2023-09-02 NOTE — TELEPHONE ENCOUNTER
No care due was identified.  [facility] Embedded Care Due Messages. Reference number: [id number].   9/02/2023 3:07:51 PM CDT

## 2023-09-05 RX ORDER — ISOSORBIDE MONONITRATE 30 MG/1
30 TABLET, EXTENDED RELEASE ORAL
Qty: 90 TABLET | Refills: 1 | Status: SHIPPED | OUTPATIENT
Start: 2023-09-05

## 2023-09-05 NOTE — TELEPHONE ENCOUNTER
Refill Routing Note     Refill Routing Note   Medication(s) are not appropriate for processing by [facility] for the following reason(s):      Patient seen in ED/Hospital since LOV with provider    [facility] action(s):  Defer Care Due:  None identified            Appointments  past 12m or future 3m with PCP    Date Provider   Last Visit   7/19/2022 Antoni Patel MD   Next Visit   Visit date not found Antoni Patel MD   ED visits in past 90 days: 0        Note composed:10:50 AM 09/05/2023

## 2023-09-06 ENCOUNTER — OFFICE VISIT (OUTPATIENT)
Dept: UROLOGY | Facility: CLINIC | Age: 78
End: 2023-09-06
Payer: MEDICARE

## 2023-09-06 VITALS
RESPIRATION RATE: 16 BRPM | HEIGHT: 69 IN | DIASTOLIC BLOOD PRESSURE: 86 MMHG | WEIGHT: 133 LBS | SYSTOLIC BLOOD PRESSURE: 152 MMHG | HEART RATE: 83 BPM | TEMPERATURE: 97 F | BODY MASS INDEX: 19.7 KG/M2

## 2023-09-06 DIAGNOSIS — N39.0 RECURRENT UTI: ICD-10-CM

## 2023-09-06 PROBLEM — R07.89 ATYPICAL CHEST PAIN: Status: RESOLVED | Noted: 2022-07-03 | Resolved: 2023-09-06

## 2023-09-06 PROBLEM — S42.402A LEFT ELBOW FRACTURE, CLOSED, INITIAL ENCOUNTER: Status: RESOLVED | Noted: 2021-06-29 | Resolved: 2023-09-06

## 2023-09-06 PROCEDURE — 99214 PR OFFICE/OUTPT VISIT, EST, LEVL IV, 30-39 MIN: ICD-10-PCS | Mod: S$GLB,,, | Performed by: NURSE PRACTITIONER

## 2023-09-06 PROCEDURE — 1126F PR PAIN SEVERITY QUANTIFIED, NO PAIN PRESENT: ICD-10-PCS | Mod: CPTII,S$GLB,, | Performed by: NURSE PRACTITIONER

## 2023-09-06 PROCEDURE — 51798 US URINE CAPACITY MEASURE: CPT | Mod: S$GLB,,, | Performed by: NURSE PRACTITIONER

## 2023-09-06 PROCEDURE — 3077F SYST BP >= 140 MM HG: CPT | Mod: CPTII,S$GLB,, | Performed by: NURSE PRACTITIONER

## 2023-09-06 PROCEDURE — 99999 PR PBB SHADOW E&M-EST. PATIENT-LVL III: ICD-10-PCS | Mod: PBBFAC,,, | Performed by: NURSE PRACTITIONER

## 2023-09-06 PROCEDURE — 1126F AMNT PAIN NOTED NONE PRSNT: CPT | Mod: CPTII,S$GLB,, | Performed by: NURSE PRACTITIONER

## 2023-09-06 PROCEDURE — 99999 PR PBB SHADOW E&M-EST. PATIENT-LVL III: CPT | Mod: PBBFAC,,, | Performed by: NURSE PRACTITIONER

## 2023-09-06 PROCEDURE — 3079F DIAST BP 80-89 MM HG: CPT | Mod: CPTII,S$GLB,, | Performed by: NURSE PRACTITIONER

## 2023-09-06 PROCEDURE — 81003 URINALYSIS AUTO W/O SCOPE: CPT | Mod: QW,S$GLB,, | Performed by: NURSE PRACTITIONER

## 2023-09-06 PROCEDURE — 3077F PR MOST RECENT SYSTOLIC BLOOD PRESSURE >= 140 MM HG: ICD-10-PCS | Mod: CPTII,S$GLB,, | Performed by: NURSE PRACTITIONER

## 2023-09-06 PROCEDURE — 51798 PR MEAS,POST-VOID RES,US,NON-IMAGING: ICD-10-PCS | Mod: S$GLB,,, | Performed by: NURSE PRACTITIONER

## 2023-09-06 PROCEDURE — 81003 PR URINALYSIS, AUTO, W/O SCOPE: ICD-10-PCS | Mod: QW,S$GLB,, | Performed by: NURSE PRACTITIONER

## 2023-09-06 PROCEDURE — 99214 OFFICE O/P EST MOD 30 MIN: CPT | Mod: S$GLB,,, | Performed by: NURSE PRACTITIONER

## 2023-09-06 PROCEDURE — 3079F PR MOST RECENT DIASTOLIC BLOOD PRESSURE 80-89 MM HG: ICD-10-PCS | Mod: CPTII,S$GLB,, | Performed by: NURSE PRACTITIONER

## 2023-09-06 NOTE — PROGRESS NOTES
Chief Complaint:   Recurrent urinary tract    HPI:   Patient is presenting as a follow-up to recurrent urinary tract infections.  Needs states that patient was treated for to previous urinary tract infections, however, a review of EMR, urine cultures were negative.  Patient is currently asymptomatic in urine in clinic is negative.  PVR 7 mL.  Denies pelvic or flank pain.  Denies gross hematuria.  04/11/2023  Patient is a 77-year-old female that is presenting with her granddaughter.  Patient was recently treated for E-coli cystitis.  Is currently asymptomatic.  Unable to give urine sample, will proceed with catheter sample.  Denies gross hematuria.  03/09/2023  Patient is a 77-year-old female that is presenting with a family member.  Patient was seen for recurrent urinary tract infections and has been asymptomatic since our last visit.  Urine in clinic indicates trace leukocytes, all other parameters are negative.  At last visit, patient was frail and a wheelchair, patient is able to ambulate and void independently.  Patient has had a renal ultrasound in the last year which was normal.  11/09/2022  Patient is a 77-year-old female that is presenting with her family.  Family states that patient has had recurrent E coli cystitis.  Patient is in a wheelchair in wears a depends.  No history of gross hematuria or renal stones.  Recent renal ultrasound was negative for renal stones, masses or hydronephrosis. Urine in clinic is negative.    Allergies:  Morphine    Medications:  has a current medication list which includes the following prescription(s): accu-chek candida plus meter, accu-chek candida plus test strp, amlodipine, aspirin, atorvastatin, carvedilol, cephalexin, cholecalciferol (vitamin d3), diclofenac sodium, donepezil hcl, estradiol, fesoterodine, gabapentin, glipizide, irbesartan-hydrochlorothiazide, isosorbide mononitrate, januvia, lancets, super thin, lidocaine, metformin, metronidazole, naproxen, ondansetron,  ozempic, and gemtesa.    Review of Systems:  General: No fever, chills, fatigability, or weight loss.  Skin: No rashes, itching, or changes in color or texture of skin.  Chest: Denies LIANG, cyanosis, wheezing, cough, and sputum production.  Abdomen: Appetite fine. No weight loss. Denies diarrhea, abdominal pain, hematemesis, or blood in stool.  Musculoskeletal: No joint stiffness or swelling. Denies back pain.  : As above.  All other review of systems negative.    PMH:   has a past medical history of Allergy, Cataract, Dementia, Depression, Diabetes mellitus, type 2, Heart murmur, Hypertension, and Stroke.    PSH:   has a past surgical history that includes Hip replacement arthroplasty; Eye surgery; and Hysterectomy.    FamHx: family history includes Cancer in her mother and sister; Hypertension in her mother.    SocHx:  reports that she has never smoked. She has never used smokeless tobacco. She reports that she does not drink alcohol and does not use drugs.      Physical Exam:  General: A&Ox3, no apparent distress, no deformities  Neck: No masses, normal thyroid  Lungs: normal inspiration, no use of accessory muscles  Heart: normal pulse, no arrhythmias  Abdomen: Soft, NT, ND, no masses, no hernias, no hepatosplenomegaly  Lymphatic: Neck and groin nodes negative    Labs/Studies:   Urine in clinic is negative    Impression/Plan:   Recurrent urinary tract infections  Patient and family were educated on need to follow-up with our clinic solely, with any symptoms of urinary tract infection.  Patient lives in [place], however, can give a urine for culture at the [place] [facility] location.  Is currently asymptomatic in urine in clinic is negative.  Patient to follow-up with our clinic p.r.n..

## 2023-11-13 ENCOUNTER — LAB VISIT (OUTPATIENT)
Dept: LAB | Facility: HOSPITAL | Age: 78
End: 2023-11-13
Payer: MEDICARE

## 2023-11-13 DIAGNOSIS — E11.59 HYPERTENSION ASSOCIATED WITH DIABETES: Chronic | ICD-10-CM

## 2023-11-13 DIAGNOSIS — E11.22 TYPE 2 DIABETES MELLITUS WITH STAGE 3A CHRONIC KIDNEY DISEASE, WITHOUT LONG-TERM CURRENT USE OF INSULIN: ICD-10-CM

## 2023-11-13 DIAGNOSIS — N18.31 TYPE 2 DIABETES MELLITUS WITH STAGE 3A CHRONIC KIDNEY DISEASE, WITHOUT LONG-TERM CURRENT USE OF INSULIN: ICD-10-CM

## 2023-11-13 DIAGNOSIS — N18.31 STAGE 3A CHRONIC KIDNEY DISEASE: Chronic | ICD-10-CM

## 2023-11-13 DIAGNOSIS — N25.81 SECONDARY HYPERPARATHYROIDISM OF RENAL ORIGIN: ICD-10-CM

## 2023-11-13 DIAGNOSIS — E44.0 PROTEIN-CALORIE MALNUTRITION, MODERATE: ICD-10-CM

## 2023-11-13 DIAGNOSIS — I15.2 HYPERTENSION ASSOCIATED WITH DIABETES: Chronic | ICD-10-CM

## 2023-11-13 DIAGNOSIS — I50.32 CHRONIC DIASTOLIC CONGESTIVE HEART FAILURE: ICD-10-CM

## 2023-11-13 DIAGNOSIS — I25.118 CORONARY ARTERY DISEASE INVOLVING NATIVE CORONARY ARTERY OF NATIVE HEART WITH OTHER FORM OF ANGINA PECTORIS: ICD-10-CM

## 2023-11-13 LAB
ALBUMIN SERPL BCP-MCNC: 3.4 G/DL (ref 3.5–5.2)
ANION GAP SERPL CALC-SCNC: 10 MMOL/L (ref 8–16)
BASOPHILS # BLD AUTO: 0.03 K/UL (ref 0–0.2)
BASOPHILS NFR BLD: 0.4 % (ref 0–1.9)
BUN SERPL-MCNC: 35 MG/DL (ref 8–23)
CALCIUM SERPL-MCNC: 9.7 MG/DL (ref 8.7–10.5)
CHLORIDE SERPL-SCNC: 103 MMOL/L (ref 95–110)
CO2 SERPL-SCNC: 26 MMOL/L (ref 23–29)
CREAT SERPL-MCNC: 1.5 MG/DL (ref 0.5–1.4)
DIFFERENTIAL METHOD: ABNORMAL
EOSINOPHIL # BLD AUTO: 0.2 K/UL (ref 0–0.5)
EOSINOPHIL NFR BLD: 3 % (ref 0–8)
ERYTHROCYTE [DISTWIDTH] IN BLOOD BY AUTOMATED COUNT: 16.4 % (ref 11.5–14.5)
EST. GFR  (NO RACE VARIABLE): 35.7 ML/MIN/1.73 M^2
GLUCOSE SERPL-MCNC: 301 MG/DL (ref 70–110)
HCT VFR BLD AUTO: 32.6 % (ref 37–48.5)
HGB BLD-MCNC: 10.2 G/DL (ref 12–16)
IMM GRANULOCYTES # BLD AUTO: 0.03 K/UL (ref 0–0.04)
IMM GRANULOCYTES NFR BLD AUTO: 0.4 % (ref 0–0.5)
LYMPHOCYTES # BLD AUTO: 2 K/UL (ref 1–4.8)
LYMPHOCYTES NFR BLD: 26.6 % (ref 18–48)
MCH RBC QN AUTO: 27.9 PG (ref 27–31)
MCHC RBC AUTO-ENTMCNC: 31.3 G/DL (ref 32–36)
MCV RBC AUTO: 89 FL (ref 82–98)
MONOCYTES # BLD AUTO: 0.5 K/UL (ref 0.3–1)
MONOCYTES NFR BLD: 6.7 % (ref 4–15)
NEUTROPHILS # BLD AUTO: 4.7 K/UL (ref 1.8–7.7)
NEUTROPHILS NFR BLD: 62.9 % (ref 38–73)
NRBC BLD-RTO: 1 /100 WBC
PHOSPHATE SERPL-MCNC: 3.7 MG/DL (ref 2.7–4.5)
PLATELET # BLD AUTO: 200 K/UL (ref 150–450)
PMV BLD AUTO: 10.6 FL (ref 9.2–12.9)
POTASSIUM SERPL-SCNC: 4 MMOL/L (ref 3.5–5.1)
PTH-INTACT SERPL-MCNC: 68.3 PG/ML (ref 9–77)
RBC # BLD AUTO: 3.65 M/UL (ref 4–5.4)
SODIUM SERPL-SCNC: 139 MMOL/L (ref 136–145)
URATE SERPL-MCNC: 8 MG/DL (ref 2.4–5.7)
WBC # BLD AUTO: 7.44 K/UL (ref 3.9–12.7)

## 2023-11-13 PROCEDURE — 83970 ASSAY OF PARATHORMONE: CPT | Performed by: INTERNAL MEDICINE

## 2023-11-13 PROCEDURE — 36415 COLL VENOUS BLD VENIPUNCTURE: CPT | Mod: PO | Performed by: INTERNAL MEDICINE

## 2023-11-13 PROCEDURE — 85025 COMPLETE CBC W/AUTO DIFF WBC: CPT | Performed by: INTERNAL MEDICINE

## 2023-11-13 PROCEDURE — 84550 ASSAY OF BLOOD/URIC ACID: CPT | Performed by: INTERNAL MEDICINE

## 2023-11-13 PROCEDURE — 80069 RENAL FUNCTION PANEL: CPT | Performed by: INTERNAL MEDICINE

## 2023-11-20 ENCOUNTER — OFFICE VISIT (OUTPATIENT)
Dept: NEPHROLOGY | Facility: CLINIC | Age: 78
End: 2023-11-20
Payer: MEDICARE

## 2023-11-20 VITALS
WEIGHT: 132.94 LBS | HEART RATE: 71 BPM | OXYGEN SATURATION: 97 % | HEIGHT: 69 IN | SYSTOLIC BLOOD PRESSURE: 158 MMHG | BODY MASS INDEX: 19.69 KG/M2 | DIASTOLIC BLOOD PRESSURE: 78 MMHG

## 2023-11-20 DIAGNOSIS — N25.81 SECONDARY HYPERPARATHYROIDISM OF RENAL ORIGIN: ICD-10-CM

## 2023-11-20 DIAGNOSIS — I25.118 CORONARY ARTERY DISEASE INVOLVING NATIVE CORONARY ARTERY OF NATIVE HEART WITH OTHER FORM OF ANGINA PECTORIS: ICD-10-CM

## 2023-11-20 DIAGNOSIS — E11.59 HYPERTENSION ASSOCIATED WITH DIABETES: Primary | Chronic | ICD-10-CM

## 2023-11-20 DIAGNOSIS — I15.2 HYPERTENSION ASSOCIATED WITH DIABETES: Primary | Chronic | ICD-10-CM

## 2023-11-20 DIAGNOSIS — I50.32 CHRONIC DIASTOLIC CONGESTIVE HEART FAILURE: ICD-10-CM

## 2023-11-20 DIAGNOSIS — N18.32 STAGE 3B CHRONIC KIDNEY DISEASE: ICD-10-CM

## 2023-11-20 PROCEDURE — 99214 OFFICE O/P EST MOD 30 MIN: CPT | Mod: S$GLB,,, | Performed by: INTERNAL MEDICINE

## 2023-11-20 PROCEDURE — 99999 PR PBB SHADOW E&M-EST. PATIENT-LVL IV: ICD-10-PCS | Mod: PBBFAC,,, | Performed by: INTERNAL MEDICINE

## 2023-11-20 PROCEDURE — 1159F PR MEDICATION LIST DOCUMENTED IN MEDICAL RECORD: ICD-10-PCS | Mod: CPTII,S$GLB,, | Performed by: INTERNAL MEDICINE

## 2023-11-20 PROCEDURE — 3288F PR FALLS RISK ASSESSMENT DOCUMENTED: ICD-10-PCS | Mod: CPTII,S$GLB,, | Performed by: INTERNAL MEDICINE

## 2023-11-20 PROCEDURE — 1101F PT FALLS ASSESS-DOCD LE1/YR: CPT | Mod: CPTII,S$GLB,, | Performed by: INTERNAL MEDICINE

## 2023-11-20 PROCEDURE — 3077F PR MOST RECENT SYSTOLIC BLOOD PRESSURE >= 140 MM HG: ICD-10-PCS | Mod: CPTII,S$GLB,, | Performed by: INTERNAL MEDICINE

## 2023-11-20 PROCEDURE — 1160F PR REVIEW ALL MEDS BY PRESCRIBER/CLIN PHARMACIST DOCUMENTED: ICD-10-PCS | Mod: CPTII,S$GLB,, | Performed by: INTERNAL MEDICINE

## 2023-11-20 PROCEDURE — 1160F RVW MEDS BY RX/DR IN RCRD: CPT | Mod: CPTII,S$GLB,, | Performed by: INTERNAL MEDICINE

## 2023-11-20 PROCEDURE — 99214 PR OFFICE/OUTPT VISIT, EST, LEVL IV, 30-39 MIN: ICD-10-PCS | Mod: S$GLB,,, | Performed by: INTERNAL MEDICINE

## 2023-11-20 PROCEDURE — 99999 PR PBB SHADOW E&M-EST. PATIENT-LVL IV: CPT | Mod: PBBFAC,,, | Performed by: INTERNAL MEDICINE

## 2023-11-20 PROCEDURE — 3288F FALL RISK ASSESSMENT DOCD: CPT | Mod: CPTII,S$GLB,, | Performed by: INTERNAL MEDICINE

## 2023-11-20 PROCEDURE — 1159F MED LIST DOCD IN RCRD: CPT | Mod: CPTII,S$GLB,, | Performed by: INTERNAL MEDICINE

## 2023-11-20 PROCEDURE — 3078F PR MOST RECENT DIASTOLIC BLOOD PRESSURE < 80 MM HG: ICD-10-PCS | Mod: CPTII,S$GLB,, | Performed by: INTERNAL MEDICINE

## 2023-11-20 PROCEDURE — 1101F PR PT FALLS ASSESS DOC 0-1 FALLS W/OUT INJ PAST YR: ICD-10-PCS | Mod: CPTII,S$GLB,, | Performed by: INTERNAL MEDICINE

## 2023-11-20 PROCEDURE — 3077F SYST BP >= 140 MM HG: CPT | Mod: CPTII,S$GLB,, | Performed by: INTERNAL MEDICINE

## 2023-11-20 PROCEDURE — 3078F DIAST BP <80 MM HG: CPT | Mod: CPTII,S$GLB,, | Performed by: INTERNAL MEDICINE

## 2023-11-20 NOTE — PROGRESS NOTES
"Subjective:       Patient ID: Marianne Cali is a 78 y.o. Black or  female who presents for follow up on CKD.     Since last seen at nephrology clinic, Marianne Cali denies new medications or hospitalizations. No urine.  No uremic symptoms, not volume overloaded.    Reports taking their medications on time, without missed doses. As to their chronic conditions:  - CKD: Denies use of NSAIDs.   [id number]: baseline sr cr of 1 - 1.5 mg/dL with no proteinuria, Renal US R 10.4 cm and L 10.9 cm  - Dmt2: pt reports poor glycemic control.  - HTN: well controlled, follows low salt diet. Denies uncontrolled HTN, dizziness/lightheadedness or hypotension/syncopal episodes.    Review of Systems   Constitutional:  Negative for chills, fatigue and fever.   HENT:  Negative for hearing loss, trouble swallowing and voice change.    Respiratory:  Negative for cough, shortness of breath and wheezing.    Cardiovascular:  Negative for chest pain, palpitations and leg swelling.   Gastrointestinal:  Negative for abdominal distention, abdominal pain, constipation and diarrhea.   Endocrine: Negative for polydipsia, polyphagia and polyuria.   Genitourinary:  Negative for dysuria, flank pain, frequency and hematuria.   Musculoskeletal:  Positive for arthralgias, gait problem and joint swelling. Negative for back pain and myalgias.   Skin:  Negative for rash and wound.   Neurological:  Negative for dizziness, syncope, weakness and light-headedness.   Hematological:  Negative for adenopathy. Does not bruise/bleed easily.       The past medical, family and social histories were reviewed for this encounter.     Ht 5' 9" (1.753 m)   Wt 60.3 kg (132 lb 15 oz)   BMI 19.63 kg/m²     Objective:      Physical Exam  Vitals and nursing note reviewed.   Constitutional:       Appearance: Normal appearance. She is not ill-appearing.   HENT:      Head: Normocephalic and atraumatic.      Mouth/Throat:      Mouth: Mucous membranes are moist.      " Pharynx: Oropharynx is clear.   Eyes:      Extraocular Movements: Extraocular movements intact.      Conjunctiva/sclera: Conjunctivae normal.   Neck:      Vascular: No carotid bruit.   Cardiovascular:      Rate and Rhythm: Normal rate and regular rhythm.      Heart sounds: Normal heart sounds.   Pulmonary:      Effort: Pulmonary effort is normal. No respiratory distress.      Breath sounds: Normal breath sounds.   Abdominal:      General: Bowel sounds are normal. There is no distension.      Palpations: Abdomen is soft.      Tenderness: There is no abdominal tenderness.   Musculoskeletal:         General: Normal range of motion.      Cervical back: Normal range of motion. No tenderness.   Lymphadenopathy:      Cervical: No cervical adenopathy.   Skin:     General: Skin is warm and dry.      Capillary Refill: Capillary refill takes less than 2 seconds.      Coloration: Skin is not jaundiced.   Neurological:      General: No focal deficit present.      Mental Status: She is alert. She is disoriented.   Psychiatric:         Mood and Affect: Mood normal.         Behavior: Behavior normal.         Judgment: Judgment normal.         Assessment:       1. Hypertension associated with diabetes    2. Coronary artery disease involving native coronary artery of native heart with other form of angina pectoris    3. Chronic diastolic congestive heart failure    4. Secondary hyperparathyroidism of renal origin    5. Stage 3b chronic kidney disease        Plan:   Return to clinic in 6 months    CKD stage G3aA1 in a setting of DM and HTN  Baseline creatinine is 1.1 - 1.3 mg/dL and no proteinuria  Lab Results   Component Value Date    CREATININE 1.5 (H) 11/13/2023      - Euvolemic   - Pt understands importance of blood pressure and glycemic control and is aware that uncontrolled HTN and DM leads to progression of CKD   - Complete avoidance of NSAIDs   - Low salt diet with < 2000 mg/day   - Dose adjust medications to GFR of 45-60   -  Avoid nephrotoxins including IV contrast    HTN   - BP controlled at home: continue current medications, avoid hypotension and dehydration    DM   - DM without diabetic retinopathy: mod controlled with most recent HgbA1c of 8.1%, continue yearly optho checks    SHPT  Lab Results   Component Value Date    PTH 68.3 11/13/2023    CALCIUM 9.7 11/13/2023    PHOS 3.7 11/13/2023    - Start Vit D    CAD   - Ok to take ASA 81 mg     Anemia   - Stable    HF   - not on lasix   - Salt restriction    Med changes:   Take gabapentin 100 mg (1 pill) in the morning and gabapentin 200 mg (2 pills) at night  Stop Naproxen

## 2024-01-11 ENCOUNTER — NURSE TRIAGE (OUTPATIENT)
Dept: ADMINISTRATIVE | Facility: CLINIC | Age: 79
End: 2024-01-11
Payer: MEDICARE

## 2024-01-11 NOTE — TELEPHONE ENCOUNTER
Pt's friend calling on behalf of patient. Call was dropped while being transferred but upon calling back her friend reports she has called 911 and the fire dept is already on scene. Reports per fire, patients blood glucose is >500. I have contacted friend at [phone].     Reason for Disposition   Patient already left for the hospital/clinic    Protocols used: No Contact or Duplicate Contact Call-A-OH

## 2024-01-15 PROCEDURE — G0180 MD CERTIFICATION HHA PATIENT: HCPCS | Mod: ,,, | Performed by: FAMILY MEDICINE

## 2024-01-17 ENCOUNTER — TELEPHONE (OUTPATIENT)
Dept: FAMILY MEDICINE | Facility: CLINIC | Age: 79
End: 2024-01-17
Payer: MEDICARE

## 2024-01-17 NOTE — TELEPHONE ENCOUNTER
----- Message from Michaela Suárez sent at 1/17/2024  9:53 AM CST -----  Contact: Chemo/Daughter  Type:  Needs Medical Advice    Who Called: Chemo  Symptoms (please be specific): Diabetic/High blood pressure    How long has patient had these symptoms:  Not available   Pharmacy name and phone #:    [facility] - [place], [place] - [address]  [address] [place] [address]  Phone: [phone] Fax: [phone]  Would the patient rather a call back or a response via [facility]? call  Best Call Back Number: [phone]   Additional Information: Reports patient was seen in [facility] on Thursday and Friday and medication has been changed and requesting patient to check blood sugar with monitor and testing strips. Reports concerned regarding medication changes and request assistance.  Thank you,  SELVIN

## 2024-01-19 ENCOUNTER — LAB VISIT (OUTPATIENT)
Dept: LAB | Facility: HOSPITAL | Age: 79
End: 2024-01-19
Attending: FAMILY MEDICINE
Payer: MEDICARE

## 2024-01-19 ENCOUNTER — OFFICE VISIT (OUTPATIENT)
Dept: FAMILY MEDICINE | Facility: CLINIC | Age: 79
End: 2024-01-19
Payer: MEDICARE

## 2024-01-19 ENCOUNTER — TELEPHONE (OUTPATIENT)
Dept: DIABETES | Facility: CLINIC | Age: 79
End: 2024-01-19
Payer: MEDICARE

## 2024-01-19 VITALS
WEIGHT: 135.13 LBS | HEART RATE: 80 BPM | BODY MASS INDEX: 20.01 KG/M2 | SYSTOLIC BLOOD PRESSURE: 120 MMHG | DIASTOLIC BLOOD PRESSURE: 66 MMHG | HEIGHT: 69 IN

## 2024-01-19 DIAGNOSIS — E83.39 HYPOPHOSPHATEMIA: ICD-10-CM

## 2024-01-19 DIAGNOSIS — N25.81 SECONDARY HYPERPARATHYROIDISM OF RENAL ORIGIN: ICD-10-CM

## 2024-01-19 DIAGNOSIS — E44.0 PROTEIN-CALORIE MALNUTRITION, MODERATE: ICD-10-CM

## 2024-01-19 DIAGNOSIS — I25.118 CORONARY ARTERY DISEASE INVOLVING NATIVE CORONARY ARTERY OF NATIVE HEART WITH OTHER FORM OF ANGINA PECTORIS: ICD-10-CM

## 2024-01-19 DIAGNOSIS — F01.B3 MODERATE VASCULAR DEMENTIA WITH MOOD DISTURBANCE: ICD-10-CM

## 2024-01-19 DIAGNOSIS — Z09 HOSPITAL DISCHARGE FOLLOW-UP: Primary | ICD-10-CM

## 2024-01-19 DIAGNOSIS — E11.22 TYPE 2 DIABETES MELLITUS WITH STAGE 3A CHRONIC KIDNEY DISEASE, WITHOUT LONG-TERM CURRENT USE OF INSULIN: ICD-10-CM

## 2024-01-19 DIAGNOSIS — N18.31 TYPE 2 DIABETES MELLITUS WITH STAGE 3A CHRONIC KIDNEY DISEASE, WITHOUT LONG-TERM CURRENT USE OF INSULIN: ICD-10-CM

## 2024-01-19 DIAGNOSIS — Z09 HOSPITAL DISCHARGE FOLLOW-UP: ICD-10-CM

## 2024-01-19 PROBLEM — D69.6 THROMBOCYTOPENIA: Status: RESOLVED | Noted: 2022-07-12 | Resolved: 2024-01-19

## 2024-01-19 LAB — GLUCOSE SERPL-MCNC: 256 MG/DL (ref 70–110)

## 2024-01-19 PROCEDURE — 99999 PR PBB SHADOW E&M-EST. PATIENT-LVL V: CPT | Mod: PBBFAC,,, | Performed by: FAMILY MEDICINE

## 2024-01-19 PROCEDURE — 3074F SYST BP LT 130 MM HG: CPT | Mod: CPTII,S$GLB,, | Performed by: FAMILY MEDICINE

## 2024-01-19 PROCEDURE — 80053 COMPREHEN METABOLIC PANEL: CPT | Performed by: FAMILY MEDICINE

## 2024-01-19 PROCEDURE — 83036 HEMOGLOBIN GLYCOSYLATED A1C: CPT | Performed by: FAMILY MEDICINE

## 2024-01-19 PROCEDURE — 84100 ASSAY OF PHOSPHORUS: CPT | Performed by: FAMILY MEDICINE

## 2024-01-19 PROCEDURE — 82962 GLUCOSE BLOOD TEST: CPT | Mod: S$GLB,,, | Performed by: FAMILY MEDICINE

## 2024-01-19 PROCEDURE — 1126F AMNT PAIN NOTED NONE PRSNT: CPT | Mod: CPTII,S$GLB,, | Performed by: FAMILY MEDICINE

## 2024-01-19 PROCEDURE — 85027 COMPLETE CBC AUTOMATED: CPT | Performed by: FAMILY MEDICINE

## 2024-01-19 PROCEDURE — 36415 COLL VENOUS BLD VENIPUNCTURE: CPT | Mod: PO | Performed by: FAMILY MEDICINE

## 2024-01-19 PROCEDURE — 99215 OFFICE O/P EST HI 40 MIN: CPT | Mod: S$GLB,,, | Performed by: FAMILY MEDICINE

## 2024-01-19 PROCEDURE — 1159F MED LIST DOCD IN RCRD: CPT | Mod: CPTII,S$GLB,, | Performed by: FAMILY MEDICINE

## 2024-01-19 PROCEDURE — 3288F FALL RISK ASSESSMENT DOCD: CPT | Mod: CPTII,S$GLB,, | Performed by: FAMILY MEDICINE

## 2024-01-19 PROCEDURE — 3078F DIAST BP <80 MM HG: CPT | Mod: CPTII,S$GLB,, | Performed by: FAMILY MEDICINE

## 2024-01-19 PROCEDURE — 1101F PT FALLS ASSESS-DOCD LE1/YR: CPT | Mod: CPTII,S$GLB,, | Performed by: FAMILY MEDICINE

## 2024-01-19 PROCEDURE — 84443 ASSAY THYROID STIM HORMONE: CPT | Performed by: FAMILY MEDICINE

## 2024-01-19 PROCEDURE — 1160F RVW MEDS BY RX/DR IN RCRD: CPT | Mod: CPTII,S$GLB,, | Performed by: FAMILY MEDICINE

## 2024-01-19 PROCEDURE — 80061 LIPID PANEL: CPT | Performed by: FAMILY MEDICINE

## 2024-01-19 RX ORDER — INSULIN GLARGINE 100 [IU]/ML
5 INJECTION, SOLUTION SUBCUTANEOUS NIGHTLY
Qty: 4.5 ML | Refills: 3 | Status: SHIPPED | OUTPATIENT
Start: 2024-01-19 | End: 2025-01-18

## 2024-01-19 RX ORDER — SITAGLIPTIN 50 MG/1
50 TABLET, FILM COATED ORAL DAILY
Qty: 90 TABLET | Refills: 4 | Status: SHIPPED | OUTPATIENT
Start: 2024-01-19

## 2024-01-19 RX ORDER — PANTOPRAZOLE SODIUM 40 MG/1
40 TABLET, DELAYED RELEASE ORAL EVERY MORNING
COMMUNITY
Start: 2024-01-15

## 2024-01-19 RX ORDER — METFORMIN HYDROCHLORIDE 1000 MG/1
1000 TABLET ORAL 2 TIMES DAILY WITH MEALS
Qty: 180 TABLET | Refills: 4 | Status: SHIPPED | OUTPATIENT
Start: 2024-01-19 | End: 2024-02-29

## 2024-01-19 RX ORDER — INSULIN GLARGINE 100 [IU]/ML
5 INJECTION, SOLUTION SUBCUTANEOUS
COMMUNITY
Start: 2024-01-14 | End: 2024-01-19

## 2024-01-19 NOTE — ASSESSMENT & PLAN NOTE
Home health ordered.  Patient has testing set up with Cardiology.   Checking labs today.    ER precautions.    Transitional Care Note    Family and/or Caretaker present at visit?  Yes.  Diagnostic tests reviewed/disposition: I have reviewed all completed as well as pending diagnostic tests at the time of discharge.  Disease/illness education:  Hyperglycemia with dehydration  Home health/community services discussion/referrals: Patient has home health established at Home .   Establishment or re-establishment of referral orders for community resources: No other necessary community resources.   Discussion with other health care providers: No discussion with other health care providers necessary.

## 2024-01-19 NOTE — ASSESSMENT & PLAN NOTE
Monitoring protein levels.  Increase protein supplementation.  Start physical therapy through home health.

## 2024-01-19 NOTE — ASSESSMENT & PLAN NOTE
Suspect noncompliance or poor understanding of her diabetes regimen.  I recommend that she start on Lantus daily for better glucose control.  Restart metformin and Januvia.  Avoid glipizide due to history of hypoglycemia.  Referral to Diabetes Management to help with nutrition, medication management and continuous glucose monitoring which I believe will help the patient tremendously.  Discuss reducing risk of diabetes and its complications.  Reduce hospital admissions.  We will plan to monitor hemoglobin A1c at designated intervals 3 to 6 months.  I recommend ongoing Education for diabetic diet and exercise protocol.  We will continue to monitor for side effects.    Please be advised of symptoms to monitor for and to notify me immediately if persistent or worsening.  Follow up with Ophthalmology/Optometry and Podiatry at least annually.  Home health has been ordered for skilled nursing to help with diabetes also.

## 2024-01-19 NOTE — ASSESSMENT & PLAN NOTE
Consider restarting medication to help with memory.  Patient will need assistance at home given her uncontrolled diabetes.  Ordering home health.  Patient may need 24 hour assistance if worsening.     Follow-up with Neurology.

## 2024-01-19 NOTE — PATIENT INSTRUCTIONS
Estuardo Lopes,     If you are due for any health screening(s) below please notify me so we can arrange them to be ordered and scheduled. Most healthy patients at your age complete them, but you are free to accept or refuse.     If you can't do it, I'll definitely understand. If you can, I'd certainly appreciate it!    Tests to Keep You Healthy    Eye Exam: DUE  Last Blood Pressure <= 139/89 (1/19/2024): NO      Lets manage your A1c levels     Your last hemoglobin A1c was higher than the goal of less than 8. Hemoglobin A1c or HbA1c is a blood test that measures your average blood sugar levels over the past 3 months. It is the main test to help you and your health care team manage your diabetes.     Higher A1c levels are linked to diabetes complications, such as loss of vision, kidney disease, and nerve damage. Keeping your A1c at least less than 8 is important to stay healthy and we are here to help. Talk with your provider on how you can further improve your A1c.     We recommend that you set up blood work to get a repeat hemoglobin A1c in 3 months to monitor your diabetes. Let your health care team know if you have questions.    Your diabetic retinal eye exam is due     Diabetes is the #1 cause of blindness in the [place] - early detection before signs or symptoms develop can prevent debilitating blindness.     Our records indicate that you may be overdue for your annual diabetic eye exam. Eye screening can help identify patients at risk for developing vision loss which is common in diabetes. This simple screening is an important step to keeping you healthy and preventing complications from diabetes.     This recommended diabetic eye exam should take place once per year and can prevent and treat diabetes complications in the eye before developing symptoms. This can be done with a special camera is used to take photographs of the back of your eye without having to dilate them, or you can see an eye doctor for a full dilated  exam.     If you recently had your yearly diabetic eye exam performed outside of [facility], please let your Health care team know so that they can update your health record.

## 2024-01-19 NOTE — PROGRESS NOTES
PLAN:      Problem List Items Addressed This Visit       Coronary artery disease involving native coronary artery of native heart with other form of angina pectoris (Chronic)     Requesting records.  Follow-up with Cardiology as soon as possible.  ER precautions for severe symptoms.         Relevant Orders    CBC Without Differential    Comprehensive Metabolic Panel    TSH    Hemoglobin A1C    Lipid Panel    Urinalysis    PHOSPHORUS    POCT Glucose, Hand-Held Device (Completed)    Protein-calorie malnutrition, moderate (Chronic)     Monitoring protein levels.  Increase protein supplementation.  Start physical therapy through home health.         Relevant Orders    CBC Without Differential    Comprehensive Metabolic Panel    TSH    Hemoglobin A1C    Lipid Panel    Urinalysis    PHOSPHORUS    POCT Glucose, Hand-Held Device (Completed)    Type 2 diabetes mellitus (Chronic)     Suspect noncompliance or poor understanding of her diabetes regimen.  I recommend that she start on Lantus daily for better glucose control.  Restart metformin and Januvia.  Avoid glipizide due to history of hypoglycemia.  Referral to Diabetes Management to help with nutrition, medication management and continuous glucose monitoring which I believe will help the patient tremendously.  Discuss reducing risk of diabetes and its complications.  Reduce hospital admissions.  We will plan to monitor hemoglobin A1c at designated intervals 3 to 6 months.  I recommend ongoing Education for diabetic diet and exercise protocol.  We will continue to monitor for side effects.    Please be advised of symptoms to monitor for and to notify me immediately if persistent or worsening.  Follow up with Ophthalmology/Optometry and Podiatry at least annually.  Home health has been ordered for skilled nursing to help with diabetes also.         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  Patient will need assistance at home given her uncontrolled diabetes.  Ordering home health.  Patient may need 24 hour assistance if worsening.     Follow-up with Neurology.         Relevant Orders    CBC Without Differential    Comprehensive Metabolic Panel    TSH    Hemoglobin A1C    Lipid Panel    Urinalysis    PHOSPHORUS    POCT Glucose, Hand-Held Device (Completed)    Ambulatory referral/consult to Home Health    Secondary hyperparathyroidism of renal origin (Chronic)     Follow-up with Nephrology.  Monitoring levels.         Relevant Orders    CBC Without Differential    Comprehensive Metabolic Panel    TSH    Hemoglobin A1C    Lipid Panel    Urinalysis    PHOSPHORUS    POCT Glucose, Hand-Held Device (Completed)    Hospital discharge follow-up - Primary     Home health ordered.  Patient has testing set up with Cardiology.   Checking labs today.    ER precautions.    Transitional Care Note    Family and/or Caretaker present at visit?  Yes.  Diagnostic tests reviewed/disposition: I have reviewed all completed as well as pending diagnostic tests at the time of discharge.  Disease/illness education:  Hyperglycemia with dehydration  Home health/community services discussion/referrals: Patient has home health established at Home .   Establishment or re-establishment of referral orders for community resources: No other necessary community resources.   Discussion with other health care providers: No discussion with other health care  providers necessary.                  Relevant Orders    CBC Without Differential    Comprehensive Metabolic Panel    TSH    Hemoglobin A1C    Lipid Panel    Urinalysis    PHOSPHORUS    POCT Glucose, Hand-Held Device (Completed)    Ambulatory referral/consult to Home Health    Hypophosphatemia     Follow-up with Nephrology.  Monitor level.         Relevant Orders    CBC Without Differential    Comprehensive Metabolic Panel    TSH    Hemoglobin A1C    Lipid Panel    Urinalysis    PHOSPHORUS    POCT Glucose, Hand-Held Device (Completed)     Future Appointments       Date Provider Specialty Appt Notes    1/19/2024  Lab     1/25/2024 Mary King NP [facility] follow up    2/29/2024 Jo Ann Haynes FNP Diabetes est care    5/20/2024  Lab sarah           Medication Management for assessment above:   Medication List with Changes/Refills   New Medications    INSULIN (LANTUS SOLOSTAR U-100 INSULIN) GLARGINE 100 UNITS/ML SUBQ PEN    Inject 5 Units into the skin every evening.   Current Medications    ACCU-CHEK AMELIA PLUS METER MISC    Use 1 device  three times a day    ACCU-CHEK AMELIA PLUS TEST STRP STRP    Inject 1 strip into the skin 3 (three) times daily.    AMLODIPINE (NORVASC) 5 MG TABLET    Take 5 mg by mouth.    ASPIRIN (ECOTRIN) 81 MG EC TABLET    Take 1 tablet (81 mg total) by mouth once. for 1 dose    ATORVASTATIN (LIPITOR) 80 MG TABLET    Take 1 tablet (80 mg total) by mouth once daily.    CARVEDILOL (COREG) 3.125 MG TABLET    TAKE 1 TABLET BY MOUTH EVERY MORNING and ONE IN THE EVENING WITH A MEAL    CHOLECALCIFEROL, VITAMIN D3, (VITAMIN D3) 25 MCG (1,000 UNIT) CAPSULE    Take 1 capsule (1,000 Units total) by mouth once daily.    DICLOFENAC SODIUM (VOLTAREN) 1 % GEL    Apply topically.    ESTRADIOL (ESTRACE) 0.01 % (0.1 MG/GRAM) VAGINAL CREAM    Place 1 g vaginally 3 (three) times a week.    IRBESARTAN-HYDROCHLOROTHIAZIDE (AVALIDE) 300-12.5 MG PER TABLET    irbesartan 300 mg-hydrochlorothiazide  12.5 mg tablet   TAKE 1 TABLET BY MOUTH EVERY DAY    ISOSORBIDE MONONITRATE (IMDUR) 30 MG 24 HR TABLET    TAKE 1 TABLET BY MOUTH ONCE DAILY    PANTOPRAZOLE (PROTONIX) 40 MG TABLET    Take 40 mg by mouth every morning.    VIBEGRON (GEMTESA) 75 MG TAB    Take by mouth.   Changed and/or Refilled Medications    Modified Medication Previous Medication    JANUVIA 50 MG TAB JANUVIA 50 mg Tab       Take 1 tablet (50 mg total) by mouth once daily.    Take 1 tablet (50 mg total) by mouth once daily.    METFORMIN (GLUCOPHAGE) 1000 MG TABLET metFORMIN (GLUCOPHAGE) 1000 MG tablet       Take 1 tablet (1,000 mg total) by mouth 2 (two) times daily with meals.    Take 1,000 mg by mouth 2 (two) times daily.   Discontinued Medications    CEPHALEXIN (KEFLEX) 500 MG CAPSULE    Take 1 capsule (500 mg total) by mouth every 12 (twelve) hours.    DONEPEZIL HCL (DONEPEZIL ORAL)    donepezil Take No date recorded No form recorded No frequency recorded No route recorded No set duration recorded No set duration amount recorded active No dosage strength recorded No dosage strength units of measure recorded    FESOTERODINE (TOVIAZ) 4 MG TB24    TAKE 1 TABLET BY MOUTH EVERY DAY    GABAPENTIN (NEURONTIN) 100 MG CAPSULE    Take 300 mg by mouth nightly.    GLIPIZIDE (GLUCOTROL) 10 MG TR24    Take 1 tablet by mouth 2 (two) times daily.    INSULIN GLARGINE (LANTUS) 100 UNIT/ML INJECTION    Inject 5 Units into the skin.    LANCETS, SUPER THIN MISC    TEST BLOOD SUGAR THREE TIMES DAILY AS DIRECTED    LIDOCAINE (LIDODERM) 5 %    SMARTSIG:Topical    METRONIDAZOLE (FLAGYL) 500 MG TABLET        ONDANSETRON (ZOFRAN) 4 MG TABLET    Take 1 tablet (4 mg total) by mouth every 8 (eight) hours as needed for Nausea.    SEMAGLUTIDE (OZEMPIC) 1 MG/DOSE (4 MG/3 ML)    Inject 1 mg into the skin every 7 days.       Antoni Patel M.D.  ==========================================================================  Subjective:   Patient ID: Marianne Cali is a 78 y.o.  female.  has a past medical history of Allergy, Cataract, Dementia, Depression, Diabetes mellitus, type 2, Heart murmur, Hypertension, and Stroke.   Chief Complaint: Follow-up (From hospital)      Problem List Items Addressed This Visit       Coronary artery disease involving native coronary artery of native heart with other form of angina pectoris (Chronic)    Overview     Chronic.  Control is uncertain.  Patient follows with Cardiology outside of [facility].  Recent hospitalization.  Apparently followed by Cardiology during hospital stay but was told to follow-up closely outpatient.    Hypertension Medications               amLODIPine (NORVASC) 5 MG tablet Take 5 mg by mouth.    irbesartan-hydrochlorothiazide (AVALIDE) 300-12.5 mg per tablet irbesartan 300 mg-hydrochlorothiazide 12.5 mg tablet   TAKE 1 TABLET BY MOUTH EVERY DAY    carvediloL (COREG) 3.125 MG tablet TAKE 1 TABLET BY MOUTH EVERY MORNING and ONE IN THE EVENING WITH A MEAL    isosorbide mononitrate (IMDUR) 30 MG 24 hr tablet TAKE 1 TABLET BY MOUTH ONCE DAILY                 Current Assessment & Plan     Requesting records.  Follow-up with Cardiology as soon as possible.  ER precautions for severe symptoms.         Protein-calorie malnutrition, moderate (Chronic)    Overview     Chronic.  Intermittent control.  Patient is currently having episode of weakness status post hospitalization.  She is getting home health.     Latest Reference Range & Units 01/14/24 04:28   Albumin 3.5 - 4.8 g/dL 3.0 (L) (E)   (L): Data is abnormally low  (E): External lab result         Current Assessment & Plan     Monitoring protein levels.  Increase protein supplementation.  Start physical therapy through home health.         Type 2 diabetes mellitus (Chronic)    Overview     January 2024:  Patient here with her son and .  They are very concerned about her diabetes medications.  Patient reports that she has not currently taking any medications for diabetes at home.   She was previously on Ozempic but hospitalization recently told her to stop taking the medication.  Recent A1c was 11 or higher.  Blood sugar today in clinic is 247.    Diabetes Medications               insulin (LANTUS SOLOSTAR U-100 INSULIN) glargine 100 units/mL SubQ pen Inject 5 Units into the skin every evening.    JANUVIA 50 mg Tab Take 1 tablet (50 mg total) by mouth once daily.    metFORMIN (GLUCOPHAGE) 1000 MG tablet Take 1 tablet (1,000 mg total) by mouth 2 (two) times daily with meals.            August 2023: Chronic. Stable. She has missed a few doses of Ozempic. Reports no longer taking Metformin. Due for labs.   July 2022:  Patient with recent hospitalization.  Patient reports that she is taking the Ozempic 0.25 milligrams.  She is having some nausea but otherwise has been better controlled.  Patient is due for A1c.April 2022:  Patient with uncontrolled A1c from last labs performed [place].  Currently on metformin 1000 milligrams twice daily, Januvia 100 milligrams, glipizide 10 milligrams twice daily.  Noncompliance with diet.  Denies history of pancreatitis, thyroid cancer, MEN syndrome.    Diabetes Management Status    Statin: Taking  ACE/ARB: Taking    Screening or Prevention Patient's value Goal Complete/Controlled?   HgA1C Testing and Control   Lab Results   Component Value Date    HGBA1C 11.6 (H) 01/13/2024      Annually/Less than 8% No   Lipid profile : 06/30/2023 Annually Yes   LDL control Lab Results   Component Value Date    LDLCALC 105 06/30/2023    Annually/Less than 100 mg/dl  No   Nephropathy screening Lab Results   Component Value Date    LABMICR 55.0 07/22/2022     Lab Results   Component Value Date    PROTEINUA 1+ (A) 08/28/2023     Lab Results   Component Value Date    UTPCR 0.12 04/26/2023      Annually Yes   Blood pressure BP Readings from Last 1 Encounters:   01/19/24 120/66    Less than 140/90 Yes   Dilated retinal exam Most Recent Eye Exam Date: Not Found Annually No   Foot  exam   Most Recent Foot Exam Date: Not Found Annually No            Current Assessment & Plan     Suspect noncompliance or poor understanding of her diabetes regimen.  I recommend that she start on Lantus daily for better glucose control.  Restart metformin and Januvia.  Avoid glipizide due to history of hypoglycemia.  Referral to Diabetes Management to help with nutrition, medication management and continuous glucose monitoring which I believe will help the patient tremendously.  Discuss reducing risk of diabetes and its complications.  Reduce hospital admissions.  We will plan to monitor hemoglobin A1c at designated intervals 3 to 6 months.  I recommend ongoing Education for diabetic diet and exercise protocol.  We will continue to monitor for side effects.    Please be advised of symptoms to monitor for and to notify me immediately if persistent or worsening.  Follow up with Ophthalmology/Optometry and Podiatry at least annually.  Home health has been ordered for skilled nursing to help with diabetes also.         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  Control is uncertain.  Patient was previously taking donepezil.  She has seen Neurology in the past.  Donepezil is no longer on the medication list at this time.         Current Assessment & Plan     Consider restarting medication to help with memory.  Patient will need assistance at home given her uncontrolled diabetes.  Ordering home health.  Patient may need 24 hour assistance if worsening.     Follow-up with Neurology.         Secondary hyperparathyroidism of renal origin (Chronic)    Overview     Lab Results   Component Value Date    PTH 68.3 11/13/2023    CALCIUM 8.6 (L) 01/14/2024    PHOS 3.7 11/13/2023            Current Assessment & Plan     Follow-up with Nephrology.  Monitoring levels.         Hospital discharge follow-up - Primary    Overview     [facility] DISCHARGE SUMMARY    Patient ID:  Marianne  Viraj  [id number]  78 y.o.  1945    Admit Date:  1/12/2024 9:59 PM    Discharge Date:  Discharge Today: 1/14/2024    Admitting Physician:  Tammy Medina MD    Discharge Physician:  Tammy Medina MD    Reason for Admission/Admission Diagnoses:  Present on Admission:  Type 2 diabetes mellitus with hyperglycemia, unspecified whether long term insulin use (HCC)  Asthma  BEBETO (acute kidney injury) ([facility])  Normocytic anemia  Vascular dementia without behavioral disturbance, psychotic disturbance, mood disturbance, or anxiety (HCC)  Coronary artery disease involving native coronary artery of native heart without angina pectoris    Discharge Diagnoses:  Active Hospital Problems  Diagnosis Date Noted  Type 2 diabetes mellitus with hyperglycemia, unspecified whether long term insulin use (HCC) 01/13/2024  Asthma 08/23/2023  Coronary artery disease involving native coronary artery of native heart without angina pectoris 12/25/2022  Normocytic anemia 07/12/2022  Vascular dementia without behavioral disturbance, psychotic disturbance, mood disturbance, or anxiety (HCC) 07/03/2022  BEBETO (acute kidney injury) ([facility]) 12/15/2019    Resolved Hospital Problems    History of Present Illness:  This is a 78-year-old -American female comes in with syncopal episodes and hyperglycemia. Patient admitted and hydrated gently for BEBETO which resolved. IV fluids DC'd. Patient has poor compliance with oral diet at home per son. Dietary compliance for diabetic control encouraged. Cardiology consulted for syncopal episodes. Bilateral carotid ultrasound was benign. Echocardiogram ordered. EKG and cardiac enzymes were benign. Outpatient Holter monitor recommended by cardiology. Echocardiogram was canceled by cardiology and recommended outpatient follow-up. PT OT ordered.  consulted for discharge planning. Patient advised to keep a log of CBGs at home to take to PCP and follow-up with us. Over the course of her stay she is now  clinically proved and hemodynamically stable for discharge    Physical exam:  General Appearance: Comfortable and in no acute distress.  HEENT: Normal HEENT exam.  Lungs: Normal effort and normal respiratory rate. Breath sounds clear to auscultation. He is not in respiratory distress. No stridor. No rales, decreased breath sounds, wheezes or rhonchi.  Heart: Normal rate. Regular rhythm. S1 normal and S2 normal. No murmur, gallop or friction rub.  Chest: Symmetric chest wall expansion.  Abdomen: Abdomen is soft. Bowel sounds are normal. No tenderness or guarding. There is no mass.  Extremities: Normal range of motion. There is no deformity.  Pulses: Distal pulses are intact.  Neurological: Patient is alert and oriented to person, place and time. (No focal neuro deficits).  Pupils: Pupils are equal, round, and reactive to light.  Skin: Warm and dry.    Discharge diagnosis:    Syncope  -Possibly due to dehydration and hyperglycemia  - Patient reports that she has had 2 or 3 episodes of syncope at home  - CT head without contrast on 1/11/2024= no acute abnormality  -EKG on 1/12/2024=Normal sinus rhythm. Left ventricular hypertrophy with repolarization abnormality  - UA on 1/13/2024= no indication of UTI  - Bilateral carotid ultrasound on 1/13/2024= benign  - Echocardiogram on 1/14/2024= canceled by cardiology. Can be followed up outpatient  - Neurochecks  - Cardiology consulted= follow recommendations  - Outpatient Holter monitor per cardiology    Acute kidney injury due to dehydration  - Monitor creatinine= improved/resolved  - DC'd IV fluids    Hypophosphatemia  - Monitor and replace as needed  - Phos-Nak 1 tablet p.o. twice daily x 1 day. Ordered on 1/14/2024    Normocytic anemia of chronic disease  - No overt bleeding seen or reported  - Monitor H&H= stable range with some decrease (due to dilutional effect of IV fluids)  - FOBT= pending  - PPI daily  - Consider transfusion 1 unit PRBC if and when hemoglobin less  than 7    Chronic diastolic heart failure, without exacerbation  - Previous echo on 8/24/2023= EF 60 to 65%  - Continue beta-blocker    History of coronary disease  - Stable  - Continue aspirin statin beta-blocker    DM2 with hyperglycemia  - Monitor CBGs= stable range currently  - Hemoglobin A1c on 1/13/2024= 11.6 (indicative of poor long-term glycemic control)  -Diabetic education  -Cover with insulin sliding scale protocol/monitor CBGs  -Hold any oral diabetic medications while inpatient  -Patient advised to keep a log of CBGs at home to take to PCP on follow-up visits  -Lantus 5 units SQ nightly  -Resume home Januvia on discharge    Essential hypertension  - Monitor blood pressure= stable range  - Cover with hydralazine 10 mg IV.  - Adjust BP medications as needed    Dementia  - Supportive care    Physical deconditioning  - PT OT    Discharge home with home health  Diet= diabetic and cardiac  Activity ad braulio.  Follow-up with PCP and cardiology x 1 week  Total discharge time = 35 minutes    Hospital Course and Treatment:  Admission Information    Date & Time  1/12/2024 Provider  Tammy Medina MD Department  [facility] General Surgery Dept. Phone  [phone]        Allergies as of 1/14/2024    Reactions  Morphine Other (See Comments), Anxiety  HALLUCINATIONS        Patient's Condition On Discharge:  Stable    Discharge Disposition:  Order for Discharge (From admission, onward)    Start Ordered  01/14/24 1207 Diet= cardiac and diabetic Activity ad braulio. Follow-up with cardiology x 1 week Follow-up with PCP x 1 week Discharge Disposition to: Home Health Once  Comments: Diet= cardiac and diabetic    Activity ad braulio.    Follow-up with cardiology x 1 week  Follow-up with PCP x 1 week  Expected Discharge Date: 01/14/24    Question: Discharge Disposition to Answer: Home Health  01/14/24 1206                Discharge Orders:  Follow-up Information    Callihan, Antoni, MD. Schedule an appointment as soon  as possible for a visit in 1 week(s).  Specialty: Family Medicine  Contact information:  [id number] [place] [address]  [phone]          Frederick Kimball MD. Schedule an appointment as soon as possible for a visit in 1 week(s).  Specialty: Cardiology  Contact information:  [id number] [place] [address]  [phone]                    Future Appointments:    Immunizations Administered for This Admission    No immunizations given this admission.          Medication List      START taking these medications    insulin glargine 100 unit/mL Soln injection  Commonly known as: LANTUS  Inject 5 Units into the skin nightly    pantoprazole 40 MG Tbec tablet  Commonly known as: PROTONIX  Take 1 tablet (40 mg total) by mouth every morning before breakfast for 7 days    potassium, sodium phosphates 280-160-250 mg Pwpk  Commonly known as: PHOS-NAK  Take 1 packet by mouth 2 (two) times daily for 1 day          CHANGE how you take these medications    carvediloL 12.5 MG Tab tablet  Commonly known as: COREG  Take 1 tablet (12.5 mg total) by mouth in the morning and 1 tablet (12.5 mg total) in the evening. Take with meals.  What changed:  medication strength  how much to take          CONTINUE taking these medications    amLODIPine 5 MG Tab tablet  Commonly known as: NORVASC  Take 1 tablet (5 mg total) by mouth daily    aspirin 81 MG Chew chewable tablet  Chew 1 tablet (81 mg total) by mouth daily    atorvastatin 80 MG Tab tablet  Commonly known as: LIPITOR    cholecalciferol (vitamin D3) 25 mcg (1,000 unit) Cap capsule    isosorbide mononitrate 30 MG Tb24 24 hr tablet  Commonly known as: IMDUR  Take 1 tablet (30 mg total) by mouth daily    Januvia 50 MG Tab tablet  Generic drug: SITagliptin phosphate    Toviaz 4 mg Tb24  Generic drug: fesoterodine    True Metrix Glucose Meter Misc  Generic drug: blood-glucose meter    True Metrix Glucose Test Strip Strp test strip  Generic drug: blood sugar  diagnostic          STOP taking these medications    cephalexin 500 MG Cap capsule  Commonly known as: Keflex    irbesartan-hydrochlorothiazide 300-12.5 mg Tab per tablet  Commonly known as: AVALIDE    magnesium oxide 400 mg (241.3 mg magnesium) Tab tablet  Commonly known as: MagOx    OZEMPIC SUBQ            Where to Get Your Medications      These medications were sent to [facility] - [facility] - [address] [facility] [address] [facility] [place] [address] [place] [address]    Phone: [phone]  amLODIPine 5 MG Tab tablet  carvediloL 12.5 MG Tab tablet  insulin glargine 100 unit/mL Soln injection  pantoprazole 40 MG Tbec tablet  potassium, sodium phosphates 280-160-250 mg Pwpk      Discharge Orders    Future Labs/Procedures Expected by Expires  Glucometer and/or supplies As directed          Electronically signed by Tammy Medina MD at 01/14/2024 12:08 PM CST          Current Assessment & Plan     Home health ordered.  Patient has testing set up with Cardiology.   Checking labs today.    ER precautions.    Transitional Care Note    Family and/or Caretaker present at visit?  Yes.  Diagnostic tests reviewed/disposition: I have reviewed all completed as well as pending diagnostic tests at the time of discharge.  Disease/illness education:  Hyperglycemia with dehydration  Home health/community services discussion/referrals: Patient has home health established at Home .   Establishment or re-establishment of referral orders for community resources: No other necessary community resources.   Discussion with other health care providers: No discussion with other health care providers necessary.                  Hypophosphatemia    Overview     Patient with recent low phosphorus in the hospital.  She was prescribed supplement.         Current Assessment & Plan     Follow-up with Nephrology.  Monitor level.             Review of patient's allergies indicates:   Allergen Reactions    Morphine Anxiety and Other (See Comments)      Dizziness     Current Outpatient Medications   Medication Instructions    ACCU-CHEK AMELIA PLUS METER Misc Use 1 device  three times a day    ACCU-CHEK AMELIA PLUS TEST STRP Strp 1 strip, Subcutaneous, 3 times daily    amLODIPine (NORVASC) 5 mg, Oral    aspirin (ECOTRIN) 81 mg, Oral, Once    atorvastatin (LIPITOR) 80 mg, Oral, Daily    carvediloL (COREG) 3.125 MG tablet TAKE 1 TABLET BY MOUTH EVERY MORNING and ONE IN THE EVENING WITH A MEAL    cholecalciferol (vitamin D3) (VITAMIN D3) 1,000 Units, Oral, Daily    diclofenac sodium (VOLTAREN) 1 % Gel Topical (Top)    estradioL (ESTRACE) 1 g, Vaginal, Three times weekly    irbesartan-hydrochlorothiazide (AVALIDE) 300-12.5 mg per tablet irbesartan 300 mg-hydrochlorothiazide 12.5 mg tablet   TAKE 1 TABLET BY MOUTH EVERY DAY    isosorbide mononitrate (IMDUR) 30 mg, Oral    JANUVIA 50 mg, Oral, Daily    LANTUS SOLOSTAR U-100 INSULIN 5 Units, Subcutaneous, Nightly    metFORMIN (GLUCOPHAGE) 1,000 mg, Oral, 2 times daily with meals    pantoprazole (PROTONIX) 40 mg, Oral, Every morning    vibegron (GEMTESA) 75 mg Tab Oral      I have reviewed the PMH, social history, FamilyHx, surgical history, allergies and medications documented / confirmed by the patient at the time of this visit.  Review of Systems   Constitutional:  Positive for fatigue. Negative for chills, fever and unexpected weight change.   HENT:  Negative for ear pain and sore throat.    Eyes:  Negative for redness and visual disturbance.   Respiratory:  Negative for cough and shortness of breath.    Cardiovascular:  Negative for chest pain and palpitations.   Gastrointestinal:  Negative for abdominal distention, abdominal pain, blood in stool, constipation, diarrhea, nausea and vomiting.   Genitourinary:  Negative for difficulty urinating and hematuria.   Musculoskeletal:  Positive for arthralgias and gait problem. Negative for myalgias.   Skin:  Negative for rash and wound.   Neurological:  Positive for weakness.  "Negative for headaches.   Psychiatric/Behavioral:  Negative for sleep disturbance. The patient is not nervous/anxious.      Objective:   /66   Pulse 80   Ht 5' 9" (1.753 m)   Wt 61.3 kg (135 lb 1.6 oz)   BMI 19.95 kg/m²   Physical Exam  Vitals and nursing note reviewed.   Constitutional:       General: She is not in acute distress.     Appearance: She is well-developed. She is not ill-appearing, toxic-appearing or diaphoretic.      Comments: Here with son and    HENT:      Head: Normocephalic and atraumatic.      Right Ear: Hearing and external ear normal.      Left Ear: Hearing and external ear normal.      Nose: Nose normal. No rhinorrhea.   Eyes:      General: Lids are normal.      Extraocular Movements: Extraocular movements intact.      Conjunctiva/sclera: Conjunctivae normal.      Pupils: Pupils are equal, round, and reactive to light.   Cardiovascular:      Rate and Rhythm: Normal rate.      Pulses: Normal pulses.   Pulmonary:      Effort: Pulmonary effort is normal. No respiratory distress.      Breath sounds: Normal breath sounds.   Abdominal:      General: Bowel sounds are normal.      Palpations: Abdomen is soft.   Musculoskeletal:         General: Normal range of motion.      Cervical back: Normal range of motion and neck supple.   Skin:     General: Skin is warm and dry.      Capillary Refill: Capillary refill takes less than 2 seconds.      Coloration: Skin is not pale.   Neurological:      General: No focal deficit present.      Mental Status: She is alert and oriented to person, place, and time. Mental status is at baseline. She is not disoriented.      Cranial Nerves: No cranial nerve deficit.      Motor: Weakness present.      Gait: Gait abnormal (In wheelchair today).   Psychiatric:         Attention and Perception: She is attentive.         Mood and Affect: Mood normal. Mood is not anxious or depressed.         Speech: Speech is not rapid and pressured or slurred.         " Behavior: Behavior normal. Behavior is not agitated, aggressive or hyperactive. Behavior is cooperative.         Thought Content: Thought content normal. Thought content is not paranoid or delusional. Thought content does not include homicidal or suicidal ideation. Thought content does not include homicidal or suicidal plan.         Cognition and Memory: Memory is impaired.         Judgment: Judgment normal.       BMI Readings from Last 10 Encounters:   01/19/24 19.95 kg/m²   11/20/23 19.63 kg/m²   09/06/23 19.64 kg/m²   08/28/23 19.64 kg/m²   08/21/23 19.74 kg/m²   07/06/23 20.50 kg/m²   05/15/23 19.05 kg/m²   04/20/23 19.05 kg/m²   04/11/23 17.57 kg/m²   03/09/23 17.68 kg/m²         Assessment:     1. Hospital discharge follow-up    2. Protein-calorie malnutrition, moderate    3. Secondary hyperparathyroidism of renal origin    4. Moderate vascular dementia with mood disturbance    5. Coronary artery disease involving native coronary artery of native heart with other form of angina pectoris    6. Hypophosphatemia    7. Type 2 diabetes mellitus with stage 3a chronic kidney disease, without long-term current use of insulin      MDM:   High medical complexity.  Moderate to high risk.  Total time: 43 minutes.  This includes total time spent on the encounter, which includes face to face time and non-face to face time preparing to see the patient (eg, review of previous medical records, tests), Obtaining and/or reviewing separately obtained history, documenting clinical information in the electronic or other health record, independently interpreting results (not separately reported)/communicating results to the patient/family/caregiver, and/or care coordination (not separately reported).    I have Reviewed and summarized old records.  I have performed thorough medication reconciliation today and discussed risk and benefits of medications.  I have reviewed labs and discussed with patient.  All questions were answered.  I  am requesting old records and will review them once they are available.  Cardiology, [facility]    I have signed for the following orders AND/OR meds.  Orders Placed This Encounter   Procedures    CBC Without Differential     Standing Status:   Future     Number of Occurrences:   1     Standing Expiration Date:   3/19/2025    Comprehensive Metabolic Panel     Standing Status:   Future     Number of Occurrences:   1     Standing Expiration Date:   3/19/2025    TSH     Standing Status:   Future     Number of Occurrences:   1     Standing Expiration Date:   3/19/2025    Hemoglobin A1C     Standing Status:   Future     Number of Occurrences:   1     Standing Expiration Date:   3/19/2025    Lipid Panel     Standing Status:   Future     Number of Occurrences:   1     Standing Expiration Date:   3/19/2025    Urinalysis     Standing Status:   Future     Number of Occurrences:   1     Standing Expiration Date:   3/19/2025     Order Specific Question:   Collection Type     Answer:   Urine, Clean Catch    PHOSPHORUS     Standing Status:   Future     Number of Occurrences:   1     Standing Expiration Date:   3/19/2025    Ambulatory referral/consult to Diabetic Advanced Practice Providers (Medical Management)     Standing Status:   Future     Standing Expiration Date:   2/19/2025     Referral Priority:   Routine     Referral Type:   Consultation     Referral Reason:   Specialty Services Required     Referred to Provider:   Jo Ann Haynes FNP     Number of Visits Requested:   1    Ambulatory referral/consult to Home Health     Standing Status:   Future     Standing Expiration Date:   2/19/2025     Referral Priority:   Urgent     Referral Type:   Home Health     Referral Reason:   Specialty Services Required     Requested Specialty:   Home Health Services     Number of Visits Requested:   1    POCT Glucose, Hand-Held Device     Medications Ordered This Encounter   Medications    insulin (LANTUS SOLOSTAR  U-100 INSULIN) glargine 100 units/mL SubQ pen     Sig: Inject 5 Units into the skin every evening.     Dispense:  4.5 mL     Refill:  3    JANUVIA 50 mg Tab     Sig: Take 1 tablet (50 mg total) by mouth once daily.     Dispense:  90 tablet     Refill:  4    metFORMIN (GLUCOPHAGE) 1000 MG tablet     Sig: Take 1 tablet (1,000 mg total) by mouth 2 (two) times daily with meals.     Dispense:  180 tablet     Refill:  4          Follow up in about 4 weeks (around 2/16/2024), or if symptoms worsen or fail to improve, for Med refills, with Edna Wallace NP.  Future Appointments       Date Provider Specialty Appt Notes    1/19/2024  Lab     1/25/2024 Mary King NP [facility] follow up    2/29/2024 Jo Ann Haynes, [facility] Diabetes est care    5/20/2024  Lab sarah          If no improvement in symptoms or symptoms worsen, advised to call/follow-up at clinic or go to ER. Patient voiced understanding and all questions/concerns were addressed.   DISCLAIMER: This note was compiled by using a speech recognition dictation system and therefore please be aware that typographical / speech recognition errors can and do occur.  Please contact me if you see any errors specifically.    Antoni Patel M.D.       Office: [phone] [address] [place] [address]  FAX: [phone]

## 2024-01-19 NOTE — ASSESSMENT & PLAN NOTE
Requesting records.  Follow-up with Cardiology as soon as possible.  ER precautions for severe symptoms.

## 2024-01-19 NOTE — TELEPHONE ENCOUNTER
Spoke with patients daughter to assist with scheduling a new patient appointment with diabetes management

## 2024-01-20 LAB
ALBUMIN SERPL BCP-MCNC: 3.2 G/DL (ref 3.5–5.2)
ALP SERPL-CCNC: 75 U/L (ref 55–135)
ALT SERPL W/O P-5'-P-CCNC: 14 U/L (ref 10–44)
ANION GAP SERPL CALC-SCNC: 8 MMOL/L (ref 8–16)
AST SERPL-CCNC: 21 U/L (ref 10–40)
BILIRUB SERPL-MCNC: 0.6 MG/DL (ref 0.1–1)
BUN SERPL-MCNC: 27 MG/DL (ref 8–23)
CALCIUM SERPL-MCNC: 9.8 MG/DL (ref 8.7–10.5)
CHLORIDE SERPL-SCNC: 106 MMOL/L (ref 95–110)
CHOLEST SERPL-MCNC: 147 MG/DL (ref 120–199)
CHOLEST/HDLC SERPL: 4.9 {RATIO} (ref 2–5)
CO2 SERPL-SCNC: 22 MMOL/L (ref 23–29)
CREAT SERPL-MCNC: 1.4 MG/DL (ref 0.5–1.4)
ERYTHROCYTE [DISTWIDTH] IN BLOOD BY AUTOMATED COUNT: 14.1 % (ref 11.5–14.5)
EST. GFR  (NO RACE VARIABLE): 38.7 ML/MIN/1.73 M^2
ESTIMATED AVG GLUCOSE: 283 MG/DL (ref 68–131)
GLUCOSE SERPL-MCNC: 260 MG/DL (ref 70–110)
HBA1C MFR BLD: 11.5 % (ref 4–5.6)
HCT VFR BLD AUTO: 34 % (ref 37–48.5)
HDLC SERPL-MCNC: 30 MG/DL (ref 40–75)
HDLC SERPL: 20.4 % (ref 20–50)
HGB BLD-MCNC: 10.7 G/DL (ref 12–16)
LDLC SERPL CALC-MCNC: 99 MG/DL (ref 63–159)
MCH RBC QN AUTO: 27.7 PG (ref 27–31)
MCHC RBC AUTO-ENTMCNC: 31.5 G/DL (ref 32–36)
MCV RBC AUTO: 88 FL (ref 82–98)
NONHDLC SERPL-MCNC: 117 MG/DL
PHOSPHATE SERPL-MCNC: 3.5 MG/DL (ref 2.7–4.5)
PLATELET # BLD AUTO: 164 K/UL (ref 150–450)
PMV BLD AUTO: 11.5 FL (ref 9.2–12.9)
POTASSIUM SERPL-SCNC: 4.2 MMOL/L (ref 3.5–5.1)
PROT SERPL-MCNC: 8.5 G/DL (ref 6–8.4)
RBC # BLD AUTO: 3.86 M/UL (ref 4–5.4)
SODIUM SERPL-SCNC: 136 MMOL/L (ref 136–145)
TRIGL SERPL-MCNC: 90 MG/DL (ref 30–150)
TSH SERPL DL<=0.005 MIU/L-ACNC: 1.27 UIU/ML (ref 0.4–4)
WBC # BLD AUTO: 7.05 K/UL (ref 3.9–12.7)

## 2024-01-22 ENCOUNTER — TELEPHONE (OUTPATIENT)
Dept: FAMILY MEDICINE | Facility: CLINIC | Age: 79
End: 2024-01-22
Payer: MEDICARE

## 2024-01-22 NOTE — PROGRESS NOTES
Make follow-up lab appointment per recommendation below.  Check to see if patient has seen the results through my chart.  If not then,  #CALL THE PATIENT# to discuss results/see if they have questions and document verification of contact. Make F/U appt if needed. [phone]    #My interpretation that was sent to them through [employer]:  Marianne, I have reviewed your recent blood work.     Your complete blood count is abnormal.  This levels consistent with chronic anemia.  Your metabolic panel which shows your glucose, kidney function, electrolytes, and liver function is abnormal.  Glucose is very high, see A1c below.  Kidney function is within chronic kidney disease stage 3.  Increase hydration with water.  Avoid anti-inflammatory medication.  Phosphorus level is within normal limits.  Thyroid study is normal.   Your cholesterol is stable.  I recommend targeting LDL less than 70.  Please follow-up with me and cardiology to discuss hyperlipidemia management.  Your hemoglobin A1c is elevated.  This levels consistent with uncontrolled diabetes.  I recommend that you take the medications as prescribed.  Follow-up with diabetes management very soon to get your diabetes under control with diet exercise and medications.  This test is gold standard screening test for diabetes.  It is a measures 3 months of your average blood sugar.  =========================  Also please address any outstanding health maintenance that may be due: Pneumococcal Vaccines (Age 65+)(1 - PCV) Never done  Eye Exam Never done  Aspirin/Antiplatelet Therapy Never done  Shingles Vaccine(1 of 2) Never done  RSV Vaccine (Age 60+ and Pregnant patients)(1 - 1-dose 60+ series) Never done  Diabetes Urine Screening due on 07/22/2023  Influenza Vaccine(1) due on 09/01/2023  COVID-19 Vaccine(2 - 2023-24 season) due on 09/01/2023

## 2024-01-22 NOTE — TELEPHONE ENCOUNTER
----- Message from Juliana Spicer sent at 1/22/2024  8:57 AM CST -----  Patients home health physical therapist is calling to report the patients BP still running high. Call back at [phone]

## 2024-01-22 NOTE — TELEPHONE ENCOUNTER
Left message on voice mail to return call, cardiology manages BP medications, Dr Patel recommended pt follow up with them asap, at last visit, advised to contact them and call PCP back if needed

## 2024-01-23 NOTE — TELEPHONE ENCOUNTER
Left message on voice mail to return call and confirm information, for PT, also spoke to patient's daughter and she reports that patient has cardiology appt scheduled next week.

## 2024-01-25 ENCOUNTER — OFFICE VISIT (OUTPATIENT)
Dept: UROLOGY | Facility: CLINIC | Age: 79
End: 2024-01-25
Payer: MEDICARE

## 2024-01-25 VITALS
HEART RATE: 82 BPM | WEIGHT: 135.13 LBS | DIASTOLIC BLOOD PRESSURE: 83 MMHG | HEIGHT: 69 IN | BODY MASS INDEX: 20.01 KG/M2 | SYSTOLIC BLOOD PRESSURE: 131 MMHG

## 2024-01-25 DIAGNOSIS — N32.81 OAB (OVERACTIVE BLADDER): ICD-10-CM

## 2024-01-25 DIAGNOSIS — N39.0 RECURRENT UTI: Primary | ICD-10-CM

## 2024-01-25 PROCEDURE — 99999 PR PBB SHADOW E&M-EST. PATIENT-LVL II: CPT | Mod: PBBFAC,,, | Performed by: NURSE PRACTITIONER

## 2024-01-25 PROCEDURE — 3288F FALL RISK ASSESSMENT DOCD: CPT | Mod: CPTII,S$GLB,, | Performed by: NURSE PRACTITIONER

## 2024-01-25 PROCEDURE — 87086 URINE CULTURE/COLONY COUNT: CPT | Performed by: NURSE PRACTITIONER

## 2024-01-25 PROCEDURE — 1101F PT FALLS ASSESS-DOCD LE1/YR: CPT | Mod: CPTII,S$GLB,, | Performed by: NURSE PRACTITIONER

## 2024-01-25 PROCEDURE — 3079F DIAST BP 80-89 MM HG: CPT | Mod: CPTII,S$GLB,, | Performed by: NURSE PRACTITIONER

## 2024-01-25 PROCEDURE — 3075F SYST BP GE 130 - 139MM HG: CPT | Mod: CPTII,S$GLB,, | Performed by: NURSE PRACTITIONER

## 2024-01-25 PROCEDURE — 99214 OFFICE O/P EST MOD 30 MIN: CPT | Mod: S$GLB,,, | Performed by: NURSE PRACTITIONER

## 2024-01-25 RX ORDER — VIBEGRON 75 MG/1
75 TABLET, FILM COATED ORAL DAILY
Qty: 90 TABLET | Refills: 3 | Status: SHIPPED | OUTPATIENT
Start: 2024-01-25 | End: 2024-01-25

## 2024-01-25 NOTE — PROGRESS NOTES
Chief Complaint:   Recurrent urinary tract infections    HPI:   Patient is 78-year-old female that has been followed by this clinic for recurrent urinary tract infections has been asymptomatic since our last visit.  States that increasing water and decreasing caffeinated has decreased nocturia to once nightly.  Urine in clinic indicates leukocytes, all other parameters are negative.  Denies gross hematuria, pelvic or flank pain.  PVR was 14 mL.    Allergies:  Morphine    Medications:  has a current medication list which includes the following prescription(s): accu-chek candida plus meter, accu-chek candida plus test strp, amlodipine, aspirin, atorvastatin, carvedilol, cholecalciferol (vitamin d3), diclofenac sodium, estradiol, lantus solostar u-100 insulin, irbesartan-hydrochlorothiazide, isosorbide mononitrate, januvia, metformin, and pantoprazole.    Review of Systems:  General: No fever, chills, fatigability, or weight loss.  Skin: No rashes, itching, or changes in color or texture of skin.  Chest: Denies LIANG, cyanosis, wheezing, cough, and sputum production.  Abdomen: Appetite fine. No weight loss. Denies diarrhea, abdominal pain, hematemesis, or blood in stool.  Musculoskeletal: No joint stiffness or swelling. Denies back pain.  : As above.  All other review of systems negative.    PMH:   has a past medical history of Allergy, Cataract, Dementia, Depression, Diabetes mellitus, type 2, Heart murmur, Hypertension, and Stroke.    PSH:   has a past surgical history that includes Hip replacement arthroplasty; Eye surgery; and Hysterectomy.    FamHx: family history includes Cancer in her mother and sister; Hypertension in her mother.    SocHx:  reports that she has never smoked. She has never used smokeless tobacco. She reports that she does not drink alcohol and does not use drugs.      Physical Exam:  Vitals:    01/25/24 1116   BP: 131/83   Pulse: 82     General:  Thin, older  female, appears older  than age A&Ox3, no apparent distress, no deformities  Neck: No masses, normal thyroid  Lungs: normal inspiration, no use of accessory muscles  Heart: normal pulse, no arrhythmias  Abdomen: Soft, NT, ND, no masses, no hernias, no hepatosplenomegaly  Lymphatic: Neck and groin nodes negative      Labs/Studies:   See HPI    Impression/Plan:   Urine will be sent for culture, patient to return to clinic in 12 months for re-evaluation.  Family and patient are aware to contact our clinic with any signs and symptoms of a urinary tract infection.

## 2024-01-27 LAB — BACTERIA UR CULT: NO GROWTH

## 2024-01-29 ENCOUNTER — TELEPHONE (OUTPATIENT)
Dept: UROLOGY | Facility: CLINIC | Age: 79
End: 2024-01-29
Payer: MEDICARE

## 2024-01-29 NOTE — TELEPHONE ENCOUNTER
Called and spoke with daughter and informed of below per Mary King NP. Daughter voiced understanding.    Oliva Heard LPN    ----- Message from Mary King NP sent at 1/27/2024 12:18 PM CST -----  Please call patient inform her that urine culture is negative for urinary tract infection.

## 2024-02-29 ENCOUNTER — OFFICE VISIT (OUTPATIENT)
Dept: DIABETES | Facility: CLINIC | Age: 79
End: 2024-02-29
Payer: MEDICARE

## 2024-02-29 VITALS
DIASTOLIC BLOOD PRESSURE: 70 MMHG | HEART RATE: 80 BPM | WEIGHT: 128.88 LBS | SYSTOLIC BLOOD PRESSURE: 116 MMHG | HEIGHT: 69 IN | BODY MASS INDEX: 19.09 KG/M2

## 2024-02-29 DIAGNOSIS — I50.32 CHRONIC DIASTOLIC CONGESTIVE HEART FAILURE: ICD-10-CM

## 2024-02-29 DIAGNOSIS — E11.69 HYPERLIPIDEMIA ASSOCIATED WITH TYPE 2 DIABETES MELLITUS: Chronic | ICD-10-CM

## 2024-02-29 DIAGNOSIS — N18.31 TYPE 2 DIABETES MELLITUS WITH STAGE 3A CHRONIC KIDNEY DISEASE, WITHOUT LONG-TERM CURRENT USE OF INSULIN: Primary | ICD-10-CM

## 2024-02-29 DIAGNOSIS — E11.59 HYPERTENSION ASSOCIATED WITH DIABETES: Chronic | ICD-10-CM

## 2024-02-29 DIAGNOSIS — I25.118 CORONARY ARTERY DISEASE INVOLVING NATIVE CORONARY ARTERY OF NATIVE HEART WITH OTHER FORM OF ANGINA PECTORIS: Chronic | ICD-10-CM

## 2024-02-29 DIAGNOSIS — N18.32 STAGE 3B CHRONIC KIDNEY DISEASE: ICD-10-CM

## 2024-02-29 DIAGNOSIS — F01.B3 MODERATE VASCULAR DEMENTIA WITH MOOD DISTURBANCE: Chronic | ICD-10-CM

## 2024-02-29 DIAGNOSIS — E78.5 HYPERLIPIDEMIA ASSOCIATED WITH TYPE 2 DIABETES MELLITUS: Chronic | ICD-10-CM

## 2024-02-29 DIAGNOSIS — I15.2 HYPERTENSION ASSOCIATED WITH DIABETES: Chronic | ICD-10-CM

## 2024-02-29 DIAGNOSIS — E11.22 TYPE 2 DIABETES MELLITUS WITH STAGE 3A CHRONIC KIDNEY DISEASE, WITHOUT LONG-TERM CURRENT USE OF INSULIN: Primary | ICD-10-CM

## 2024-02-29 DIAGNOSIS — Z86.73 HISTORY OF CVA (CEREBROVASCULAR ACCIDENT): ICD-10-CM

## 2024-02-29 LAB — GLUCOSE SERPL-MCNC: 226 MG/DL (ref 70–110)

## 2024-02-29 PROCEDURE — 1126F AMNT PAIN NOTED NONE PRSNT: CPT | Mod: CPTII,S$GLB,, | Performed by: NURSE PRACTITIONER

## 2024-02-29 PROCEDURE — 3078F DIAST BP <80 MM HG: CPT | Mod: CPTII,S$GLB,, | Performed by: NURSE PRACTITIONER

## 2024-02-29 PROCEDURE — 1101F PT FALLS ASSESS-DOCD LE1/YR: CPT | Mod: CPTII,S$GLB,, | Performed by: NURSE PRACTITIONER

## 2024-02-29 PROCEDURE — 82962 GLUCOSE BLOOD TEST: CPT | Mod: S$GLB,,, | Performed by: NURSE PRACTITIONER

## 2024-02-29 PROCEDURE — 99204 OFFICE O/P NEW MOD 45 MIN: CPT | Mod: S$GLB,,, | Performed by: NURSE PRACTITIONER

## 2024-02-29 PROCEDURE — 3288F FALL RISK ASSESSMENT DOCD: CPT | Mod: CPTII,S$GLB,, | Performed by: NURSE PRACTITIONER

## 2024-02-29 PROCEDURE — 3074F SYST BP LT 130 MM HG: CPT | Mod: CPTII,S$GLB,, | Performed by: NURSE PRACTITIONER

## 2024-02-29 PROCEDURE — 99999 PR PBB SHADOW E&M-EST. PATIENT-LVL IV: CPT | Mod: PBBFAC,,, | Performed by: NURSE PRACTITIONER

## 2024-02-29 RX ORDER — METFORMIN HYDROCHLORIDE 500 MG/1
500 TABLET ORAL 2 TIMES DAILY WITH MEALS
Qty: 60 TABLET | Refills: 11 | Status: SHIPPED | OUTPATIENT
Start: 2024-02-29 | End: 2025-02-28

## 2024-02-29 RX ORDER — BLOOD-GLUCOSE SENSOR
1 EACH MISCELLANEOUS
Qty: 3 EACH | Refills: 11 | Status: SHIPPED | OUTPATIENT
Start: 2024-02-29 | End: 2024-04-11 | Stop reason: SDUPTHER

## 2024-02-29 RX ORDER — BLOOD-GLUCOSE,RECEIVER,CONT
1 EACH MISCELLANEOUS DAILY
Qty: 1 EACH | Refills: 0 | Status: SHIPPED | OUTPATIENT
Start: 2024-02-29 | End: 2025-02-28

## 2024-02-29 NOTE — PATIENT INSTRUCTIONS
PATIENT INSTRUCTIONS    Labs ordered and will be scheduled by [facility] staff.    Refer to diabetes education.      Continue Lantus 5 units subcutaneously daily.   OK to hold dose if glucose less than 120.     Decrease Metformin to 500 mg by mouth twice daily with meals. - GFR 43    Continue Januvia 50 mg by mouth daily.     Dexcom G7 CGM ordered.   Blood Sugar Goals:       Fastin-130.

## 2024-02-29 NOTE — PROGRESS NOTES
Marianne Cali is a 78 y.o. female who  has a past medical history of Allergy, Cataract, Dementia, Depression, Diabetes mellitus, type 2, Heart murmur, Hypertension, and Stroke., who present for an initial evaluation of Type 2 diabetes mellitus.     CHIEF COMPLAINT: Diabetes Consultation    PCP: Antoni Patel MD     The patient was initially diagnosed with diabetes 6 years ago.     Previous failed treatments include:  None.    Social Documentation:  Patient lives in [place] with .   Occupation: retired.   Exercise: limited due to health problems.     Current monitoring regimen: capillary blood glucose monitoring with finger sticks.  Fasting and at HS 120s.     Current Diabetes related symptoms/problems include hyperglycemia and have been stable.     Diabetes related complications:   cerebrovascular disease.   denies Pancreatitis  denies Gastroparesis  denies DKA  denies Hx/family Hx of MEN2/MTC  reports Frequent UTIs/yeast infections    Cardiovascular Risk Factors: advanced age (>55 for men, >65 for women), dyslipidemia, hypertension, and sedentary lifestyle.    Patient currently taking insulin or sulfonylurea?  Yes. Emergency Glucagon Prescription current? no    Any episodes of hypoglycemia? No.   Hypoglycemia Unawareness? No  Severe hypoglycemia requiring 3rd party? No    Last seen by Diabetes Education: none    Diabetes Medications               insulin (LANTUS SOLOSTAR U-100 INSULIN) glargine 100 units/mL SubQ pen Inject 5 Units into the skin every evening. - TAKING IN THE EVENING IF GLUCOSE OVER 120.    metFORMIN (GLUCOPHAGE) 1000 MG tablet Take 1 tablet (1,000 mg total) by mouth 2 (two) times daily with meals.    JANUVIA 50 mg Tab Take 1 tablet (50 mg total) by mouth once daily.        DIABETES DISEASE MANAGEMENT STATUS  Statin: Taking  ACE/ARB: Taking  Screening or Prevention Patient's value Goal Complete/Controlled?   HgA1C Testing and Control   Lab Results   Component Value Date    HGBA1C 11.5  (H) 01/19/2024      Annually/Less than 8% No   Lipid profile : 01/19/2024 Annually Yes   LDL control Lab Results   Component Value Date    LDLCALC 99.0 01/19/2024    Annually/Less than 100 mg/dl  Yes   Nephropathy screening Lab Results   Component Value Date    LABMICR 55.0 07/22/2022     Lab Results   Component Value Date    PROTEINUA Trace (A) 01/19/2024     Lab Results   Component Value Date    UTPCR 0.12 04/26/2023      Annually Yes   Blood pressure BP Readings from Last 1 Encounters:   02/29/24 116/70    Less than 140/90 Yes   Dilated retinal exam Most Recent Eye Exam Date: Not Found Annually No   Foot exam   Most Recent Foot Exam Date: Not Found Annually No   Patient's medications, allergies, past medical, surgical, social and family histories were reviewed and updated as appropriate.     Review of Systems   Constitutional:  Negative for weight loss.   Eyes:  Negative for blurred vision and double vision.   Cardiovascular:  Negative for chest pain.   Gastrointestinal:  Negative for nausea and vomiting.   Genitourinary:  Negative for frequency.   Musculoskeletal:  Negative for falls.   Neurological:  Negative for dizziness and weakness.   Endo/Heme/Allergies:  Negative for polydipsia.   Psychiatric/Behavioral:  Negative for depression.    All other systems reviewed and are negative.       Physical Exam  Constitutional:       Appearance: Normal appearance.   HENT:      Head: Normocephalic and atraumatic.   Eyes:      Extraocular Movements: Extraocular movements intact.      Pupils: Pupils are equal, round, and reactive to light.   Cardiovascular:      Rate and Rhythm: Normal rate and regular rhythm.      Heart sounds: Normal heart sounds.   Pulmonary:      Effort: Pulmonary effort is normal. No respiratory distress.      Breath sounds: Normal breath sounds.   Musculoskeletal:         General: No swelling.      Cervical back: Normal range of motion.   Skin:     General: Skin is warm and dry.   Neurological:       "Mental Status: She is alert and oriented to person, place, and time.   Psychiatric:         Mood and Affect: Mood normal.         Behavior: Behavior normal.          Blood pressure 116/70, pulse 80, height 5' 9" (1.753 m), weight 58.5 kg (128 lb 14.4 oz).  Wt Readings from Last 3 Encounters:   02/29/24 58.5 kg (128 lb 14.4 oz)   01/25/24 61.3 kg (135 lb 2.3 oz)   01/19/24 61.3 kg (135 lb 1.6 oz)       LAB REVIEW  Lab Results   Component Value Date     02/02/2024    K 4.5 02/02/2024     01/19/2024    CO2 22 02/02/2024    BUN 28 (H) 02/02/2024    CREATININE 1.42 (H) 02/02/2024    CALCIUM 8.9 02/02/2024    ANIONGAP 6 (L) 02/02/2024    EGFRNORACEVR 38.7 (A) 01/19/2024     No results found for: "CPEPTIDE", "GLUTAMICACID", "INSLNABS"  Hemoglobin A1C   Date Value Ref Range Status   01/19/2024 11.5 (H) 4.0 - 5.6 % Final     Comment:     ADA Screening Guidelines:  5.7-6.4%  Consistent with prediabetes  >or=6.5%  Consistent with diabetes    High levels of fetal hemoglobin interfere with the HbA1C  assay. Heterozygous hemoglobin variants (HbS, HgC, etc)do  not significantly interfere with this assay.   However, presence of multiple variants may affect accuracy.     01/13/2024 11.6 (H) 4.6 - 6.2 % Final   08/21/2023 8.1 (H) 4.0 - 5.6 % Final     Comment:     ADA Screening Guidelines:  5.7-6.4%  Consistent with prediabetes  >or=6.5%  Consistent with diabetes    High levels of fetal hemoglobin interfere with the HbA1C  assay. Heterozygous hemoglobin variants (HbS, HgC, etc)do  not significantly interfere with this assay.   However, presence of multiple variants may affect accuracy.     04/20/2023 6.3 (H) 4.0 - 5.6 % Final     Comment:     ADA Screening Guidelines:  5.7-6.4%  Consistent with prediabetes  >or=6.5%  Consistent with diabetes    High levels of fetal hemoglobin interfere with the HbA1C  assay. Heterozygous hemoglobin variants (HbS, HgC, etc)do  not significantly interfere with this assay.   However, " presence of multiple variants may affect accuracy.         Lab Results   Component Value Date    POCGLU 226 (A) 02/29/2024       ASSESSMENT    ICD-10-CM ICD-9-CM   1. Type 2 diabetes mellitus with stage 3a chronic kidney disease, without long-term current use of insulin  E11.22 250.40    N18.31 585.3   2. Coronary artery disease involving native coronary artery of native heart with other form of angina pectoris  I25.118 414.01     413.9   3. Hypertension associated with diabetes  E11.59 250.80    I15.2 401.9   4. Hyperlipidemia associated with type 2 diabetes mellitus  E11.69 250.80    E78.5 272.4   5. Chronic diastolic congestive heart failure  I50.32 428.32     428.0   6. Stage 3b chronic kidney disease  N18.32 585.3   7. Moderate vascular dementia with mood disturbance  F01.B3 290.40   8. History of CVA (cerebrovascular accident)  Z86.73 V12.54        PLAN  Diagnoses and all orders for this visit:    Type 2 diabetes mellitus with stage 3a chronic kidney disease, without long-term current use of insulin  -     Ambulatory referral/consult to Diabetic Advanced Practice Providers (Medical Management)  -     POCT Glucose, Hand-Held Device  -     Insulin Antibody; Future  -     Glutamic Acid Decarboxylase; Future  -     C-Peptide; Future  -     Basic Metabolic Panel; Future  -     blood-glucose sensor (DEXCOM G7 SENSOR) [place]; 1 Device by Misc.(Non-Drug; Combo Route) route every 10 days.  -     blood-glucose meter,continuous (DEXCOM G7 ) Misc; 1 Device by Misc.(Non-Drug; Combo Route) route once daily.  -     metFORMIN (GLUCOPHAGE) 500 MG tablet; Take 1 tablet (500 mg total) by mouth 2 (two) times daily with meals.    Coronary artery disease involving native coronary artery of native heart with other form of angina pectoris    Hypertension associated with diabetes    Hyperlipidemia associated with type 2 diabetes mellitus    Chronic diastolic congestive heart failure    Stage 3b chronic kidney disease    Moderate  vascular dementia with mood disturbance    History of CVA (cerebrovascular accident)        Reviewed pathophysiology of diabetes, complications related to the disease, importance of annual dilated eye exam and daily foot examination. Explained MOA, SE, dosage of medications. Written instructions given and reviewed with patient and patient verbalizes understanding. Discussed importance of A1c less than 7% to reduce risk of micro and macro complications, including nephropathy, neuropathy, retinopathy, heart attack and stroke. Reviewed the importance of controlled Blood Pressure and Cholesterol Lab Values in preventing disease.     PATIENT INSTRUCTIONS    Labs ordered and will be scheduled by [facility] staff.    Refer to diabetes education.      Continue Lantus 5 units subcutaneously daily.   OK to hold dose if glucose less than 120.     Decrease Metformin to 500 mg by mouth twice daily with meals. - GFR 43    Continue Januvia 50 mg by mouth daily.     Dexcom G7 CGM ordered.   Blood Sugar Goals:       Fastin-130.       1-2 hours after a meal: Less than 180.     Follow up in about 6 weeks (around 2024) for Dexcom, Schedule fasting labs, Schedule DM Education.

## 2024-03-04 ENCOUNTER — OFFICE VISIT (OUTPATIENT)
Dept: FAMILY MEDICINE | Facility: CLINIC | Age: 79
End: 2024-03-04
Payer: MEDICARE

## 2024-03-04 VITALS
DIASTOLIC BLOOD PRESSURE: 68 MMHG | WEIGHT: 127.81 LBS | BODY MASS INDEX: 18.93 KG/M2 | OXYGEN SATURATION: 97 % | SYSTOLIC BLOOD PRESSURE: 118 MMHG | HEIGHT: 69 IN | HEART RATE: 72 BPM

## 2024-03-04 DIAGNOSIS — R53.1 GENERALIZED WEAKNESS: ICD-10-CM

## 2024-03-04 DIAGNOSIS — I15.2 HYPERTENSION ASSOCIATED WITH DIABETES: Chronic | ICD-10-CM

## 2024-03-04 DIAGNOSIS — N18.32 STAGE 3B CHRONIC KIDNEY DISEASE: ICD-10-CM

## 2024-03-04 DIAGNOSIS — E11.59 HYPERTENSION ASSOCIATED WITH DIABETES: Chronic | ICD-10-CM

## 2024-03-04 DIAGNOSIS — Z86.711 HISTORY OF PULMONARY EMBOLISM: Primary | ICD-10-CM

## 2024-03-04 PROCEDURE — 3078F DIAST BP <80 MM HG: CPT | Mod: CPTII,S$GLB,, | Performed by: NURSE PRACTITIONER

## 2024-03-04 PROCEDURE — 1159F MED LIST DOCD IN RCRD: CPT | Mod: CPTII,S$GLB,, | Performed by: NURSE PRACTITIONER

## 2024-03-04 PROCEDURE — 99999 PR PBB SHADOW E&M-EST. PATIENT-LVL V: CPT | Mod: PBBFAC,,, | Performed by: NURSE PRACTITIONER

## 2024-03-04 PROCEDURE — 99214 OFFICE O/P EST MOD 30 MIN: CPT | Mod: S$GLB,,, | Performed by: NURSE PRACTITIONER

## 2024-03-04 PROCEDURE — 3074F SYST BP LT 130 MM HG: CPT | Mod: CPTII,S$GLB,, | Performed by: NURSE PRACTITIONER

## 2024-03-04 PROCEDURE — 3288F FALL RISK ASSESSMENT DOCD: CPT | Mod: CPTII,S$GLB,, | Performed by: NURSE PRACTITIONER

## 2024-03-04 PROCEDURE — 1160F RVW MEDS BY RX/DR IN RCRD: CPT | Mod: CPTII,S$GLB,, | Performed by: NURSE PRACTITIONER

## 2024-03-04 PROCEDURE — 1126F AMNT PAIN NOTED NONE PRSNT: CPT | Mod: CPTII,S$GLB,, | Performed by: NURSE PRACTITIONER

## 2024-03-04 PROCEDURE — 1101F PT FALLS ASSESS-DOCD LE1/YR: CPT | Mod: CPTII,S$GLB,, | Performed by: NURSE PRACTITIONER

## 2024-03-04 RX ORDER — APIXABAN 5 MG/1
TABLET, FILM COATED ORAL
COMMUNITY
Start: 2024-02-22

## 2024-03-04 RX ORDER — VIBEGRON 75 MG/1
1 TABLET, FILM COATED ORAL DAILY
COMMUNITY

## 2024-03-04 NOTE — PROGRESS NOTES
Assessment/Plan:  Problem List Items Addressed This Visit          Cardiac/Vascular    Hypertension associated with diabetes (Chronic)    Overview     CHRONIC. STABLE. BP Reviewed. BP at goal today. Compliant with BP medications. No SE reported.    (-) CP, SOB, palpitations, dizziness, lightheadedness, HA, arm numbness, tingling or weakness, syncope.  Creatinine   Date Value Ref Range Status   02/02/2024 1.42 (H) 0.60 - 1.10 mg/dL Final   01/19/2024 1.4 0.5 - 1.4 mg/dL Final     Hypertension Medications               amLODIPine (NORVASC) 5 MG tablet Take 5 mg by mouth.    carvediloL (COREG) 3.125 MG tablet TAKE 1 TABLET BY MOUTH EVERY MORNING and ONE IN THE EVENING WITH A MEAL    irbesartan-hydrochlorothiazide (AVALIDE) 300-12.5 mg per tablet irbesartan 300 mg-hydrochlorothiazide 12.5 mg tablet   TAKE 1 TABLET BY MOUTH EVERY DAY    isosorbide mononitrate (IMDUR) 30 MG 24 hr tablet TAKE 1 TABLET BY MOUTH ONCE DAILY              Current Assessment & Plan     Continue current blood pressure medication regimen. Recommend ambulatory monitoring of BP. Notify PCP of fluctuations in readings or if BP remains greater than 140/80. Counseled on importance of hypertension disease course, I recommend ongoing Education for DASH-diet and exercise. Counseled on medication regimen importance of treating high blood pressure. Please be advised of risk of untreated blood pressure as discussed. Please advised of ER precautions were given for symptoms of hypertensive urgency and emergency.             Renal/    Stage 3b chronic kidney disease    Overview     Chronic. Patient with comorbid hypertension and diabetes.  Follows with nephrology.     BMP  Lab Results   Component Value Date     02/02/2024    K 4.5 02/02/2024     01/19/2024    CO2 22 02/02/2024    BUN 28 (H) 02/02/2024    CREATININE 1.42 (H) 02/02/2024    CALCIUM 8.9 02/02/2024    ANIONGAP 6 (L) 02/02/2024    ESTGFRAFRICA 51 (L) 10/06/2022    EGFRNONAA 33.8 (A)  07/22/2022    EGFRNONAA 33.8 (A) 07/22/2022          Current Assessment & Plan     Avoid anti-inflammatory medications. Increase hydration with water. Follow up with nephrology.             Hematology    History of pulmonary embolism - Primary    Overview     Recent hospital encounter 2/2/24. Patient is following with pulmonology. Started on Eliquis 5 mg BID. Compliant with medications. There is no chest pain, shortness of breath.     CTA chest  REASON FOR EXAM: Sepsis     TECHNICAL FACTORS: Multiple contiguous axial CT images were obtained of the chest, abdomen and pelvis after administration of intravenous contrast. 2D reformatted images were performed. Automated exposure control was utilized for radiation dose reduction.     COMPARISON: January 30, 2024    CHEST FINDINGS: Emphysema is present in the upper lungs. Bilateral interstitial scarring as visualized. Calcified granuloma is present in the lingula. A filling defect is present within the left pulmonary artery suggestive of pulmonary embolus. There is no evidence of right heart strain. Heart is normal in size. Coronary artery calcifications are present. There is no evidence of pleural effusion or pneumothorax. There is no evidence of lymphadenopathy. Old left rib fractures are present. Degenerative changes are present in the spine. Pacemaker and loop recorder are in place.    ABDOMEN FINDINGS: The liver, spleen, pancreas, adrenal glands, and kidneys are normal in appearance. The bowel is unopacified limiting detail. Atheromatous changes are present in the abdominal aorta and iliac arteries. There is no evidence of free fluid or lymphadenopathy. Degenerative changes are present in the lower lumbar spine.    PELVIS FINDINGS: Artifact from left hip arthroplasty and right hip ORIF obscures detail of the lower pelvis. The visualized urinary bladder is normal in appearance. Uterus is not definitely visualized. The appendix is normal in caliber without evidence of  adjacent inflammatory stranding. Diverticula are visualized within the sigmoid colon without evidence of diverticulitis. There is no evidence of free fluid or lymphadenopathy.     IMPRESSION:  1. Pulmonary embolus within the left pulmonary artery. There is no evidence of right heart strain.  2. Coronary artery disease.  3. Emphysema with bilateral interstitial scarring.  4. Postoperative changes as described.  Electronically signed by Paul Altamirano MD on 2/2/2024 9:09 AM          Current Assessment & Plan     Continue Eliquis. Continue closely following with pulmonology. ER precautions.             Other    Generalized weakness    Overview     Chronic. Generalized weakness/debility. Lives at home with her . Has cane, walker, wheelchair. She is interested in home health. Her daughter would like her to do outpatient physical therapy at [facility].          Current Assessment & Plan     Home health and physical therapy ordered. Fall precautions.          Relevant Orders    Ambulatory referral/consult to Physical/Occupational Therapy    Ambulatory referral/consult to Home Health     Follow up if symptoms worsen or fail to improve.  ER precautions for severe or worsening symptoms.     Edna Wallace NP  _____________________________________________________________________________________________________________________________________________________    CC: follow up     HPI: Patient is a 78-year-old female who presents in clinic today accompanied by her daughter as an established patient here for follow. She was recently hospitalized at [facility] on 2/2/24 for acute pulmonary embolism. Patient was started on Eliquis BID. She has since followed up with pulmonology. She reports feeling well. No acute complaints. Denies any other questions, problems, or concerns. Her daughter would like to set up outpatient physical therapy and home health.     Transitional Care Note  Family and/or Caretaker present at visit?   Daughter  Diagnostic tests reviewed/disposition: No diagnosic tests pending after this hospitalization.  Disease/illness education: she understands what she had.  Home health/community services discussion/referrals: Patient does not have home health established from hospital visit.  They do need home health; we will set up home health for the patient.   Establishment or re-establishment of referral orders for community resources: No other necessary community resources.   Discussion with other health care providers: No discussion with other health care providers necessary.     Past Medical History:  Past Medical History:   Diagnosis Date    Allergy     Cataract     Dementia     Depression     Diabetes mellitus, type 2     Heart murmur     Hypertension     Stroke      Past Surgical History:   Procedure Laterality Date    EYE SURGERY      HIP REPLACEMENT ARTHROPLASTY      HYSTERECTOMY       Review of patient's allergies indicates:   Allergen Reactions    Morphine Anxiety and Other (See Comments)     Dizziness     Social History     Tobacco Use    Smoking status: Never    Smokeless tobacco: Never   Substance Use Topics    Alcohol use: Never    Drug use: Never     Family History   Problem Relation Age of Onset    Cancer Mother     Hypertension Mother     Cancer Sister      Current Outpatient Medications on File Prior to Visit   Medication Sig Dispense Refill    ACCU-CHEK AMELIA PLUS METER Misc Use 1 device  three times a day 1 each 0    ACCU-CHEK AMELIA PLUS TEST STRP Strp Inject 1 strip into the skin 3 (three) times daily. 100 each 3    amLODIPine (NORVASC) 5 MG tablet Take 5 mg by mouth.      atorvastatin (LIPITOR) 80 MG tablet Take 1 tablet (80 mg total) by mouth once daily. 90 tablet 4    blood-glucose meter,continuous (DEXCOM G7 ) Misc 1 Device by Misc.(Non-Drug; Combo Route) route once daily. 1 each 0    blood-glucose sensor (DEXCOM G7 SENSOR) Philomena 1 Device by Misc.(Non-Drug; Combo Route) route every 10 days.  3 each 11    carvediloL (COREG) 3.125 MG tablet TAKE 1 TABLET BY MOUTH EVERY MORNING and ONE IN THE EVENING WITH A MEAL      cholecalciferol, vitamin D3, (VITAMIN D3) 25 mcg (1,000 unit) capsule Take 1 capsule (1,000 Units total) by mouth once daily. 90 capsule 3    diclofenac sodium (VOLTAREN) 1 % Gel Apply topically.      ELIQUIS 5 mg Tab TAKE TWO TABLETS BY MOUTH TWICE DAILY FOR 13 DAYS, then TAKE 1 TABLET BY MOUTH TWICE DAILY FOR 17 days      estradioL (ESTRACE) 0.01 % (0.1 mg/gram) vaginal cream Place 1 g vaginally 3 (three) times a week.      GEMTESA 75 mg Tab Take 1 tablet by mouth once daily.      insulin (LANTUS SOLOSTAR U-100 INSULIN) glargine 100 units/mL SubQ pen Inject 5 Units into the skin every evening. 4.5 mL 3    irbesartan-hydrochlorothiazide (AVALIDE) 300-12.5 mg per tablet irbesartan 300 mg-hydrochlorothiazide 12.5 mg tablet   TAKE 1 TABLET BY MOUTH EVERY DAY      isosorbide mononitrate (IMDUR) 30 MG 24 hr tablet TAKE 1 TABLET BY MOUTH ONCE DAILY 90 tablet 1    JANUVIA 50 mg Tab Take 1 tablet (50 mg total) by mouth once daily. 90 tablet 4    metFORMIN (GLUCOPHAGE) 500 MG tablet Take 1 tablet (500 mg total) by mouth 2 (two) times daily with meals. 60 tablet 11    pantoprazole (PROTONIX) 40 MG tablet Take 40 mg by mouth every morning.      aspirin (ECOTRIN) 81 MG EC tablet Take 1 tablet (81 mg total) by mouth once. for 1 dose 30 tablet 3     Current Facility-Administered Medications on File Prior to Visit   Medication Dose Route Frequency Provider Last Rate Last Admin    [DISCONTINUED] GENERIC EXTERNAL MEDICATION     Provider, Generic External Data         Review of Systems   Constitutional:  Positive for fatigue. Negative for chills, fever and unexpected weight change.   HENT:  Negative for ear pain and sore throat.    Eyes:  Negative for redness and visual disturbance.   Respiratory:  Negative for cough and shortness of breath.    Cardiovascular:  Negative for chest pain and palpitations.  "  Gastrointestinal:  Negative for abdominal distention, abdominal pain, blood in stool, constipation, diarrhea, nausea and vomiting.   Genitourinary:  Negative for difficulty urinating and hematuria.   Musculoskeletal:  Positive for arthralgias and gait problem. Negative for myalgias.   Skin:  Negative for rash and wound.   Neurological:  Positive for weakness. Negative for headaches.   Psychiatric/Behavioral:  Negative for sleep disturbance. The patient is not nervous/anxious.      Vitals:    03/04/24 1015   BP: 118/68   Pulse: 72   SpO2: 97%   Weight: 58 kg (127 lb 12.8 oz)   Height: 5' 9" (1.753 m)     Wt Readings from Last 3 Encounters:   03/04/24 58 kg (127 lb 12.8 oz)   02/29/24 58.5 kg (128 lb 14.4 oz)   01/25/24 61.3 kg (135 lb 2.3 oz)     Physical Exam  Vitals and nursing note reviewed.   Constitutional:       General: She is not in acute distress.     Appearance: She is well-developed. She is not ill-appearing, toxic-appearing or diaphoretic.      Comments: Here with daughter   HENT:      Head: Normocephalic and atraumatic.      Right Ear: Hearing and external ear normal.      Left Ear: Hearing and external ear normal.      Nose: Nose normal. No rhinorrhea.   Eyes:      General: Lids are normal.      Extraocular Movements: Extraocular movements intact.      Conjunctiva/sclera: Conjunctivae normal.      Pupils: Pupils are equal, round, and reactive to light.   Cardiovascular:      Rate and Rhythm: Normal rate.      Pulses: Normal pulses.   Pulmonary:      Effort: Pulmonary effort is normal. No respiratory distress.      Breath sounds: Normal breath sounds.   Abdominal:      General: Bowel sounds are normal.      Palpations: Abdomen is soft.   Musculoskeletal:         General: Normal range of motion.      Cervical back: Normal range of motion and neck supple.   Skin:     General: Skin is warm and dry.      Capillary Refill: Capillary refill takes less than 2 seconds.      Coloration: Skin is not pale. "   Neurological:      General: No focal deficit present.      Mental Status: She is alert and oriented to person, place, and time. Mental status is at baseline. She is not disoriented.      Cranial Nerves: No cranial nerve deficit.      Motor: Weakness present.      Gait: Gait abnormal (In wheelchair today).   Psychiatric:         Attention and Perception: She is attentive.         Mood and Affect: Mood normal. Mood is not anxious or depressed.         Speech: Speech is not rapid and pressured or slurred.         Behavior: Behavior normal. Behavior is not agitated, aggressive or hyperactive. Behavior is cooperative.         Thought Content: Thought content normal. Thought content is not paranoid or delusional. Thought content does not include homicidal or suicidal ideation. Thought content does not include homicidal or suicidal plan.         Cognition and Memory: Memory is impaired.         Judgment: Judgment normal.       Health Maintenance   Topic Date Due    Eye Exam  Never done    Shingles Vaccine (1 of 2) Never done    Hemoglobin A1c  04/19/2024    DEXA Scan  08/13/2024    Lipid Panel  01/19/2025    TETANUS VACCINE  08/08/2027    Hepatitis C Screening  Completed

## 2024-03-04 NOTE — PATIENT INSTRUCTIONS
Estuardo Lopes,     If you are due for any health screening(s) below please notify me so we can arrange them to be ordered and scheduled. Most healthy patients at your age complete them, but you are free to accept or refuse.     If you can't do it, I'll definitely understand. If you can, I'd certainly appreciate it!    Tests to Keep You Healthy    Eye Exam: DUE  Last Blood Pressure <= 139/89 (1/19/2024): Yes      Lets manage your A1c levels     Your last hemoglobin A1c was higher than the goal of less than 8. Hemoglobin A1c or HbA1c is a blood test that measures your average blood sugar levels over the past 3 months. It is the main test to help you and your health care team manage your diabetes.     Higher A1c levels are linked to diabetes complications, such as loss of vision, kidney disease, and nerve damage. Keeping your A1c at least less than 8 is important to stay healthy and we are here to help. Talk with your provider on how you can further improve your A1c.     We recommend that you set up blood work to get a repeat hemoglobin A1c in 3 months to monitor your diabetes. Let your health care team know if you have questions.    Your diabetic retinal eye exam is due     Diabetes is the #1 cause of blindness in the [place] - early detection before signs or symptoms develop can prevent debilitating blindness.     Our records indicate that you may be overdue for your annual diabetic eye exam. Eye screening can help identify patients at risk for developing vision loss which is common in diabetes. This simple screening is an important step to keeping you healthy and preventing complications from diabetes.     This recommended diabetic eye exam should take place once per year and can prevent and treat diabetes complications in the eye before developing symptoms. This can be done with a special camera is used to take photographs of the back of your eye without having to dilate them, or you can see an eye doctor for a full  dilated exam.     If you recently had your yearly diabetic eye exam performed outside of [facility], please let your Health care team know so that they can update your health record.                  Estuardo Lopes,     If you are due for any health screening(s) below please notify me so we can arrange them to be ordered and scheduled. Most healthy patients at your age complete them, but you are free to accept or refuse.     If you can't do it, I'll definitely understand. If you can, I'd certainly appreciate it!    Tests to Keep You Healthy    Eye Exam: DUE  Last Blood Pressure <= 139/89 (1/19/2024): Yes      Lets manage your A1c levels     Your last hemoglobin A1c was higher than the goal of less than 8. Hemoglobin A1c or HbA1c is a blood test that measures your average blood sugar levels over the past 3 months. It is the main test to help you and your health care team manage your diabetes.     Higher A1c levels are linked to diabetes complications, such as loss of vision, kidney disease, and nerve damage. Keeping your A1c at least less than 8 is important to stay healthy and we are here to help. Talk with your provider on how you can further improve your A1c.     We recommend that you set up blood work to get a repeat hemoglobin A1c in 3 months to monitor your diabetes. Let your health care team know if you have questions.    Your diabetic retinal eye exam is due     Diabetes is the #1 cause of blindness in the [place] - early detection before signs or symptoms develop can prevent debilitating blindness.     Our records indicate that you may be overdue for your annual diabetic eye exam. Eye screening can help identify patients at risk for developing vision loss which is common in diabetes. This simple screening is an important step to keeping you healthy and preventing complications from diabetes.     This recommended diabetic eye exam should take place once per year and can prevent and treat diabetes  complications in the eye before developing symptoms. This can be done with a special camera is used to take photographs of the back of your eye without having to dilate them, or you can see an eye doctor for a full dilated exam.     If you recently had your yearly diabetic eye exam performed outside of [facility], please let your Health care team know so that they can update your health record.

## 2024-03-14 ENCOUNTER — CLINICAL SUPPORT (OUTPATIENT)
Dept: DIABETES | Facility: CLINIC | Age: 79
End: 2024-03-14
Payer: MEDICARE

## 2024-03-14 ENCOUNTER — TELEPHONE (OUTPATIENT)
Dept: DIABETES | Facility: CLINIC | Age: 79
End: 2024-03-14
Payer: MEDICARE

## 2024-03-14 VITALS — WEIGHT: 130.5 LBS | HEIGHT: 69 IN | BODY MASS INDEX: 19.33 KG/M2

## 2024-03-14 DIAGNOSIS — N18.31 TYPE 2 DIABETES MELLITUS WITH STAGE 3A CHRONIC KIDNEY DISEASE, WITHOUT LONG-TERM CURRENT USE OF INSULIN: Primary | ICD-10-CM

## 2024-03-14 DIAGNOSIS — E11.22 TYPE 2 DIABETES MELLITUS WITH STAGE 3A CHRONIC KIDNEY DISEASE, WITHOUT LONG-TERM CURRENT USE OF INSULIN: Primary | ICD-10-CM

## 2024-03-14 PROCEDURE — 99999 PR PBB SHADOW E&M-EST. PATIENT-LVL I: CPT | Mod: PBBFAC,,, | Performed by: DIETITIAN, REGISTERED

## 2024-03-14 PROCEDURE — G0108 DIAB MANAGE TRN  PER INDIV: HCPCS | Mod: S$GLB,,, | Performed by: DIETITIAN, REGISTERED

## 2024-03-15 NOTE — PROGRESS NOTES
"Diabetes Care Specialist Progress Note  Author: Kandaec Phillips RD  Date: 3/15/2024    Program Intake  Reason for Diabetes Program Visit:: Initial Diabetes Assessment  Current diabetes risk level:: high (longitudinal plan of care)  In the last 12 months, have you:: used emergency room services  Was the ER or hospital admission related to diabetes?: No  Permission to speak with others about care:: yes ()  Continuous Glucose Monitoring  Patient has CGM: No (Here to learn how to use CGM)    Lab Results   Component Value Date    HGBA1C 11.5 (H) 01/19/2024       Clinical    Weight: 59.2 kg (130 lb 8 oz)   Height: 5' 9" (175.3 cm)   Body mass index is 19.27 kg/m².    Problem Review  Reviewed Problem List with Patient: yes  Active comorbidities affecting diabetes self-care.: yes  Comorbidities: Malnutrition, Chronic Kidney Disease, Other (comment), Hypertension, Cardiovascular Disease (vascular dementia)    Clinical Assessment  Current Diabetes Treatment: Oral Medication, Insulin (Metformin 500 mg BID, Januvia 50 mg, Lantus 5 units)  Have you ever experienced hypoglycemia (low blood sugar)?: no  Have you ever experienced hyperglycemia (high blood sugar)?: yes  In the last month, how often have you experienced high blood sugar?: once a week  Are you able to tell when your blood sugar is high?: No (comment)  Have you ever been hospitalized because your blood sugar was high?: no    Medication Information  How do you obtain your medications?: Family picks up  How many days a week do you miss your medications?: 3 or more (Per  "if blood sugar is low, we don't give her the insulin."  Son gives insulin.   does not know dose of insulin.)  Do you sometimes have difficulty refilling your medications?: No  Medication adherence impacting ability to self-manage diabetes?: Yes    Labs  Do you have regular lab work to monitor your medications?: Yes  Type of Regular Lab Work: A1c  Where do you get your labs drawn?: " "[facility]  Lab Compliance Barriers: No    Nutritional Status  Diet: Regular  Meal Plan 24 Hour Recall: Breakfast, Lunch, Dinner, Snack  Meal Plan 24 Hour Recall - Breakfast: grits and eggs, sugar free flavored water, coffee  Meal Plan 24 Hour Recall - Lunch: fast food or restuarant food: hamburger and fries, [employer], fish sandwich all with sweet tea  Meal Plan 24 Hour Recall - Dinner: "whatever my niece cooks for us."  Meal Plan 24 Hour Recall - Snack: "She likes a lot of sweets like cake and mini pies."  Change in appetite?: Yes  Dentation:: Needs Dentures  Recent Changes in Weight: No Recent Weight Change  Current nutritional status an area of need that is impacting patient's ability to self-manage diabetes?: Yes (Patient eating three meals and snacking.  Maintaining weight, but she is underweight.)    Additional Social History    Support  Does anyone support you with your diabetes care?: (P) yes  Who supports you?: (P) self, spouse  Who takes you to your medical appointments?: (P) spouse  Does the current support meet the patient's needs?: (P) Yes  Is Support an area impacting ability to self-manage diabetes?: (P) No    Access to Mass Media & Technology  Media or technology needs impacting ability to self-manage diabetes?: (P) No    Cognitive/Behavioral Health  Alert and Oriented: (P) Yes  Difficulty Thinking: (P) Yes  Requires Prompting: (P) Yes  Requires assistance for routine expression?: (P) No  Cognitive or behavioral barriers impacting ability to self-manage diabetes?: (P) Yes (Dx of vascular dementia)    Culture/Jainism  Culture or Episcopalian beliefs that may impact ability to access healthcare: (P) No    Communication  Language preference: (P) English  Hearing Problems: (P) No  Vision Problems: (P) No  Communication needs impacting ability to self-manage diabetes?: (P) No    Health Literacy  Preferred Learning Method: (P) Face to Face, Hands On, Demonstration  How often do you need to have someone help you " read instructions, pamphlets, or written material from your doctor or pharmacy?: (P) Often  Health literacy needs impacting ability to self-manage diabetes?: (P) Yes      Diabetes Self-Management Skills Assessment    Diabetes Disease Process/Treatment Options  Patient/caregiver able to state what happens when someone has diabetes.: no  Patient/caregiver knows what type of diabetes they have.: no  Patient/caregiver able to identify at least three signs and symptoms of diabetes.: no  Patient able to identify at least three risk factors for diabetes.: no  Diabetes Disease Process/Treatment Options: Skills Assessment Completed: Yes  Assessment indicates:: Instruction Needed  Area of need?: Yes    Nutrition/Healthy Eating  Challenges to healthy eating:: eating out, going to parties, snacking between meals and at night  Method of carbohydrate measurement:: no method  Patient can identify foods that impact blood sugar.: no (see comments)  Nutrition/Healthy Eating Skills Assessment Completed:: Yes  Assessment indicates:: Instruction Needed, Knowledge deficit  Area of need?: Yes    Physical Activity/Exercise  Patient's daily activity level:: sedentary  Patient formally exercises outside of work.: no  Reasons for not exercising:: physically unable to exercise currently (vascular dementia diagnosis)  Patient can identify forms of physical activity.: yes  Stated forms of physical activity:: any movement performed by muscles that uses energy  Patient can identify reasons why exercise/physical activity is important in diabetes management.: no  Physical Activity/Exercise Skills Assessment Completed: : Yes  Assessment indicates:: Adequate understanding  Area of need?: No    Medications  Patient is able to describe current diabetes management routine.: no  Patient is able to identify current diabetes medications, dosages, and appropriate timing of medications.: no  Patient understands the purpose of the medications taken for  "diabetes.: yes  Patient reports problems or concerns with current medication regimen.: yes  Medication regimen problems/concerns:: does not feel like regimen is working, other (see comments) (Per  "sometimes her blood sugars are high and sometimes they are not.")  Medication Skills Assessment Completed:: Yes  Assessment indicates:: Instruction Needed, Knowledge deficit  Area of need?: Yes    Home Blood Glucose Monitoring  Patient states that blood sugar is checked at home daily.: yes  How often do you check your blood sugar?: Twice a day  When do you check your blood sugar?: Before breakfast, Before dinner  When you check what is your typical blood sugar range? : 110-300 mg/dl.  reports once it read 400 mg/dl  Blood glucose logs:: no, encouraged to keep logs, encouraged to bring logs to provider visits  Blood glucose logs reviewed today?: no  Home Blood Glucose Monitoring Skills Assessment Completed: : Yes  Assessment indicates:: Instruction Needed  Area of need?: Yes (Here today to learn how to use Dexcom)    Acute Complications  Acute Complications Skills Assessment Completed: : No  Deffered due to:: Time    Chronic Complications  Chronic Complications Skills Assessment Completed: : No  Deferred due to:: Time    Psychosocial/Coping  Psychosocial/Coping Skills Assessment Completed: : No  Deffered due to:: Time      Assessment Summary and Plan    Based on today's diabetes care assessment, the following areas of need were identified:          3/14/2024    12:01 AM   Social   Support No   Access to Mass Media/Tech No   Cognitive/Behavioral Health Yes, written instruction for medication administration and Dexcom sensor changes provided.   Culture/Jain No   Communication No   Health Literacy Yes, written instruction for medication administration and Dexcom sensor changes provided.            3/14/2024    12:01 AM   Clinical   Medication Adherence Yes. Written instructions for medications including " inject 5 units of insulin for BG above 70 mg/dl once daily and at the same time each day.   Lab Compliance No   Nutritional Status Yes. Patient has diagnosed malnutrition, but she eats three meals daily and snacks. She is underweight but it is currently stable.              3/14/2024    12:01 AM   Diabetes Self-Management Skills   Diabetes Disease Process/Treatment Options Yes. See care plan   Nutrition/Healthy Eating Yes, see care plan   Physical Activity/Exercise No   Medication Yes, see care plan   Home Blood Glucose Monitoring Yes, see care plan          Today's interventions were provided through individual discussion, instruction, and written materials were provided.      Patient verbalized understanding of instruction and written materials.  Pt was able to return back demonstration of instructions today. Patient understood key points, needs reinforcement and further instruction.     Diabetes Self-Management Care Plan:    Today's Diabetes Self-Management Care Plan was developed with Marianne's input. Marianne has agreed to work toward the following goal(s) to improve his/her overall diabetes control.      Care Plan: Diabetes Management   Updates made since 2/14/2024 12:00 AM        Problem: Medications         Goal: Patient Agrees to take 5 units of Lantus, Januvia, and Metformin if glucose reading is above 70 mg/dl    Start Date: 3/14/2024   Expected End Date: 4/5/2024   Priority: High   Barriers: Cognitive Deficits   Note:    Patient's  reports son administers the insulin and he does not even know what dose.  Wrote the timing and dose amount on BG log.  Instructed to give Lantus if BG is above 70 mg/dl.  Discussed importance of keeping hydrated with Jardiance to prevent side effects.       Task: Reviewed with patient all current diabetes medications and provided basic review of the purpose, dosage, frequency, side effects, and storage of both oral and injectable diabetes medications. Completed 3/15/2024         Task: Reviewed possible resources for acquiring cost prohibitive medication.         Task: Instructed patient on how to self-administer         Task: Discussed guidelines for preventing, detecting and treating hypoglycemia and hyperglycemia and reviewed the importance of meal and medication timing with diabetes mediations for prevention of hypoglycemia and maximum drug benefit. Completed 3/15/2024        Problem: Healthy Eating         Goal: Do not drink sugar sweetened beverages or juice.    Start Date: 3/14/2024   Expected End Date: 4/5/2024   Priority: Medium   Barriers: Cognitive Deficits        Task: Reviewed the sources and role of Carbohydrate, Protein, and Fat and how each nutrient impacts blood sugar.         Task: Provided visual examples using dry measuring cups, food models, and other familiar objects such as computer mouse, deck or cards, tennis ball etc. to help with visualization of portions.         Task: Explained how to count carbohydrates using the food label and the use of dry measuring cups for accurate carb counting.         Task: Discussed strategies for choosing healthier menu options when dining out. Completed 3/15/2024        Task: Recommended replacing beverages containing high sugar content with noncaloric/sugar free options and/or water. Completed 3/15/2024        Task: Review the importance of balancing carbohydrates with each meal using portion control techniques to count servings of carbohydrate and label reading to identify serving size and amount of total carbs per serving.         Task: Provided Sample plate method and reviewed the use of the plate to estimate amounts of carbohydrate per meal.         Problem: Blood Glucose Self-Monitoring         Goal: Patient agrees to use Dexcom G7 and  to monitor glucose readings.    Start Date: 3/14/2024   Expected End Date: 4/5/2024   Priority: Medium   Barriers: Cognitive Deficits   Note:    PLACEMENT OF DEXCOM G7 PERSONAL  "CONTINOUS GLUCOSE MONITORING SYSTEM (CGMS)     REFERRING PROVIDER: Jo Ann Haynes NP    Explained to patient the difference between sensor glucose and blood (capillary) glucose and how these 2 readings may not be the same.     Patient is here in clinic today for placement of personal continuous glucose monitoring system (CGMS).   Patient has Dexcom G7 , Sensors, & Transmitter. Patient will use  to obtain continuous glucose readings.  Patient verbalizes understanding to change sensor every 10 days. Patient and  are also aware that each time a new sensor is placed there is a 30 warm up period. No calibration is necessary however patient can calibrate if he or she desires.  How ever, if patient calibrates Dexcom sensor, it is recommended to only calibrate once during the 10-day sensor wear as sensor is factory calibrated.  Patient understands to avoid calibration when glucose levels changing rapidly.      Discussed Dexcom G7 supplies with patient--company/pharmacy to reorder items and who to call (Dexcom technical support) if a sensor/transmitter fails.  If using Dexcom G7 mobile phone stephen, patient educated to leave stephen running in the background (do not swipe off completely) to prevent gaps in CGMS tracings.       A detailed explanation of Dexcom G7 Continuous Glucose Monitoring System was provided. Reviewed the Dexcom "Getting Started Guide" with patient and they has a written copy for home use.    Patient was allowed to practice, and time allowed to ask questions. Patient will notify Endocrinology if glucose levels are above 250 mg/dL consistently or if episode of glucose less than 70 mg/dL presents.  Patient verbalizes understanding.         High alert set at 320 mg/dL, snooze off  Low alert set at 75 mg/dL, snooze 15 minutes  High rising alert: off  Low dropping alert: off  Signal Loss: On  Brief Sensor Issue: ON    An appropriate site was selected and prepared. Patient inserted sensor " into right upper arm. Sensor will be changed by patient every 10 days.   Patient has set up personal Dexcom account, and linked data sharing to [facility] if using Dexcom mobile stephen.  All questions answered.       [facility]       Task: Provided patient with a meter today and sent Rx request to provider to send to patients pharmacy.         Task: Reviewed the importance of self-monitoring blood glucose and keeping logs.         Task: Instructed on how to self-monitor blood glucose using a home glucometer, how to properly dispose of used strips and lancets after use, and how to appropriately store meter and supplies.         Task: Provided patient with blood glucose logs, reviewed appropriate timing and frequency to SMBG, education on parameters on when to notify provider and advised patient to bring logs to all appts with PCP/Endocrinologist/Diabetes Care Specialist.         Task: Discussed when it is required to use BG readings: when readings do not match how she feels, and hypoglycemia.         Problem: Disease Process         Goal: Patient agrees to take steps toward understanding the diabetes disease process and treatment options by using Dexcom to monitor glucose levels, stop drinking beverages with sugar, and to take medication as prescribed.    Start Date: 3/14/2024   Expected End Date: 4/4/2024   Priority: Low   Barriers: Cognitive Deficits   Note:    Patient experiencing intermittent frequent urination, thirst, blurry vision.       Task: Provided a basic introduction of the diabetes disease process, diagnosis, progression, and how diabetes can be successfully managed. Completed 3/15/2024        Task: Reviewed the following risk factors associated with diabetes: Being overweight, family history, reduced activity, ethnicity, age over 40, past history of Gestational DM         Task: Reviewed the following common signs and symptoms of diabetes:  Increased Thirst, Frequent Urination, Fatigue,  "Sexual Dysfunction, Blurred Vision, Frequent and Slow healing of infections Completed 3/15/2024        Task: Reviewed different types of diabetes Type 1, Type 2, Gestational, Prediabetes, and other forms, and described how each type is managed and reviewed different myths and stereotypes commonly associated with diabetes.         Task: Emphasized on the importance of controlling diabetes to prevent complications and reviewed both the short-term and long-term effects of uncontrolled diabetes. Completed 3/15/2024          Follow Up Plan     Follow up in about 3 weeks (around 4/4/2024).  Patient and  present today to learn how to use Dexcom G7 with  to monitor glucose readings.  Wife has vascular dementia.  Patient's  reports their son administers the Lantus, but he does not know what dose she takes.  She will not be give Lantus if her BG is "low."  The couple eats out daily at fast food and patient has a "sweet tooth."  First focus is to stop patient from drinking sweet tea and lemonade at fast food restaurants.        Order for Dexcom G7 recent to [facility] today due to cost of Dexcom sensor at pharmacy.    Today's care plan and follow up schedule was discussed with patient.  Marianne verbalized understanding of the care plan, goals, and agrees to follow up plan.        The patient was encouraged to communicate with his/her health care provider/physician and care team regarding his/her condition(s) and treatment.  I provided the patient with my contact information today and encouraged to contact me via phone or [facility] Patient Portal as needed.     Length of Visit   Total Time: 70 Minutes     "

## 2024-04-01 ENCOUNTER — LAB VISIT (OUTPATIENT)
Dept: LAB | Facility: HOSPITAL | Age: 79
End: 2024-04-01
Payer: MEDICARE

## 2024-04-01 DIAGNOSIS — N18.31 TYPE 2 DIABETES MELLITUS WITH STAGE 3A CHRONIC KIDNEY DISEASE, WITHOUT LONG-TERM CURRENT USE OF INSULIN: ICD-10-CM

## 2024-04-01 DIAGNOSIS — E11.22 TYPE 2 DIABETES MELLITUS WITH STAGE 3A CHRONIC KIDNEY DISEASE, WITHOUT LONG-TERM CURRENT USE OF INSULIN: ICD-10-CM

## 2024-04-01 LAB
ANION GAP SERPL CALC-SCNC: 11 MMOL/L (ref 8–16)
BUN SERPL-MCNC: 39 MG/DL (ref 8–23)
CALCIUM SERPL-MCNC: 9.6 MG/DL (ref 8.7–10.5)
CHLORIDE SERPL-SCNC: 106 MMOL/L (ref 95–110)
CO2 SERPL-SCNC: 21 MMOL/L (ref 23–29)
CREAT SERPL-MCNC: 1.4 MG/DL (ref 0.5–1.4)
EST. GFR  (NO RACE VARIABLE): 38.7 ML/MIN/1.73 M^2
GLUCOSE SERPL-MCNC: 192 MG/DL (ref 70–110)
POTASSIUM SERPL-SCNC: 4.6 MMOL/L (ref 3.5–5.1)
SODIUM SERPL-SCNC: 138 MMOL/L (ref 136–145)

## 2024-04-01 PROCEDURE — 36415 COLL VENOUS BLD VENIPUNCTURE: CPT | Mod: PO | Performed by: NURSE PRACTITIONER

## 2024-04-01 PROCEDURE — 86337 INSULIN ANTIBODIES: CPT | Performed by: NURSE PRACTITIONER

## 2024-04-01 PROCEDURE — 80048 BASIC METABOLIC PNL TOTAL CA: CPT | Performed by: NURSE PRACTITIONER

## 2024-04-01 PROCEDURE — 84681 ASSAY OF C-PEPTIDE: CPT | Performed by: NURSE PRACTITIONER

## 2024-04-01 PROCEDURE — 86341 ISLET CELL ANTIBODY: CPT | Performed by: NURSE PRACTITIONER

## 2024-04-02 LAB — C PEPTIDE SERPL-MCNC: 2.22 NG/ML (ref 0.78–5.19)

## 2024-04-04 ENCOUNTER — NUTRITION (OUTPATIENT)
Dept: DIABETES | Facility: CLINIC | Age: 79
End: 2024-04-04
Payer: MEDICARE

## 2024-04-04 VITALS — BODY MASS INDEX: 19.27 KG/M2 | HEIGHT: 69 IN

## 2024-04-04 DIAGNOSIS — N18.31 TYPE 2 DIABETES MELLITUS WITH STAGE 3A CHRONIC KIDNEY DISEASE, WITHOUT LONG-TERM CURRENT USE OF INSULIN: ICD-10-CM

## 2024-04-04 DIAGNOSIS — E11.22 TYPE 2 DIABETES MELLITUS WITH STAGE 3A CHRONIC KIDNEY DISEASE, WITHOUT LONG-TERM CURRENT USE OF INSULIN: ICD-10-CM

## 2024-04-04 LAB
GAD65 AB SER-SCNC: 0 NMOL/L
INSULIN AB SER-SCNC: 0 NMOL/L (ref 0–0.02)

## 2024-04-04 PROCEDURE — 99999 PR PBB SHADOW E&M-EST. PATIENT-LVL I: CPT | Mod: PBBFAC,,, | Performed by: DIETITIAN, REGISTERED

## 2024-04-04 PROCEDURE — G0108 DIAB MANAGE TRN  PER INDIV: HCPCS | Mod: S$GLB,,, | Performed by: DIETITIAN, REGISTERED

## 2024-04-04 NOTE — PROGRESS NOTES
"Diabetes Care Specialist Progress Note  Author: Kandace Phillips RD  Date: 4/5/2024    Program Intake  Reason for Diabetes Program Visit:: Initial Diabetes Assessment  Current diabetes risk level:: high  In the last 12 months, have you:: used emergency room services  Was the ER or hospital admission related to diabetes?: Yes (UTI and hyperglycemia)  Permission to speak with others about care:: yes (. Today a caregiver/family friend presents with patient.)  Continuous Glucose Monitoring  Patient has CGM: Yes  Personal CGM type:: Dexcom G7    Lab Results   Component Value Date    HGBA1C 11.5 (H) 01/19/2024       Clinical  Height: 5' 9" (175.3 cm)   Body mass index is 19.27 kg/m².    Problem Review  Reviewed Problem List with Patient: yes  Active comorbidities affecting diabetes self-care.: yes  Comorbidities: Malnutrition, Chronic Kidney Disease, Other (comment), Hypertension, Cardiovascular Disease (vascular dementia)  Reviewed health maintenance: yes    Clinical Assessment  Current Diabetes Treatment: Oral Medication, Insulin (Metformin 500 mg BID, Januvia 50 mg, Lantus 5 units)  Have you ever experienced hypoglycemia (low blood sugar)?: no  Have you ever experienced hyperglycemia (high blood sugar)?: yes    Medication Information  How many days a week do you miss your medications?:  (Not testing BG despite Dexcom  was lost.)  Do you sometimes have difficulty refilling your medications?: No    Additional Social History  Support  Does anyone support you with your diabetes care?: yes  Who supports you?: spouse, son/daughter, family member  Who takes you to your medical appointments?: spouse, family member  Does the current support meet the patient's needs?: Yes  Is Support an area impacting ability to self-manage diabetes?: No    Cognitive/Behavioral Health  Alert and Oriented: Yes  Difficulty Thinking: Yes  Requires Prompting: No  Requires assistance for routine expression?: No  Cognitive or " behavioral barriers impacting ability to self-manage diabetes?: Yes (vascular dementia)    Culture/Temple  Culture or Bahai beliefs that may impact ability to access healthcare: No    Communication  Language preference: English  Hearing Problems: No  Vision Problems: No  Communication needs impacting ability to self-manage diabetes?: No    Health Literacy  Preferred Learning Method: Face to Face, Hands On, Demonstration      Diabetes Self-Management Skills Assessment  Nutrition/Healthy Eating  Challenges to healthy eating:: eating out, going to parties, snacking between meals and at night  Method of carbohydrate measurement:: no method  Patient can identify foods that impact blood sugar.: no (see comments)  Nutrition/Healthy Eating Skills Assessment Completed:: Yes  Assessment indicates:: Knowledge deficit  Area of need?: Yes    Home Blood Glucose Monitoring  Patient states that blood sugar is checked at home daily.: no  Reasons for not monitoring:: device lost or stolen  Blood glucose logs reviewed today?: no  Personal CGM type:: Dexcom G7  Home Blood Glucose Monitoring Skills Assessment Completed: : Yes  Assessment indicates:: Instruction Needed  Area of need?: Yes    Acute Complications  Patient is able to identify types of acute complications: No  Acute Complications Skills Assessment Completed: : Yes  Assessment indicates:: Instruction Needed  Area of need?: Yes    Chronic Complications  Chronic Complications Skills Assessment Completed: : No  Deferred due to:: Time    Psychosocial/Coping  Psychosocial/Coping Skills Assessment Completed: : No  Deffered due to:: Time      Assessment Summary and Plan    Based on today's diabetes care assessment, the following areas of need were identified:          4/4/2024    12:01 AM   Social   Support No   Cognitive/Behavioral Health Yes   Culture/Temple No   Communication No            3/14/2024    12:01 AM   Clinical   Medication Adherence Yes, reviewed importance of  taking medication as prescribed, how Lantus works, and that it should always be taken. If BG is low, use rule of 15/15 to increase it to WNL then take Lantus.   Lab Compliance No   Nutritional Status Yes, intake is stable. Just asked patient's caregiver to not provide sugar sweetened beverages.            4/4/2024    12:01 AM   Diabetes Self-Management Skills   Nutrition/Healthy Eating Yes, see care plan   Home Blood Glucose Monitoring Yes, see care plan   Acute Complications Yes, see care plan          Today's interventions were provided through individual discussion, instruction, and written materials were provided.      Patient verbalized understanding of instruction and written materials.  Pt was able to return back demonstration of instructions today. Patient understood key points, needs reinforcement and further instruction.     Diabetes Self-Management Care Plan:    Today's Diabetes Self-Management Care Plan was developed with Marianne's input. Marianne has agreed to work toward the following goal(s) to improve his/her overall diabetes control.      Care Plan: Diabetes Management   Updates made since 3/6/2024 12:00 AM        Problem: Medications         Long-Range Goal: Patient Agrees to take 5 units of Lantus, Januvia, and Metformin if glucose reading is above 70 mg/dl    Start Date: 3/14/2024   Expected End Date: 6/7/2024   This Visit's Progress: Not met   Priority: High   Barriers: Cognitive Deficits   Note:    4/5/24: Family friend is here today and she is not involved with patient medication administration.  Explained that Lantus can be given at any BG reading above 70 mg/dl. Explained how Lantus worked.    3/14/24: Patient's  reports son administers the insulin and he does not even know what dose.  Wrote the timing and dose amount on BG log.  Instructed to give Lantus if BG is above 70 mg/dl.  Discussed importance of keeping hydrated with Jardiance to prevent side effects.       Task: Reviewed with  patient all current diabetes medications and provided basic review of the purpose, dosage, frequency, side effects, and storage of both oral and injectable diabetes medications. Completed 3/15/2024        Task: Reviewed possible resources for acquiring cost prohibitive medication.         Task: Instructed patient on how to self-administer         Task: Discussed guidelines for preventing, detecting and treating hypoglycemia and hyperglycemia and reviewed the importance of meal and medication timing with diabetes mediations for prevention of hypoglycemia and maximum drug benefit. Completed 3/15/2024        Problem: Healthy Eating         Goal: Do not drink sugar sweetened beverages or juice.    Start Date: 3/14/2024   Expected End Date: 6/7/2024   This Visit's Progress: Not met   Priority: Medium   Barriers: Cognitive Deficits   Note:    Reiterated this sheer importance of patient no longer drinking sugary drinks.  Explained the large effect on BG.       Task: Reviewed the sources and role of Carbohydrate, Protein, and Fat and how each nutrient impacts blood sugar.         Task: Provided visual examples using dry measuring cups, food models, and other familiar objects such as computer mouse, deck or cards, tennis ball etc. to help with visualization of portions.         Task: Explained how to count carbohydrates using the food label and the use of dry measuring cups for accurate carb counting.         Task: Discussed strategies for choosing healthier menu options when dining out. Completed 3/15/2024        Task: Recommended replacing beverages containing high sugar content with noncaloric/sugar free options and/or water. Completed 3/15/2024        Task: Review the importance of balancing carbohydrates with each meal using portion control techniques to count servings of carbohydrate and label reading to identify serving size and amount of total carbs per serving.         Task: Provided Sample plate method and reviewed  the use of the plate to estimate amounts of carbohydrate per meal.         Problem: Blood Glucose Self-Monitoring         Long-Range Goal: Patient agrees to use Dexcom G7 and  to monitor glucose readings.    Start Date: 3/14/2024   Expected End Date: 6/7/2024   This Visit's Progress: Not met   Priority: Medium   Barriers: Cognitive Deficits   Note:    4/4/24: Patient has lost  and supplies.  Explained that [facility] has RX for refills. Patient most likely has been contacted but never had phone call returned due to unknown number to authorize shipment. Provided a phone number to [facility]. Offered repeat Dexcom training with the request that a caregiver presents with the patient.    PLACEMENT OF DEXCOM G7 PERSONAL CONTINOUS GLUCOSE MONITORING SYSTEM (CGMS)     REFERRING PROVIDER: Jo Ann Haynes NP    Explained to patient the difference between sensor glucose and blood (capillary) glucose and how these 2 readings may not be the same.     Patient is here in clinic today for placement of personal continuous glucose monitoring system (CGMS).   Patient has Dexcom G7 , Sensors, & Transmitter. Patient will use  to obtain continuous glucose readings.  Patient verbalizes understanding to change sensor every 10 days. Patient and  are also aware that each time a new sensor is placed there is a 30 warm up period. No calibration is necessary however patient can calibrate if he or she desires.  How ever, if patient calibrates Dexcom sensor, it is recommended to only calibrate once during the 10-day sensor wear as sensor is factory calibrated.  Patient understands to avoid calibration when glucose levels changing rapidly.      Discussed Dexcom G7 supplies with patient--company/pharmacy to reorder items and who to call (Dexcom technical support) if a sensor/transmitter fails.  If using Dexcom G7 mobile phone stephen, patient educated to leave stephen running in the background (do not swipe off  "completely) to prevent gaps in CGMS tracings.       A detailed explanation of Dexcom G7 Continuous Glucose Monitoring System was provided. Reviewed the Dexcom "Getting Started Guide" with patient and they has a written copy for home use.    Patient was allowed to practice, and time allowed to ask questions. Patient will notify Endocrinology if glucose levels are above 250 mg/dL consistently or if episode of glucose less than 70 mg/dL presents.  Patient verbalizes understanding.         High alert set at 320 mg/dL, snooze off  Low alert set at 75 mg/dL, snooze 15 minutes  High rising alert: off  Low dropping alert: off  Signal Loss: On  Brief Sensor Issue: ON    An appropriate site was selected and prepared. Patient inserted sensor into right upper arm. Sensor will be changed by patient every 10 days.   Patient has set up personal Dexcom account, and linked data sharing to [facility] if using Dexcom mobile stephen.  All questions answered.       [facility]       Task: Provided patient with a meter today and sent Rx request to provider to send to patients pharmacy.         Task: Reviewed the importance of self-monitoring blood glucose and keeping logs.         Task: Instructed on how to self-monitor blood glucose using a home glucometer, how to properly dispose of used strips and lancets after use, and how to appropriately store meter and supplies.         Task: Provided patient with blood glucose logs, reviewed appropriate timing and frequency to SMBG, education on parameters on when to notify provider and advised patient to bring logs to all appts with PCP/Endocrinologist/Diabetes Care Specialist.         Task: Discussed when it is required to use BG readings: when readings do not match how she feels, and hypoglycemia. Completed 4/4/2024        Problem: Disease Process         Goal: Patient agrees to take steps toward understanding the diabetes disease process and treatment options by using Dexcom to " monitor glucose levels, stop drinking beverages with sugar, and to take medication as prescribed.    Start Date: 3/14/2024   Expected End Date: 6/7/2024   Priority: Low   Barriers: Cognitive Deficits   Note:    Patient experiencing intermittent frequent urination, thirst, blurry vision.  Explained how increased BG will cause increase risk of UTIs and other infections.  Also infections will lead to further hyperglycemia and has played role in past hospitalizations.       Task: Provided a basic introduction of the diabetes disease process, diagnosis, progression, and how diabetes can be successfully managed. Completed 3/15/2024        Task: Reviewed the following risk factors associated with diabetes: Being overweight, family history, reduced activity, ethnicity, age over 40, past history of Gestational DM         Task: Reviewed the following common signs and symptoms of diabetes:  Increased Thirst, Frequent Urination, Fatigue, Sexual Dysfunction, Blurred Vision, Frequent and Slow healing of infections Completed 3/15/2024        Task: Reviewed different types of diabetes Type 1, Type 2, Gestational, Prediabetes, and other forms, and described how each type is managed and reviewed different myths and stereotypes commonly associated with diabetes.         Task: Emphasized on the importance of controlling diabetes to prevent complications and reviewed both the short-term and long-term effects of uncontrolled diabetes. Completed 3/15/2024        Problem: Acute Complications         Long-Range Goal: Patient agrees to identify and manage signs and symptoms of high/low blood sugar (hyper/hypoglycemia) by keeping a log of events and using proper treatment.    Start Date: 4/4/2024   Expected End Date: 6/7/2024   Priority: High   Barriers: Cognitive Deficits   Note:    Explained increased BG can lead to increased infection risk and hospitalization.  Reviewed how to treat hypoglycemia.  Explained high and low BG alerts were set  on Dexcom.         Task: Reviewed proper treatment of hypoglycemia with the rule of 15--patient to eat 15g simple carbohydrate (4 glucose tablets, 1 glucose gel, 5 pieces hard candy, ½ cup fruit juice, ½ can regular soda, etc) and wait 15 minutes and recheck home glucose. Completed 4/4/2024        Task: Reviewed common causes and precautions to help prevent hyper/hypoglycemic events. Completed 4/4/2024        Task: Reviewed signs and symptoms of hyper/hypoglycemia, what range is considered to be hyper/hypoglycemia, and when to seek further medical attention. Completed 4/4/2024        Task: Discussed, sick day planning, natural disaster planning, and/or travel planning to prevent hyper/hypoglycemia.         Task: Discussed risk factors for developing diabetic ketoacidosis (DKA), strategies for reducing risk, testing with ketone test strips if BG is >240mg/dl, basic protocol for managing DKA, and when to seek further medical attention.           Follow Up Plan     Follow up when Dexcom supplies are received from [facility], for repeat Dexcom training..  Patient presents with family friend who is not her routine caregiver.  There was an obvious misunderstanding as to why patient presented today because all questions were directed towards her dementia.  I explained that they should reach out to her PCP to address falls and dementia.      Explained the importance of contacting [facility] so she can get her needed Dexcom supplies.  Offered repeat training with hopes of a caregiver who can place sensor come along since patient and  were the only ones to present for initial training.  Explained the importance of the Dexcom G7 or BG log is for KATELYN Haynes NP to be able to adjust medications.  Explained the role of uncontrolled hyperglycemia plays in increased infections, hospitalizations, and cognition.    Today's care plan and follow up schedule was discussed with patient.  Marianne verbalized understanding of the care  plan, goals, and agrees to follow up plan.        The patient was encouraged to communicate with his/her health care provider/physician and care team regarding his/her condition(s) and treatment.  I provided the patient with my contact information today and encouraged to contact me via phone or [facility] as needed.     Length of Visit   Total Time: 40 Minutes

## 2024-04-11 ENCOUNTER — LAB VISIT (OUTPATIENT)
Dept: LAB | Facility: HOSPITAL | Age: 79
End: 2024-04-11
Attending: NURSE PRACTITIONER
Payer: MEDICARE

## 2024-04-11 ENCOUNTER — OFFICE VISIT (OUTPATIENT)
Dept: DIABETES | Facility: CLINIC | Age: 79
End: 2024-04-11
Payer: MEDICARE

## 2024-04-11 VITALS
BODY MASS INDEX: 19.08 KG/M2 | DIASTOLIC BLOOD PRESSURE: 65 MMHG | SYSTOLIC BLOOD PRESSURE: 106 MMHG | WEIGHT: 128.81 LBS | HEART RATE: 72 BPM | HEIGHT: 69 IN

## 2024-04-11 DIAGNOSIS — E11.22 TYPE 2 DIABETES MELLITUS WITH STAGE 3A CHRONIC KIDNEY DISEASE, WITHOUT LONG-TERM CURRENT USE OF INSULIN: Primary | ICD-10-CM

## 2024-04-11 DIAGNOSIS — N18.31 TYPE 2 DIABETES MELLITUS WITH STAGE 3A CHRONIC KIDNEY DISEASE, WITHOUT LONG-TERM CURRENT USE OF INSULIN: ICD-10-CM

## 2024-04-11 DIAGNOSIS — N18.31 TYPE 2 DIABETES MELLITUS WITH STAGE 3A CHRONIC KIDNEY DISEASE, WITHOUT LONG-TERM CURRENT USE OF INSULIN: Primary | ICD-10-CM

## 2024-04-11 DIAGNOSIS — E11.22 TYPE 2 DIABETES MELLITUS WITH STAGE 3A CHRONIC KIDNEY DISEASE, WITHOUT LONG-TERM CURRENT USE OF INSULIN: ICD-10-CM

## 2024-04-11 LAB
ALBUMIN/CREAT UR: 44.5 UG/MG (ref 0–30)
ANION GAP SERPL CALC-SCNC: 12 MMOL/L (ref 8–16)
BUN SERPL-MCNC: 33 MG/DL (ref 8–23)
CALCIUM SERPL-MCNC: 9.8 MG/DL (ref 8.7–10.5)
CHLORIDE SERPL-SCNC: 107 MMOL/L (ref 95–110)
CO2 SERPL-SCNC: 22 MMOL/L (ref 23–29)
CREAT SERPL-MCNC: 1.3 MG/DL (ref 0.5–1.4)
CREAT UR-MCNC: 128 MG/DL (ref 15–325)
EST. GFR  (NO RACE VARIABLE): 42.4 ML/MIN/1.73 M^2
ESTIMATED AVG GLUCOSE: 197 MG/DL (ref 68–131)
GLUCOSE SERPL-MCNC: 147 MG/DL (ref 70–110)
GLUCOSE SERPL-MCNC: 178 MG/DL (ref 70–110)
HBA1C MFR BLD: 8.5 % (ref 4–5.6)
MICROALBUMIN UR DL<=1MG/L-MCNC: 57 UG/ML
POTASSIUM SERPL-SCNC: 4.9 MMOL/L (ref 3.5–5.1)
SODIUM SERPL-SCNC: 141 MMOL/L (ref 136–145)

## 2024-04-11 PROCEDURE — 1159F MED LIST DOCD IN RCRD: CPT | Mod: CPTII,S$GLB,, | Performed by: NURSE PRACTITIONER

## 2024-04-11 PROCEDURE — 99214 OFFICE O/P EST MOD 30 MIN: CPT | Mod: S$GLB,,, | Performed by: NURSE PRACTITIONER

## 2024-04-11 PROCEDURE — 83036 HEMOGLOBIN GLYCOSYLATED A1C: CPT | Performed by: NURSE PRACTITIONER

## 2024-04-11 PROCEDURE — 3074F SYST BP LT 130 MM HG: CPT | Mod: CPTII,S$GLB,, | Performed by: NURSE PRACTITIONER

## 2024-04-11 PROCEDURE — 80048 BASIC METABOLIC PNL TOTAL CA: CPT | Performed by: NURSE PRACTITIONER

## 2024-04-11 PROCEDURE — 3078F DIAST BP <80 MM HG: CPT | Mod: CPTII,S$GLB,, | Performed by: NURSE PRACTITIONER

## 2024-04-11 PROCEDURE — 82043 UR ALBUMIN QUANTITATIVE: CPT | Performed by: NURSE PRACTITIONER

## 2024-04-11 PROCEDURE — 99999 PR PBB SHADOW E&M-EST. PATIENT-LVL III: CPT | Mod: PBBFAC,,, | Performed by: NURSE PRACTITIONER

## 2024-04-11 PROCEDURE — 82962 GLUCOSE BLOOD TEST: CPT | Mod: S$GLB,,, | Performed by: NURSE PRACTITIONER

## 2024-04-11 PROCEDURE — 1126F AMNT PAIN NOTED NONE PRSNT: CPT | Mod: CPTII,S$GLB,, | Performed by: NURSE PRACTITIONER

## 2024-04-11 PROCEDURE — 36415 COLL VENOUS BLD VENIPUNCTURE: CPT | Mod: PO | Performed by: NURSE PRACTITIONER

## 2024-04-11 RX ORDER — BLOOD-GLUCOSE SENSOR
1 EACH MISCELLANEOUS
Qty: 3 EACH | Refills: 11 | Status: SHIPPED | OUTPATIENT
Start: 2024-04-11 | End: 2025-04-11

## 2024-04-11 NOTE — PROGRESS NOTES
Marianne Cali is a 78 y.o. female who  has a past medical history of Allergy, Cataract, Dementia, Depression, Diabetes mellitus, type 2, Heart murmur, Hypertension, and Stroke. and presents for a follow up evaluation of Type 2 diabetes mellitus.     CHIEF COMPLAINT: Diabetes Consultation    PCP: Antoni Patel MD     Initial visit with me - 2/29/2024    The patient was initially diagnosed with diabetes 6 years ago.      Previous failed treatments include:  None.     Social Documentation:  Patient lives in [place] with .   Occupation: retired.   Exercise: limited due to health problems.      Diabetes related complications:   cerebrovascular disease.   denies Pancreatitis  denies Gastroparesis  denies DKA  denies Hx/family Hx of MEN2/MTC  reports Frequent UTIs/yeast infections     Cardiovascular Risk Factors: advanced age (>55 for men, >65 for women), dyslipidemia, hypertension, and sedentary lifestyle.     Diabetes Medications               insulin (LANTUS SOLOSTAR U-100 INSULIN) glargine 100 units/mL SubQ pen Inject 5 Units into the skin every evening.    JANUVIA 50 mg Tab Take 1 tablet (50 mg total) by mouth once daily.    metFORMIN (GLUCOPHAGE) 500 MG tablet Take 1 tablet (500 mg total) by mouth 2 (two) times daily with meals.     Current monitoring regimen: capillary blood glucose monitoring with finger sticks.  Has G7 . Order send to [facility] for sensors due to higher copay at pharmacy. Per [facility] they will be shipped today.    Patient currently taking insulin or sulfonylurea?  Yes. Emergency Glucagon Prescription current? no  Recent hypoglycemic episodes: No.     Patient compliant with glucose checks and medication administration? Yes    DIABETES MANAGEMENT STATUS  Statin: Taking  ACE/ARB: Taking  Screening or Prevention Patient's value Goal Complete/Controlled?   HgA1C Testing and Control   Lab Results   Component Value Date    HGBA1C 11.5 (H) 01/19/2024      Annually/Less than 8% No   Lipid  profile : 01/19/2024 Annually Yes   LDL control Lab Results   Component Value Date    LDLCALC 99.0 01/19/2024    Annually/Less than 100 mg/dl  Yes   Nephropathy screening Lab Results   Component Value Date    LABMICR 55.0 07/22/2022     Lab Results   Component Value Date    PROTEINUA Trace (A) 01/19/2024     Lab Results   Component Value Date    UTPCR 0.12 04/26/2023      Annually Yes   Blood pressure BP Readings from Last 1 Encounters:   04/11/24 106/65    Less than 140/90 Yes   Dilated retinal exam Most Recent Eye Exam Date: Not Found Annually No   Foot exam   Most Recent Foot Exam Date: Not Found Annually No   Patient's medications, allergies, surgical, social and family histories were reviewed and updated as appropriate.     Review of Systems   Constitutional:  Negative for weight loss.   Eyes:  Negative for blurred vision and double vision.   Cardiovascular:  Negative for chest pain.   Gastrointestinal:  Negative for nausea and vomiting.   Genitourinary:  Negative for frequency.   Musculoskeletal:  Negative for falls.   Neurological:  Negative for dizziness and weakness.   Endo/Heme/Allergies:  Negative for polydipsia.   Psychiatric/Behavioral:  Negative for depression.    All other systems reviewed and are negative.       Physical Exam  Constitutional:       Appearance: Normal appearance.   HENT:      Head: Normocephalic and atraumatic.   Eyes:      Extraocular Movements: Extraocular movements intact.      Pupils: Pupils are equal, round, and reactive to light.   Cardiovascular:      Rate and Rhythm: Normal rate and regular rhythm.      Heart sounds: Normal heart sounds.   Pulmonary:      Effort: Pulmonary effort is normal. No respiratory distress.      Breath sounds: Normal breath sounds.   Musculoskeletal:         General: No swelling.      Cervical back: Normal range of motion.   Skin:     General: Skin is warm and dry.   Neurological:      Mental Status: She is alert and oriented to person, place, and  "time.   Psychiatric:         Mood and Affect: Mood normal.         Behavior: Behavior normal.        Blood pressure 106/65, pulse 72, height 5' 9" (1.753 m), weight 58.4 kg (128 lb 12.8 oz).  Wt Readings from Last 3 Encounters:   04/11/24 58.4 kg (128 lb 12.8 oz)   03/14/24 59.2 kg (130 lb 8 oz)   03/04/24 58 kg (127 lb 12.8 oz)       LAB REVIEW  Lab Results   Component Value Date     04/01/2024    K 4.6 04/01/2024     04/01/2024    CO2 21 (L) 04/01/2024    BUN 39 (H) 04/01/2024    CREATININE 1.4 04/01/2024    CALCIUM 9.6 04/01/2024    ANIONGAP 11 04/01/2024    EGFRNORACEVR 38.7 (A) 04/01/2024     Lab Results   Component Value Date    CPEPTIDE 2.22 04/01/2024    GLUTAMICACID 0.00 04/01/2024     Hemoglobin A1C   Date Value Ref Range Status   01/19/2024 11.5 (H) 4.0 - 5.6 % Final     Comment:     ADA Screening Guidelines:  5.7-6.4%  Consistent with prediabetes  >or=6.5%  Consistent with diabetes    High levels of fetal hemoglobin interfere with the HbA1C  assay. Heterozygous hemoglobin variants (HbS, HgC, etc)do  not significantly interfere with this assay.   However, presence of multiple variants may affect accuracy.     01/13/2024 11.6 (H) 4.6 - 6.2 % Final   08/21/2023 8.1 (H) 4.0 - 5.6 % Final     Comment:     ADA Screening Guidelines:  5.7-6.4%  Consistent with prediabetes  >or=6.5%  Consistent with diabetes    High levels of fetal hemoglobin interfere with the HbA1C  assay. Heterozygous hemoglobin variants (HbS, HgC, etc)do  not significantly interfere with this assay.   However, presence of multiple variants may affect accuracy.     04/20/2023 6.3 (H) 4.0 - 5.6 % Final     Comment:     ADA Screening Guidelines:  5.7-6.4%  Consistent with prediabetes  >or=6.5%  Consistent with diabetes    High levels of fetal hemoglobin interfere with the HbA1C  assay. Heterozygous hemoglobin variants (HbS, HgC, etc)do  not significantly interfere with this assay.   However, presence of multiple variants may affect " accuracy.         Lab Results   Component Value Date    POCGLU 178 (A) 2024       ASSESSMENT    ICD-10-CM ICD-9-CM   1. Type 2 diabetes mellitus with stage 3a chronic kidney disease, without long-term current use of insulin  E11.22 250.40    N18.31 585.3       PLAN  Diagnoses and all orders for this visit:    Type 2 diabetes mellitus with stage 3a chronic kidney disease, without long-term current use of insulin  -     POCT Glucose, Hand-Held Device  -     Basic Metabolic Panel; Future  -     Hemoglobin A1C; Future  -     Microalbumin/Creatinine Ratio, Urine; Future        Reviewed pathophysiology of diabetes, complications related to the disease, importance of annual dilated eye exam and daily foot examination. Explained MOA, SE, dosage of medications. Written instructions given and reviewed with patient and patient verbalizes understanding.      2024 - Has G7 . Order send to [facility] for sensors due to higher copay at pharmacy. Per [facility] they will be shipped today. Patient and  are poor historians and we would benefit from another family member being present during clinic visits. Their nephew has been helping with her CGM and insulin and I have requested if possible for him to come to her diabetes visit. Will check A1c today.     PATIENT INSTRUCTIONS      Labs today.   I do request that your nephew or daughter attend visits from here on to assist with care.     Continue Lantus 5 units subcutaneously daily.   OK to hold dose if glucose less than 120.      Decrease Metformin to 500 mg by mouth twice daily with meals. - GFR 43     Continue Januvia 50 mg by mouth daily.      Dexcom G7 CGM ordered.   Blood Sugar Goals:       Fastin-130.       1-2 hours after a meal: Less than 180.         Follow up in about 2 months (around 2024) for Dexcom, Schedule non-fasting labs.    Portions of this note were prepared with [employer] voice recognition transcription software.  Grammatical errors, including garbled syntax, mangle pronouns, and other bizarre constructions may be attributed to that software system.

## 2024-04-11 NOTE — PATIENT INSTRUCTIONS
PATIENT INSTRUCTIONS      Labs today.   I do request that your nephew or daughter attend visits from here on to assist with care.     Continue Lantus 5 units subcutaneously daily.   OK to hold dose if glucose less than 120.      Decrease Metformin to 500 mg by mouth twice daily with meals. - GFR 43     Continue Januvia 50 mg by mouth daily.      Dexcom G7 CGM ordered.   Blood Sugar Goals:       Fastin-130.       1-2 hours after a meal: Less than 180.

## 2024-04-22 PROBLEM — Z09 HOSPITAL DISCHARGE FOLLOW-UP: Status: RESOLVED | Noted: 2022-07-19 | Resolved: 2024-04-22

## 2024-04-30 ENCOUNTER — OFFICE VISIT (OUTPATIENT)
Dept: FAMILY MEDICINE | Facility: CLINIC | Age: 79
End: 2024-04-30
Payer: MEDICARE

## 2024-04-30 VITALS
OXYGEN SATURATION: 100 % | DIASTOLIC BLOOD PRESSURE: 80 MMHG | SYSTOLIC BLOOD PRESSURE: 160 MMHG | HEART RATE: 73 BPM | BODY MASS INDEX: 19.37 KG/M2 | HEIGHT: 69 IN | WEIGHT: 130.81 LBS

## 2024-04-30 DIAGNOSIS — I69.354 HEMIPLEGIA AND HEMIPARESIS FOLLOWING CEREBRAL INFARCTION AFFECTING LEFT NON-DOMINANT SIDE: ICD-10-CM

## 2024-04-30 DIAGNOSIS — N18.31 TYPE 2 DIABETES MELLITUS WITH STAGE 3A CHRONIC KIDNEY DISEASE, WITHOUT LONG-TERM CURRENT USE OF INSULIN: ICD-10-CM

## 2024-04-30 DIAGNOSIS — I73.9 PAD (PERIPHERAL ARTERY DISEASE): ICD-10-CM

## 2024-04-30 DIAGNOSIS — E11.22 TYPE 2 DIABETES MELLITUS WITH STAGE 3A CHRONIC KIDNEY DISEASE, WITHOUT LONG-TERM CURRENT USE OF INSULIN: ICD-10-CM

## 2024-04-30 DIAGNOSIS — F01.B3 MODERATE VASCULAR DEMENTIA WITH MOOD DISTURBANCE: ICD-10-CM

## 2024-04-30 DIAGNOSIS — I15.2 HYPERTENSION ASSOCIATED WITH DIABETES: Primary | ICD-10-CM

## 2024-04-30 DIAGNOSIS — E11.59 HYPERTENSION ASSOCIATED WITH DIABETES: Primary | ICD-10-CM

## 2024-04-30 DIAGNOSIS — Z79.899 ENCOUNTER FOR LONG-TERM (CURRENT) USE OF MEDICATIONS: ICD-10-CM

## 2024-04-30 PROCEDURE — 1101F PT FALLS ASSESS-DOCD LE1/YR: CPT | Mod: CPTII,S$GLB,, | Performed by: FAMILY MEDICINE

## 2024-04-30 PROCEDURE — G2211 COMPLEX E/M VISIT ADD ON: HCPCS | Mod: S$GLB,,, | Performed by: FAMILY MEDICINE

## 2024-04-30 PROCEDURE — 1126F AMNT PAIN NOTED NONE PRSNT: CPT | Mod: CPTII,S$GLB,, | Performed by: FAMILY MEDICINE

## 2024-04-30 PROCEDURE — 3079F DIAST BP 80-89 MM HG: CPT | Mod: CPTII,S$GLB,, | Performed by: FAMILY MEDICINE

## 2024-04-30 PROCEDURE — 99215 OFFICE O/P EST HI 40 MIN: CPT | Mod: S$GLB,,, | Performed by: FAMILY MEDICINE

## 2024-04-30 PROCEDURE — 3288F FALL RISK ASSESSMENT DOCD: CPT | Mod: CPTII,S$GLB,, | Performed by: FAMILY MEDICINE

## 2024-04-30 PROCEDURE — 99999 PR PBB SHADOW E&M-EST. PATIENT-LVL V: CPT | Mod: PBBFAC,,, | Performed by: FAMILY MEDICINE

## 2024-04-30 PROCEDURE — 1159F MED LIST DOCD IN RCRD: CPT | Mod: CPTII,S$GLB,, | Performed by: FAMILY MEDICINE

## 2024-04-30 PROCEDURE — 1160F RVW MEDS BY RX/DR IN RCRD: CPT | Mod: CPTII,S$GLB,, | Performed by: FAMILY MEDICINE

## 2024-04-30 PROCEDURE — 3077F SYST BP >= 140 MM HG: CPT | Mod: CPTII,S$GLB,, | Performed by: FAMILY MEDICINE

## 2024-04-30 RX ORDER — AMLODIPINE BESYLATE 5 MG/1
5 TABLET ORAL 2 TIMES DAILY
Qty: 60 TABLET | Refills: 12 | Status: SHIPPED | OUTPATIENT
Start: 2024-04-30

## 2024-04-30 RX ORDER — DIVALPROEX SODIUM 250 MG/1
250 TABLET, DELAYED RELEASE ORAL EVERY 12 HOURS
Qty: 60 TABLET | Refills: 11 | Status: SHIPPED | OUTPATIENT
Start: 2024-04-30 | End: 2025-04-30

## 2024-04-30 NOTE — ASSESSMENT & PLAN NOTE
Increase amlodipine to 5 milligrams twice daily.  Follow-up with Cardiology.  Requesting records from Cardiology.  Family reports that recent testing with Cardiology was within normal limits.  No records available at this time.  Counseled on importance of hypertension disease course, I recommend ongoing Education for DASH-diet and exercise.  Counseled on medication regimen importance of treating high blood pressure.  Please be advised of risk of untreated blood pressure as discussed.  Please advised of ER precautions were given for symptoms of hypertensive urgency and emergency.  Home health for help with vitals monitoring and medication management.

## 2024-04-30 NOTE — ASSESSMENT & PLAN NOTE
Follow-up with diabetes management.  Patient is using continuous glucose monitoring.    Previous plan:  Suspect noncompliance or poor understanding of her diabetes regimen.  I recommend that she start on Lantus daily for better glucose control.  Restart metformin and Januvia.  Avoid glipizide due to history of hypoglycemia.  Referral to Diabetes Management to help with nutrition, medication management and continuous glucose monitoring which I believe will help the patient tremendously.  Discuss reducing risk of diabetes and its complications.  Reduce hospital admissions.  We will plan to monitor hemoglobin A1c at designated intervals 3 to 6 months.  I recommend ongoing Education for diabetic diet and exercise protocol.  We will continue to monitor for side effects.    Please be advised of symptoms to monitor for and to notify me immediately if persistent or worsening.  Follow up with Ophthalmology/Optometry and Podiatry at least annually.  Home health has been ordered for skilled nursing to help with diabetes also.

## 2024-04-30 NOTE — PATIENT INSTRUCTIONS
Follow up in about 6 months (around 10/30/2024), or if symptoms worsen or fail to improve, for Annual Wellness Exam.     Dear patient,   As a result of recent federal legislation (The Federal Cures Act), you may receive lab or pathology results from your visit in your [facility] account before your physician is able to contact you. Your physician or their representative will relay the results to you with their recommendations at their soonest availability.     If no improvement in symptoms or symptoms worsen, please be advised to call MD, follow-up at clinic and/or go to ER if becomes severe.    Antoni Patel M.D.        We Offer TELEHEALTH & Same Day Appointments!   Book your Telehealth appointment with me through my nurse or   Clinic appointments on [employer]!    [address] [place] [address]    Office: [phone]   FAX: [phone]    Check out my Facebook Page and Follow Me at: [email or web address]    Check out my website at [employer] by clicking on: [email or web address]    To Schedule appointments online, go to [employer] [email or web address]

## 2024-04-30 NOTE — ASSESSMENT & PLAN NOTE
Referral back to Neurology.  Start low-dose Depakote for mood stabilization.  Monitor level.  Discussed risk and benefits of medication use.

## 2024-04-30 NOTE — PROGRESS NOTES
PLAN:      Assessment & Plan  1. Peripheral arterial disease.  The patient's home screening test, conducted by [employer], suggests a potential blockage in her left leg. An ultrasound of the legs will be ordered to further investigate the cause of the pain.    2. Dementia.  The patient will be referred to a neurologist, Dr. Staley. Home health will be ordered. A low-dose Depakote, to be taken twice daily, will be prescribed. Should the patient's symptoms worsen over the weekend, she is advised to seek immediate medical attention at the emergency room.    3. Hypertension.  The patient's blood pressure will be rechecked. If the patient's blood pressure remains elevated, the dosage of amlodipine will be increased to twice daily.    4. Diabetes.  The patient will continue to follow up with Diabetes Management.    Problem List Items Addressed This Visit       Hypertension associated with diabetes - Primary (Chronic)     Increase amlodipine to 5 milligrams twice daily.  Follow-up with Cardiology.  Requesting records from Cardiology.  Family reports that recent testing with Cardiology was within normal limits.  No records available at this time.  Counseled on importance of hypertension disease course, I recommend ongoing Education for DASH-diet and exercise.  Counseled on medication regimen importance of treating high blood pressure.  Please be advised of risk of untreated blood pressure as discussed.  Please advised of ER precautions were given for symptoms of hypertensive urgency and emergency.  Home health for help with vitals monitoring and medication management.         Relevant Medications    amLODIPine (NORVASC) 5 MG tablet    Other Relevant Orders    Ambulatory referral/consult to Diabetic Advanced Practice Providers (Medical Management)    Ambulatory referral/consult to Home Health    Type 2 diabetes mellitus (Chronic)     Follow-up with diabetes management.  Patient is using continuous glucose monitoring.    Previous  plan:  Suspect noncompliance or poor understanding of her diabetes regimen.  I recommend that she start on Lantus daily for better glucose control.  Restart metformin and Januvia.  Avoid glipizide due to history of hypoglycemia.  Referral to Diabetes Management to help with nutrition, medication management and continuous glucose monitoring which I believe will help the patient tremendously.  Discuss reducing risk of diabetes and its complications.  Reduce hospital admissions.  We will plan to monitor hemoglobin A1c at designated intervals 3 to 6 months.  I recommend ongoing Education for diabetic diet and exercise protocol.  We will continue to monitor for side effects.    Please be advised of symptoms to monitor for and to notify me immediately if persistent or worsening.  Follow up with Ophthalmology/Optometry and Podiatry at least annually.  Home health has been ordered for skilled nursing to help with diabetes also.         Relevant Orders    Ambulatory referral/consult to Diabetic Advanced Practice Providers (Medical Management)    Encounter for long-term (current) use of medications (Chronic)     Complete history and physical was completed today.  Complete and thorough medication reconciliation was performed.  Discussed risks and benefits of medications.  Advised patient on orders and health maintenance.  We discussed old records and old labs if available.  Will request any records not available through epic.  Continue current medications listed on your summary sheet.           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  Start low-dose Depakote for mood stabilization.  Monitor level.  Discussed risk and benefits of medication use.         Relevant Medications    divalproex (DEPAKOTE) 250 MG EC tablet    Other Relevant Orders    Ambulatory referral/consult to Neurology    VALPROIC ACID    Ambulatory referral/consult to Home Health    PAD (peripheral artery  disease)     Check arterial ultrasound with [facility].  Follow-up after for results.         Relevant Orders    US Lower Extrem Arteries Bilat with [facility] (xpd)    Hemiplegia and hemiparesis following cerebral infarction affecting left non-dominant side     Referral to home health for further evaluation and treatment with PT and OT.         Relevant Orders    Ambulatory referral/consult to Home Health     Future Appointments       Date Provider Specialty Appt Notes    5/6/2024 Kaylah Bess NP Neurology Moderate vascular dementia with mood disturbance [F01.B3]    5/14/2024  Family Medicine 2 wk bp check-    5/20/2024  Lab [facility]    5/20/2024  Radiology .    6/13/2024 Jo Ann Haynes FNP Diabetes 2 month f/u-dexcom    7/30/2024  Lab labs    1/27/2025 Mary King NP Urology annual           Medication Management for assessment above:   Medication List with Changes/Refills   New Medications    DIVALPROEX (DEPAKOTE) 250 MG EC TABLET    Take 1 tablet (250 mg total) by mouth every 12 (twelve) hours.   Current Medications    ACCU-CHEK AMELIA PLUS METER MISC    Use 1 device  three times a day    ACCU-CHEK AMELIA PLUS TEST STRP STRP    Inject 1 strip into the skin 3 (three) times daily.    ASPIRIN (ECOTRIN) 81 MG EC TABLET    Take 1 tablet (81 mg total) by mouth once. for 1 dose    ATORVASTATIN (LIPITOR) 80 MG TABLET    Take 1 tablet (80 mg total) by mouth once daily.    BLOOD-GLUCOSE METER,CONTINUOUS (DEXCOM G7 ) MISC    1 Device by Misc.(Non-Drug; Combo Route) route once daily.    BLOOD-GLUCOSE SENSOR (DEXCOM G7 SENSOR) PURNIMA    1 Device by Misc.(Non-Drug; Combo Route) route every 10 days.    CARVEDILOL (COREG) 3.125 MG TABLET    TAKE 1 TABLET BY MOUTH EVERY MORNING and ONE IN THE EVENING WITH A MEAL    CHOLECALCIFEROL, VITAMIN D3, (VITAMIN D3) 25 MCG (1,000 UNIT) CAPSULE    Take 1 capsule (1,000 Units total) by mouth once daily.    DICLOFENAC SODIUM (VOLTAREN) 1 % GEL    Apply topically.    ELIQUIS 5  MG TAB    TAKE TWO TABLETS BY MOUTH TWICE DAILY FOR 13 DAYS, then TAKE 1 TABLET BY MOUTH TWICE DAILY FOR 17 days    GEMTESA 75 MG TAB    Take 1 tablet by mouth once daily.    INSULIN (LANTUS SOLOSTAR U-100 INSULIN) GLARGINE 100 UNITS/ML SUBQ PEN    Inject 5 Units into the skin every evening.    IRBESARTAN-HYDROCHLOROTHIAZIDE (AVALIDE) 300-12.5 MG PER TABLET    irbesartan 300 mg-hydrochlorothiazide 12.5 mg tablet   TAKE 1 TABLET BY MOUTH EVERY DAY    ISOSORBIDE MONONITRATE (IMDUR) 30 MG 24 HR TABLET    TAKE 1 TABLET BY MOUTH ONCE DAILY    JANUVIA 50 MG TAB    Take 1 tablet (50 mg total) by mouth once daily.    METFORMIN (GLUCOPHAGE) 500 MG TABLET    Take 1 tablet (500 mg total) by mouth 2 (two) times daily with meals.    PANTOPRAZOLE (PROTONIX) 40 MG TABLET    Take 40 mg by mouth every morning.   Changed and/or Refilled Medications    Modified Medication Previous Medication    AMLODIPINE (NORVASC) 5 MG TABLET amLODIPine (NORVASC) 5 MG tablet       Take 1 tablet (5 mg total) by mouth 2 (two) times daily.    Take 5 mg by mouth 2 (two) times daily.   Discontinued Medications    ESTRADIOL (ESTRACE) 0.01 % (0.1 MG/GRAM) VAGINAL CREAM    Place 1 g vaginally 3 (three) times a week.       Antoni Patel M.D.  ==========================================================================  Subjective:   Patient ID: Marianne Cali is a 78 y.o. female.  has a past medical history of Allergy, Cataract, Dementia, Depression, Diabetes mellitus, type 2, Heart murmur, Hypertension, and Stroke.   Chief Complaint: Follow-up      History of Present Illness  The patient presents for follow-up for abnormal screening test results for peripheral arterial disease done by her insurance at home. She had an abnormal screening test on the left with 0.047 and a normal on the right 1.39 by volume plus monography VPs. She is accompanied by her daughter.    The patient reports experiencing leg pain.    The patient's daughter reports that the patient  occasionally exhibits agitation due to her dementia. The patient is seeking a prescription for her mood stabilization. She denies experiencing anxiety or depression, but does experience hallucinations. She is not currently receiving home health care. The patient's sleep quality is disrupted, with periods of 2 to 3 weeks of agitation occurring. The patient has been diagnosed with dementia.    The patient's cardiologist, Dr. MILES, evaluated her last week and performed an ultrasound of her heart, which yielded normal results. The patient is currently on amlodipine 5 mg, carvedilol 3.125 mg, irbesartan, hydrochlorothiazide, and isosorbide mononitrate for blood pressure management. Her daughter monitors her blood pressure at home using a cuff. The patient's blood pressure is not typically low, but it elevates. The patient's hydration is insufficient.    The patient is currently on metformin and Januvia for her diabetes. She does not monitor her blood glucose levels at home. She has a continuous glucose monitor on her arm, but does not perform finger pricks. She is under the care of [facility] for her diabetes management. The patient is currently using a stick for mobility.    Supplemental Information  She had a stroke about 4 years ago. She is still weak on one side from the stroke. She is still taking her blood thinner twice a day. She has an appointment next month to get her eyes checked. She has not had the shingles vaccine.    Problem List Items Addressed This Visit       Hypertension associated with diabetes - Primary (Chronic)    Overview     April 2024:  Blood pressure uncontrolled today.  Patient reports intermittent compliance with blood pressure medication.  Here with her daughter.  She has not currently receiving home health.  History of stroke.  Hypertension Medications               carvediloL (COREG) 3.125 MG tablet TAKE 1 TABLET BY MOUTH EVERY MORNING and ONE IN THE EVENING WITH A MEAL     irbesartan-hydrochlorothiazide (AVALIDE) 300-12.5 mg per tablet irbesartan 300 mg-hydrochlorothiazide 12.5 mg tablet   TAKE 1 TABLET BY MOUTH EVERY DAY    isosorbide mononitrate (IMDUR) 30 MG 24 hr tablet TAKE 1 TABLET BY MOUTH ONCE DAILY    amLODIPine (NORVASC) 5 MG tablet Take 1 tablet (5 mg total) by mouth 2 (two) times daily.              Previous history:  CHRONIC. STABLE. BP Reviewed. BP at goal today. Compliant with BP medications. No SE reported.    (-) CP, SOB, palpitations, dizziness, lightheadedness, HA, arm numbness, tingling or weakness, syncope.  Creatinine   Date Value Ref Range Status   04/11/2024 1.3 0.5 - 1.4 mg/dL Final   Previous  Hypertension Medications               amLODIPine (NORVASC) 5 MG tablet Take 5 mg by mouth.    carvediloL (COREG) 3.125 MG tablet TAKE 1 TABLET BY MOUTH EVERY MORNING and ONE IN THE EVENING WITH A MEAL    irbesartan-hydrochlorothiazide (AVALIDE) 300-12.5 mg per tablet irbesartan 300 mg-hydrochlorothiazide 12.5 mg tablet   TAKE 1 TABLET BY MOUTH EVERY DAY    isosorbide mononitrate (IMDUR) 30 MG 24 hr tablet TAKE 1 TABLET BY MOUTH ONCE DAILY       Results for orders placed or performed during the hospital encounter of 09/18/22   EKG 12-lead    Collection Time: 09/18/22  8:48 AM    Narrative    Test Reason : R41.82,    Vent. Rate : 087 BPM     Atrial Rate : 087 BPM     P-R Int : 168 ms          QRS Dur : 072 ms      QT Int : 358 ms       P-R-T Axes : 039 -16 075 degrees     QTc Int : 430 ms    Sinus rhythm with Premature atrial complexes  Minimal voltage criteria for LVH, may be normal variant ( R in aVL )  Nonspecific T wave abnormality  Abnormal ECG  No previous ECGs available  Confirmed by Ronal GONZALES, Marianna ([id number]) on 9/18/2022 10:17:40 AM    Referred By: ARMANDO   SELF           Confirmed By:Marianna Villeda MD              Current Assessment & Plan     Increase amlodipine to 5 milligrams twice daily.  Follow-up with Cardiology.  Requesting records from Cardiology.  Family  reports that recent testing with Cardiology was within normal limits.  No records available at this time.  Counseled on importance of hypertension disease course, I recommend ongoing Education for DASH-diet and exercise.  Counseled on medication regimen importance of treating high blood pressure.  Please be advised of risk of untreated blood pressure as discussed.  Please advised of ER precautions were given for symptoms of hypertensive urgency and emergency.  Home health for help with vitals monitoring and medication management.         Type 2 diabetes mellitus (Chronic)    Overview       April 2024: Patient following closely with diabetes management.  Diabetes Medications               insulin (LANTUS SOLOSTAR U-100 INSULIN) glargine 100 units/mL SubQ pen Inject 5 Units into the skin every evening.    JANUVIA 50 mg Tab Take 1 tablet (50 mg total) by mouth once daily.    metFORMIN (GLUCOPHAGE) 500 MG tablet Take 1 tablet (500 mg total) by mouth 2 (two) times daily with meals.            January 2024:  Patient here with her son and .  They are very concerned about her diabetes medications.  Patient reports that she has not currently taking any medications for diabetes at home.  She was previously on Ozempic but hospitalization recently told her to stop taking the medication.  Recent A1c was 11 or higher.  Blood sugar today in clinic is 247.    Diabetes Medications               insulin (LANTUS SOLOSTAR U-100 INSULIN) glargine 100 units/mL SubQ pen Inject 5 Units into the skin every evening.    JANUVIA 50 mg Tab Take 1 tablet (50 mg total) by mouth once daily.    metFORMIN (GLUCOPHAGE) 1000 MG tablet Take 1 tablet (1,000 mg total) by mouth 2 (two) times daily with meals.              August 2023: Chronic. Stable. She has missed a few doses of Ozempic. Reports no longer taking Metformin. Due for labs.   July 2022:  Patient with recent hospitalization.  Patient reports that she is taking the Ozempic 0.25  milligrams.  She is having some nausea but otherwise has been better controlled.  Patient is due for A1c.April 2022:  Patient with uncontrolled A1c from last labs performed [place].  Currently on metformin 1000 milligrams twice daily, Januvia 100 milligrams, glipizide 10 milligrams twice daily.  Noncompliance with diet.  Denies history of pancreatitis, thyroid cancer, MEN syndrome.  Diabetes Management Status    Statin: Taking  ACE/ARB: Taking    Screening or Prevention Patient's value Goal Complete/Controlled?   HgA1C Testing and Control   Lab Results   Component Value Date    HGBA1C 8.5 (H) 04/11/2024      Annually/Less than 8% No   Lipid profile : 01/19/2024 Annually Yes   LDL control Lab Results   Component Value Date    LDLCALC 99.0 01/19/2024    Annually/Less than 100 mg/dl  Yes   Nephropathy screening Lab Results   Component Value Date    LABMICR 57.0 04/11/2024     Lab Results   Component Value Date    PROTEINUA Trace (A) 01/19/2024     Lab Results   Component Value Date    UTPCR 0.12 04/26/2023      Annually Yes   Blood pressure BP Readings from Last 1 Encounters:   04/30/24 (!) 160/80    Less than 140/90 No   Dilated retinal exam Most Recent Eye Exam Date: Not Found Annually No   Foot exam   Most Recent Foot Exam Date: Not Found Annually No              Current Assessment & Plan     Follow-up with diabetes management.  Patient is using continuous glucose monitoring.    Previous plan:  Suspect noncompliance or poor understanding of her diabetes regimen.  I recommend that she start on Lantus daily for better glucose control.  Restart metformin and Januvia.  Avoid glipizide due to history of hypoglycemia.  Referral to Diabetes Management to help with nutrition, medication management and continuous glucose monitoring which I believe will help the patient tremendously.  Discuss reducing risk of diabetes and its complications.  Reduce hospital admissions.  We will plan to monitor hemoglobin A1c at designated  intervals 3 to 6 months.  I recommend ongoing Education for diabetic diet and exercise protocol.  We will continue to monitor for side effects.    Please be advised of symptoms to monitor for and to notify me immediately if persistent or worsening.  Follow up with Ophthalmology/Optometry and Podiatry at least annually.  Home health has been ordered for skilled nursing to help with diabetes also.         Encounter for long-term (current) use of medications (Chronic)    Overview     April 2024: Reviewed labs.  CHRONIC. Stable. Compliant with medications for managed conditions. See medication list. No SE reported.   Routine lab analysis is being monitored. Refills were addressed.  Lab Results   Component Value Date    WBC 7.05 01/19/2024    HGB 10.7 (L) 01/19/2024    HCT 34.0 (L) 01/19/2024    MCV 88 01/19/2024     01/19/2024         Chemistry        Component Value Date/Time     04/11/2024 1458    K 4.9 04/11/2024 1458     04/11/2024 1458    CO2 22 (L) 04/11/2024 1458    BUN 33 (H) 04/11/2024 1458    CREATININE 1.3 04/11/2024 1458     (H) 04/11/2024 1458        Component Value Date/Time    CALCIUM 9.8 04/11/2024 1458    ALKPHOS 67 02/02/2024 1226    ALKPHOS 75 01/19/2024 1140    AST 22 02/02/2024 1226    AST 21 01/19/2024 1140    ALT 13 02/02/2024 1226    ALT 14 01/19/2024 1140    BILITOT 0.4 02/02/2024 1226    BILITOT 0.6 01/19/2024 1140    ESTGFRAFRICA 51 (L) 10/06/2022 1408    ESTGFRAFRICA 39.0 (A) 07/22/2022 0917    ESTGFRAFRICA 39.0 (A) 07/22/2022 0917    EGFRNONAA 33.8 (A) 07/22/2022 0917    EGFRNONAA 33.8 (A) 07/22/2022 0917        Lab Results   Component Value Date    TSH 1.273 01/19/2024            Current Assessment & Plan     Complete history and physical was completed today.  Complete and thorough medication reconciliation was performed.  Discussed risks and benefits of medications.  Advised patient on orders and health maintenance.  We discussed old records and old labs if  available.  Will request any records not available through epic.  Continue current medications listed on your summary sheet.           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  Family reporting that she is having some mood disturbance with possible hallucinations and irritation.    Chronic.  Control is uncertain.  Patient was previously taking donepezil.  She has seen Neurology in the past.  Donepezil is no longer on the medication list at this time.         Current Assessment & Plan     Referral back to Neurology.  Start low-dose Depakote for mood stabilization.  Monitor level.  Discussed risk and benefits of medication use.         PAD (peripheral artery disease)    Overview     Patient with positive screening for peripheral arterial disease through home screening with her insurance.  Patient reports having some leg pain         Current Assessment & Plan     Check arterial ultrasound with [facility].  Follow-up after for results.         Hemiplegia and hemiparesis following cerebral infarction affecting left non-dominant side    Overview     Patient with chronic left-sided weakness.         Current Assessment & Plan     Referral to home health for further evaluation and treatment with PT and OT.             Review of patient's allergies indicates:   Allergen Reactions    Morphine Anxiety and Other (See Comments)     Dizziness     Current Outpatient Medications   Medication Instructions    ACCU-CHEK AMELIA PLUS METER Misc Use 1 device  three times a day    ACCU-CHEK AMELIA PLUS TEST STRP Strp 1 strip, Subcutaneous, 3 times daily    amLODIPine (NORVASC) 5 mg, Oral, 2 times daily    aspirin (ECOTRIN) 81 mg, Oral, Once    atorvastatin (LIPITOR) 80 mg, Oral, Daily    blood-glucose meter,continuous (DEXCOM G7 ) Misc 1 Device, Misc.(Non-Drug; Combo Route), Daily    blood-glucose sensor (DEXCOM G7 SENSOR) Philomena 1  Device, Misc.(Non-Drug; Combo Route), Every 10 days    carvediloL (COREG) 3.125 MG tablet TAKE 1 TABLET BY MOUTH EVERY MORNING and ONE IN THE EVENING WITH A MEAL    cholecalciferol (vitamin D3) (VITAMIN D3) 1,000 Units, Oral, Daily    diclofenac sodium (VOLTAREN) 1 % Gel Topical (Top)    divalproex (DEPAKOTE) 250 mg, Oral, Every 12 hours    ELIQUIS 5 mg Tab TAKE TWO TABLETS BY MOUTH TWICE DAILY FOR 13 DAYS, then TAKE 1 TABLET BY MOUTH TWICE DAILY FOR 17 days    GEMTESA 75 mg Tab 1 tablet, Oral, Daily    irbesartan-hydrochlorothiazide (AVALIDE) 300-12.5 mg per tablet irbesartan 300 mg-hydrochlorothiazide 12.5 mg tablet   TAKE 1 TABLET BY MOUTH EVERY DAY    isosorbide mononitrate (IMDUR) 30 mg, Oral    JANUVIA 50 mg, Oral, Daily    LANTUS SOLOSTAR U-100 INSULIN 5 Units, Subcutaneous, Nightly    metFORMIN (GLUCOPHAGE) 500 mg, Oral, 2 times daily with meals    pantoprazole (PROTONIX) 40 mg, Oral, Every morning      I have reviewed the PMH, social history, FamilyHx, surgical history, allergies and medications documented / confirmed by the patient at the time of this visit.  Review of Systems   Constitutional:  Positive for fatigue. Negative for chills, fever and unexpected weight change.   HENT:  Negative for ear pain and sore throat.    Eyes:  Negative for redness and visual disturbance.   Respiratory:  Negative for cough and shortness of breath.    Cardiovascular:  Negative for chest pain and palpitations.   Gastrointestinal:  Negative for abdominal distention, abdominal pain, blood in stool, constipation, diarrhea, nausea and vomiting.   Genitourinary:  Negative for difficulty urinating and hematuria.   Musculoskeletal:  Positive for arthralgias and gait problem. Negative for myalgias.   Skin:  Negative for rash and wound.   Neurological:  Positive for weakness. Negative for headaches.   Psychiatric/Behavioral:  Positive for behavioral problems, confusion, decreased concentration and hallucinations. Negative for sleep  "disturbance. The patient is not nervous/anxious.      Objective:   BP (!) 160/80   Pulse 73   Ht 5' 9" (1.753 m)   Wt 59.3 kg (130 lb 12.8 oz)   SpO2 100%   BMI 19.32 kg/m²   Physical Exam  Vitals and nursing note reviewed.   Constitutional:       General: She is not in acute distress.     Appearance: She is well-developed. She is not ill-appearing, toxic-appearing or diaphoretic.      Comments: Here with Daughter    URBAN:      Head: Normocephalic and atraumatic.      Right Ear: Hearing and external ear normal.      Left Ear: Hearing and external ear normal.      Nose: Nose normal. No rhinorrhea.   Eyes:      General: Lids are normal.      Extraocular Movements: Extraocular movements intact.      Conjunctiva/sclera: Conjunctivae normal.      Pupils: Pupils are equal, round, and reactive to light.   Cardiovascular:      Rate and Rhythm: Normal rate.      Pulses: Normal pulses.   Pulmonary:      Effort: Pulmonary effort is normal. No respiratory distress.      Breath sounds: Normal breath sounds.   Abdominal:      General: Bowel sounds are normal.      Palpations: Abdomen is soft.   Musculoskeletal:         General: Normal range of motion.      Cervical back: Normal range of motion and neck supple.   Skin:     General: Skin is warm and dry.      Capillary Refill: Capillary refill takes less than 2 seconds.      Coloration: Skin is not pale.   Neurological:      General: No focal deficit present.      Mental Status: She is alert and oriented to person, place, and time. Mental status is at baseline. She is not disoriented.      Cranial Nerves: No cranial nerve deficit.      Motor: Weakness (chronic generalized, slight L>R) present.      Gait: Gait abnormal (In wheelchair today).   Psychiatric:         Attention and Perception: She is attentive. She does not perceive auditory or visual hallucinations.         Mood and Affect: Mood normal. Mood is not anxious or depressed.         Speech: Speech is not rapid and " pressured or slurred.         Behavior: Behavior normal. Behavior is not agitated, aggressive or hyperactive. Behavior is cooperative.         Thought Content: Thought content normal. Thought content is not paranoid or delusional. Thought content does not include homicidal or suicidal ideation. Thought content does not include homicidal or suicidal plan.         Cognition and Memory: Memory is impaired.         Judgment: Judgment normal.       Physical Exam  Lungs are clear.  Heart sounds are normal.    Vital Signs  Blood pressure reading is 166/86.    Results  Laboratory Studies  Abnormal screening test on the left with 0.047 and a normal on the right, 1.39 by volume plus monography VPs. A1c is 8.5.    Assessment:     1. Hypertension associated with diabetes    2. Encounter for long-term (current) use of medications    3. Moderate vascular dementia with mood disturbance    4. PAD (peripheral artery disease)    5. Hemiplegia and hemiparesis following cerebral infarction affecting left non-dominant side    6. Type 2 diabetes mellitus with stage 3a chronic kidney disease, without long-term current use of insulin      MDM:   High medical complexity.  Moderate to high risk.  Total time: 45 minutes.  This includes total time spent on the encounter, which includes face to face time and non-face to face time preparing to see the patient (eg, review of previous medical records, tests), Obtaining and/or reviewing separately obtained history, documenting clinical information in the electronic or other health record, independently interpreting results (not separately reported)/communicating results to the patient/family/caregiver, and/or care coordination (not separately reported).    I have Reviewed and summarized old records.  I have performed thorough medication reconciliation today and discussed risk and benefits of medications.  I have reviewed labs and discussed with patient.  All questions were answered.  I am  requesting old records and will review them once they are available.  Visit today included increased complexity associated with the care of the episodic problem see above assessment addressed and managing the longitudinal care of the patient due to the serious and/or complex managed problem(s) see above.  I have signed for the following orders AND/OR meds.  Orders Placed This Encounter   Procedures    US Lower Extrem Arteries Bilat with ERICA (xpd)     Standing Status:   Future     Standing Expiration Date:   4/30/2025     Order Specific Question:   May the Radiologist modify the order per protocol to meet the clinical needs of the patient?     Answer:   Yes    VALPROIC ACID     Standing Status:   Future     Standing Expiration Date:   7/29/2025    Ambulatory referral/consult to Neurology     Standing Status:   Future     Standing Expiration Date:   5/30/2025     Referral Priority:   Urgent     Referral Type:   Consultation     Referral Reason:   Specialty Services Required     Requested Specialty:   Neurology     Number of Visits Requested:   1    Ambulatory referral/consult to Diabetic Advanced Practice Providers (Medical Management)     Standing Status:   Future     Standing Expiration Date:   5/30/2025     Referral Priority:   Routine     Referral Type:   Consultation     Referral Reason:   Specialty Services Required     Referred to Provider:   Jo Ann Haynes FNP     Number of Visits Requested:   1    Ambulatory referral/consult to Home Health     Standing Status:   Future     Standing Expiration Date:   5/30/2025     Referral Priority:   Routine     Referral Type:   Home Health     Referral Reason:   Specialty Services Required     Referred to Provider:   [facility] -     Requested Specialty:   Home Health Services     Number of Visits Requested:   1     Medications Ordered This Encounter   Medications    amLODIPine (NORVASC) 5 MG tablet     Sig: Take 1 tablet (5 mg total) by mouth 2  (two) times daily.     Dispense:  60 tablet     Refill:  12     .    divalproex (DEPAKOTE) 250 MG EC tablet     Sig: Take 1 tablet (250 mg total) by mouth every 12 (twelve) hours.     Dispense:  60 tablet     Refill:  11        Follow up in about 6 months (around 10/30/2024), or if symptoms worsen or fail to improve, for Annual Wellness Exam.  Future Appointments       Date Provider Specialty Appt Notes    5/6/2024 Kaylah Bess NP Neurology Moderate vascular dementia with mood disturbance [F01.B3]    5/14/2024  Family Medicine 2 wk bp check-    5/20/2024  Lab [facility]    5/20/2024  Radiology .    6/13/2024 Jo Ann Haynes FNP Diabetes 2 month f/u-[facility]    7/30/2024  Lab labs    1/27/2025 Mary King NP Urology annual          If no improvement in symptoms or symptoms worsen, advised to call/follow-up at clinic or go to ER. Patient voiced understanding and all questions/concerns were addressed.   DISCLAIMER: This note was compiled by using a speech recognition dictation system and therefore please be aware that typographical / speech recognition errors can and do occur.  Please contact me if you see any errors specifically.  Consent was obtained for [employer] recording system prior to the visit.    Antoni Patel M.D.       Office: [phone] [address] [place] [address]  FAX: [phone]

## 2024-05-03 ENCOUNTER — EXTERNAL HOME HEALTH (OUTPATIENT)
Dept: HOME HEALTH SERVICES | Facility: HOSPITAL | Age: 79
End: 2024-05-03
Payer: MEDICARE

## 2024-05-05 PROCEDURE — G0180 MD CERTIFICATION HHA PATIENT: HCPCS | Mod: ,,, | Performed by: FAMILY MEDICINE

## 2024-05-06 ENCOUNTER — TELEPHONE (OUTPATIENT)
Dept: FAMILY MEDICINE | Facility: CLINIC | Age: 79
End: 2024-05-06
Payer: MEDICARE

## 2024-05-06 ENCOUNTER — LAB VISIT (OUTPATIENT)
Dept: LAB | Facility: HOSPITAL | Age: 79
End: 2024-05-06
Attending: NURSE PRACTITIONER
Payer: MEDICARE

## 2024-05-06 ENCOUNTER — OFFICE VISIT (OUTPATIENT)
Dept: NEUROLOGY | Facility: CLINIC | Age: 79
End: 2024-05-06
Payer: MEDICARE

## 2024-05-06 ENCOUNTER — TELEPHONE (OUTPATIENT)
Dept: NEUROLOGY | Facility: CLINIC | Age: 79
End: 2024-05-06

## 2024-05-06 VITALS
BODY MASS INDEX: 19.43 KG/M2 | HEIGHT: 69 IN | HEART RATE: 72 BPM | RESPIRATION RATE: 16 BRPM | WEIGHT: 131.19 LBS | DIASTOLIC BLOOD PRESSURE: 82 MMHG | SYSTOLIC BLOOD PRESSURE: 169 MMHG

## 2024-05-06 DIAGNOSIS — I25.118 CORONARY ARTERY DISEASE INVOLVING NATIVE CORONARY ARTERY OF NATIVE HEART WITH OTHER FORM OF ANGINA PECTORIS: Chronic | ICD-10-CM

## 2024-05-06 DIAGNOSIS — I25.2 HISTORY OF NON-ST ELEVATION MYOCARDIAL INFARCTION (NSTEMI): ICD-10-CM

## 2024-05-06 DIAGNOSIS — Z86.73 HISTORY OF CVA (CEREBROVASCULAR ACCIDENT): ICD-10-CM

## 2024-05-06 DIAGNOSIS — I67.89 ISCHEMIC ENCEPHALOPATHY: ICD-10-CM

## 2024-05-06 DIAGNOSIS — F02.B18 MODERATE MAJOR NEUROCOGNITIVE DISORDER DUE TO ALZHEIMER'S DISEASE WITH BEHAVIORAL DISTURBANCE: ICD-10-CM

## 2024-05-06 DIAGNOSIS — R29.818 OTHER SYMPTOMS AND SIGNS INVOLVING THE NERVOUS SYSTEM: ICD-10-CM

## 2024-05-06 DIAGNOSIS — E83.39 HYPOPHOSPHATEMIA: ICD-10-CM

## 2024-05-06 DIAGNOSIS — G30.9 MODERATE MAJOR NEUROCOGNITIVE DISORDER DUE TO ALZHEIMER'S DISEASE WITH BEHAVIORAL DISTURBANCE: Primary | ICD-10-CM

## 2024-05-06 DIAGNOSIS — Z87.09 HISTORY OF ASTHMA: ICD-10-CM

## 2024-05-06 DIAGNOSIS — E11.22 TYPE 2 DIABETES MELLITUS WITH STAGE 3A CHRONIC KIDNEY DISEASE, WITHOUT LONG-TERM CURRENT USE OF INSULIN: Chronic | ICD-10-CM

## 2024-05-06 DIAGNOSIS — R41.82 ALTERED MENTAL STATUS, UNSPECIFIED ALTERED MENTAL STATUS TYPE: ICD-10-CM

## 2024-05-06 DIAGNOSIS — I73.9 PAD (PERIPHERAL ARTERY DISEASE): ICD-10-CM

## 2024-05-06 DIAGNOSIS — R29.6 FREQUENT FALLS: ICD-10-CM

## 2024-05-06 DIAGNOSIS — E78.2 MIXED HYPERLIPIDEMIA: ICD-10-CM

## 2024-05-06 DIAGNOSIS — F02.818 MAJOR NEUROCOGNITIVE DISORDER DUE TO ALZHEIMER'S DISEASE, WITH BEHAVIORAL DISTURBANCE: ICD-10-CM

## 2024-05-06 DIAGNOSIS — N18.31 TYPE 2 DIABETES MELLITUS WITH STAGE 3A CHRONIC KIDNEY DISEASE, WITHOUT LONG-TERM CURRENT USE OF INSULIN: Primary | ICD-10-CM

## 2024-05-06 DIAGNOSIS — I15.2 HYPERTENSION ASSOCIATED WITH DIABETES: Chronic | ICD-10-CM

## 2024-05-06 DIAGNOSIS — N18.32 STAGE 3B CHRONIC KIDNEY DISEASE: ICD-10-CM

## 2024-05-06 DIAGNOSIS — I69.354 HEMIPLEGIA AND HEMIPARESIS FOLLOWING CEREBRAL INFARCTION AFFECTING LEFT NON-DOMINANT SIDE: ICD-10-CM

## 2024-05-06 DIAGNOSIS — E11.59 HYPERTENSION ASSOCIATED WITH DIABETES: Chronic | ICD-10-CM

## 2024-05-06 DIAGNOSIS — R41.3 OTHER AMNESIA: ICD-10-CM

## 2024-05-06 DIAGNOSIS — G93.41 METABOLIC ENCEPHALOPATHY: ICD-10-CM

## 2024-05-06 DIAGNOSIS — G30.9 MAJOR NEUROCOGNITIVE DISORDER DUE TO ALZHEIMER'S DISEASE, WITH BEHAVIORAL DISTURBANCE: ICD-10-CM

## 2024-05-06 DIAGNOSIS — I50.32 CHRONIC DIASTOLIC CONGESTIVE HEART FAILURE: ICD-10-CM

## 2024-05-06 DIAGNOSIS — I50.9 ACUTE ON CHRONIC CONGESTIVE HEART FAILURE, UNSPECIFIED HEART FAILURE TYPE: ICD-10-CM

## 2024-05-06 DIAGNOSIS — F02.B18 MODERATE MAJOR NEUROCOGNITIVE DISORDER DUE TO ALZHEIMER'S DISEASE WITH BEHAVIORAL DISTURBANCE: Primary | ICD-10-CM

## 2024-05-06 DIAGNOSIS — F03.90 DEMENTIA WITHOUT BEHAVIORAL DISTURBANCE: ICD-10-CM

## 2024-05-06 DIAGNOSIS — Z91.148 NONCOMPLIANCE WITH MEDICATION REGIMEN: ICD-10-CM

## 2024-05-06 DIAGNOSIS — E11.69 HYPERLIPIDEMIA ASSOCIATED WITH TYPE 2 DIABETES MELLITUS: Chronic | ICD-10-CM

## 2024-05-06 DIAGNOSIS — Z95.0 CARDIAC PACEMAKER IN SITU: ICD-10-CM

## 2024-05-06 DIAGNOSIS — E78.5 HYPERLIPIDEMIA ASSOCIATED WITH TYPE 2 DIABETES MELLITUS: Chronic | ICD-10-CM

## 2024-05-06 DIAGNOSIS — I63.89 OTHER CEREBRAL INFARCTION: ICD-10-CM

## 2024-05-06 DIAGNOSIS — Z95.5 HISTORY OF CORONARY ANGIOPLASTY WITH INSERTION OF STENT: ICD-10-CM

## 2024-05-06 DIAGNOSIS — N18.31 TYPE 2 DIABETES MELLITUS WITH STAGE 3A CHRONIC KIDNEY DISEASE, WITHOUT LONG-TERM CURRENT USE OF INSULIN: Chronic | ICD-10-CM

## 2024-05-06 DIAGNOSIS — R76.8 ANA POSITIVE: ICD-10-CM

## 2024-05-06 DIAGNOSIS — I25.2 OLD MYOCARDIAL INFARCTION: ICD-10-CM

## 2024-05-06 DIAGNOSIS — R00.2 PALPITATIONS: ICD-10-CM

## 2024-05-06 DIAGNOSIS — R53.1 GENERALIZED WEAKNESS: ICD-10-CM

## 2024-05-06 DIAGNOSIS — E11.22 TYPE 2 DIABETES MELLITUS WITH STAGE 3A CHRONIC KIDNEY DISEASE, WITHOUT LONG-TERM CURRENT USE OF INSULIN: Primary | ICD-10-CM

## 2024-05-06 DIAGNOSIS — G30.9 MODERATE MAJOR NEUROCOGNITIVE DISORDER DUE TO ALZHEIMER'S DISEASE WITH BEHAVIORAL DISTURBANCE: ICD-10-CM

## 2024-05-06 LAB
ALBUMIN SERPL BCP-MCNC: 3.5 G/DL (ref 3.5–5.2)
ALP SERPL-CCNC: 54 U/L (ref 55–135)
ALT SERPL W/O P-5'-P-CCNC: 6 U/L (ref 10–44)
ANION GAP SERPL CALC-SCNC: 15 MMOL/L (ref 8–16)
AST SERPL-CCNC: 13 U/L (ref 10–40)
BILIRUB SERPL-MCNC: 0.4 MG/DL (ref 0.1–1)
BUN SERPL-MCNC: 27 MG/DL (ref 8–23)
CALCIUM SERPL-MCNC: 10 MG/DL (ref 8.7–10.5)
CHLORIDE SERPL-SCNC: 106 MMOL/L (ref 95–110)
CO2 SERPL-SCNC: 21 MMOL/L (ref 23–29)
CREAT SERPL-MCNC: 1 MG/DL (ref 0.5–1.4)
EST. GFR  (NO RACE VARIABLE): 58 ML/MIN/1.73 M^2
FOLATE SERPL-MCNC: 7.8 NG/ML (ref 4–24)
GLUCOSE SERPL-MCNC: 82 MG/DL (ref 70–110)
HCYS SERPL-SCNC: 19.6 UMOL/L (ref 4–15.5)
HIV 1+2 AB+HIV1 P24 AG SERPL QL IA: NORMAL
POTASSIUM SERPL-SCNC: 4.1 MMOL/L (ref 3.5–5.1)
PROT SERPL-MCNC: 8.5 G/DL (ref 6–8.4)
SODIUM SERPL-SCNC: 142 MMOL/L (ref 136–145)
TREPONEMA PALLIDUM IGG+IGM AB [PRESENCE] IN SERUM OR PLASMA BY IMMUNOASSAY: NONREACTIVE
TSH SERPL DL<=0.005 MIU/L-ACNC: 1.16 UIU/ML (ref 0.4–4)
VIT B12 SERPL-MCNC: 758 PG/ML (ref 210–950)

## 2024-05-06 PROCEDURE — 86225 DNA ANTIBODY NATIVE: CPT | Mod: 59 | Performed by: NURSE PRACTITIONER

## 2024-05-06 PROCEDURE — 82607 VITAMIN B-12: CPT | Performed by: NURSE PRACTITIONER

## 2024-05-06 PROCEDURE — 99417 PROLNG OP E/M EACH 15 MIN: CPT | Mod: S$GLB,,, | Performed by: NURSE PRACTITIONER

## 2024-05-06 PROCEDURE — 3288F FALL RISK ASSESSMENT DOCD: CPT | Mod: CPTII,S$GLB,, | Performed by: NURSE PRACTITIONER

## 2024-05-06 PROCEDURE — 83090 ASSAY OF HOMOCYSTEINE: CPT | Performed by: NURSE PRACTITIONER

## 2024-05-06 PROCEDURE — 87389 HIV-1 AG W/HIV-1&-2 AB AG IA: CPT | Performed by: NURSE PRACTITIONER

## 2024-05-06 PROCEDURE — 86039 ANTINUCLEAR ANTIBODIES (ANA): CPT | Performed by: NURSE PRACTITIONER

## 2024-05-06 PROCEDURE — 86593 SYPHILIS TEST NON-TREP QUANT: CPT | Performed by: NURSE PRACTITIONER

## 2024-05-06 PROCEDURE — 80053 COMPREHEN METABOLIC PANEL: CPT | Performed by: NURSE PRACTITIONER

## 2024-05-06 PROCEDURE — 1160F RVW MEDS BY RX/DR IN RCRD: CPT | Mod: CPTII,S$GLB,, | Performed by: NURSE PRACTITIONER

## 2024-05-06 PROCEDURE — 3079F DIAST BP 80-89 MM HG: CPT | Mod: CPTII,S$GLB,, | Performed by: NURSE PRACTITIONER

## 2024-05-06 PROCEDURE — 86038 ANTINUCLEAR ANTIBODIES: CPT | Performed by: NURSE PRACTITIONER

## 2024-05-06 PROCEDURE — 36415 COLL VENOUS BLD VENIPUNCTURE: CPT | Performed by: NURSE PRACTITIONER

## 2024-05-06 PROCEDURE — 86235 NUCLEAR ANTIGEN ANTIBODY: CPT | Mod: 59 | Performed by: NURSE PRACTITIONER

## 2024-05-06 PROCEDURE — 99999 PR PBB SHADOW E&M-EST. PATIENT-LVL V: CPT | Mod: PBBFAC,,, | Performed by: NURSE PRACTITIONER

## 2024-05-06 PROCEDURE — 82746 ASSAY OF FOLIC ACID SERUM: CPT | Performed by: NURSE PRACTITIONER

## 2024-05-06 PROCEDURE — 84443 ASSAY THYROID STIM HORMONE: CPT | Performed by: NURSE PRACTITIONER

## 2024-05-06 PROCEDURE — 3077F SYST BP >= 140 MM HG: CPT | Mod: CPTII,S$GLB,, | Performed by: NURSE PRACTITIONER

## 2024-05-06 PROCEDURE — 1101F PT FALLS ASSESS-DOCD LE1/YR: CPT | Mod: CPTII,S$GLB,, | Performed by: NURSE PRACTITIONER

## 2024-05-06 PROCEDURE — 1126F AMNT PAIN NOTED NONE PRSNT: CPT | Mod: CPTII,S$GLB,, | Performed by: NURSE PRACTITIONER

## 2024-05-06 PROCEDURE — 99215 OFFICE O/P EST HI 40 MIN: CPT | Mod: S$GLB,,, | Performed by: NURSE PRACTITIONER

## 2024-05-06 PROCEDURE — 1159F MED LIST DOCD IN RCRD: CPT | Mod: CPTII,S$GLB,, | Performed by: NURSE PRACTITIONER

## 2024-05-06 RX ORDER — MEMANTINE HYDROCHLORIDE 10 MG/1
10 TABLET ORAL 2 TIMES DAILY
Qty: 60 TABLET | Refills: 11 | Status: SHIPPED | OUTPATIENT
Start: 2024-05-06 | End: 2025-05-06

## 2024-05-06 NOTE — TELEPHONE ENCOUNTER
----- Message from Katy Wallace sent at 5/6/2024 12:23 PM CDT -----  Contact: [facility]  ..Type:  Patient Requesting Call    Who Called: [facility]   Does the patient know what this is regarding?: requesting referral to podiaty  Would the patient rather a call back or a response via [facility]?  call  Best Call Back Number: [phone]  Additional Information:

## 2024-05-06 NOTE — TELEPHONE ENCOUNTER
Referral made to [facility], [facility]  [phone]

## 2024-05-06 NOTE — PROGRESS NOTES
Subjective:       Patient ID: Marianne Cali is a 78 y.o. female.    Chief Complaint: Moderate vascular dementia  (With mood disturbances )    Referred by PCP: Dr. Antoni Patel MD       HPIThe patient is here for memory loss. The patient is accompanied by spouse and daughter.     The patient is presenting with memory changes and behavior changes that began in 2020 year according to patient daughter.  The main problems the patient has are related to progressive short term memory loss and severe behavioral changes. For example, the patient would repeat the same question repeatedly. The patient excessively forgets where placed certain things, she misplace clothes, shoes, and jewelry. The patient also started forgetting names. The patient is no longer driving  she stopped driving 5 years ago, she forget where she is at times. The patient is losing personal items and does not put them in odd places. Yes patient has confusion around and inside the house. The patient has  trouble remembering the date and time. The patient daughter manage her medications and doctors appointments.  The patient is unaware of major holiday, Patient aware of political changes. The patient is family manage handling finances. The patient requires some assistance with  ADLs. Patient gets agitated, combative and aggressive. The behavioral disturbance occurs at any time. Patient was started on Depokate 250 mg po BID 2 to 3 weeks ago by PCP, family states it is helpful. She is no longer throwing things are trying to leave the home. The patient has hallucinations and delusions.  She see kids and babies.She states she sees bugs in her milk.  No seizures. No language problems. No problems handling tools. Hx of stroke had left side weakness  HLD/ HTN, DM. Patient is on stroke prevention takes Eliquis 5 mg po BID. Patient has a Pacemaker present per family.   No history of headaches. No history of hypothyroidism. No history of alcoholism.  No history of  B12 deficiency. No history of depression. No history of bipolar disorder. No history of Untreated Syphilis.  No history of HIV infection. No toxic exposures.  No history of traumatic brain injury. No tremors or abnormal movements. Patient present in wheel chair, unstable gait, poor posture, able to ambulate with assistance and walker, gait instability. Pateint currently going to PT/OT 2 days per week.  Patient has urinary incontinence. Patient has sleep disturbance difficulty going sleep and good appetite. No family history of dementia.              Review of Systems              Current Outpatient Medications:     ACCU-CHEK AMELIA PLUS METER Misc, Use 1 device  three times a day, Disp: 1 each, Rfl: 0    ACCU-CHEK AMELIA PLUS TEST STRP Strp, Inject 1 strip into the skin 3 (three) times daily., Disp: 100 each, Rfl: 3    amLODIPine (NORVASC) 5 MG tablet, Take 1 tablet (5 mg total) by mouth 2 (two) times daily., Disp: 60 tablet, Rfl: 12    atorvastatin (LIPITOR) 80 MG tablet, Take 1 tablet (80 mg total) by mouth once daily., Disp: 90 tablet, Rfl: 4    blood-glucose meter,continuous (DEXCOM G7 ) Misc, 1 Device by Misc.(Non-Drug; Combo Route) route once daily., Disp: 1 each, Rfl: 0    blood-glucose sensor (DEXCOM G7 SENSOR) Philomena, 1 Device by Misc.(Non-Drug; Combo Route) route every 10 days., Disp: 3 each, Rfl: 11    carvediloL (COREG) 3.125 MG tablet, TAKE 1 TABLET BY MOUTH EVERY MORNING and ONE IN THE EVENING WITH A MEAL, Disp: , Rfl:     cholecalciferol, vitamin D3, (VITAMIN D3) 25 mcg (1,000 unit) capsule, Take 1 capsule (1,000 Units total) by mouth once daily., Disp: 90 capsule, Rfl: 3    diclofenac sodium (VOLTAREN) 1 % Gel, Apply topically., Disp: , Rfl:     divalproex (DEPAKOTE) 250 MG EC tablet, Take 1 tablet (250 mg total) by mouth every 12 (twelve) hours., Disp: 60 tablet, Rfl: 11    ELIQUIS 5 mg Tab, TAKE TWO TABLETS BY MOUTH TWICE DAILY FOR 13 DAYS, then TAKE 1 TABLET BY MOUTH TWICE DAILY FOR 17 days,  Disp: , Rfl:     GEMTESA 75 mg Tab, Take 1 tablet by mouth once daily., Disp: , Rfl:     insulin (LANTUS SOLOSTAR U-100 INSULIN) glargine 100 units/mL SubQ pen, Inject 5 Units into the skin every evening., Disp: 4.5 mL, Rfl: 3    irbesartan-hydrochlorothiazide (AVALIDE) 300-12.5 mg per tablet, irbesartan 300 mg-hydrochlorothiazide 12.5 mg tablet  TAKE 1 TABLET BY MOUTH EVERY DAY, Disp: , Rfl:     isosorbide mononitrate (IMDUR) 30 MG 24 hr tablet, TAKE 1 TABLET BY MOUTH ONCE DAILY, Disp: 90 tablet, Rfl: 1    JANUVIA 50 mg Tab, Take 1 tablet (50 mg total) by mouth once daily., Disp: 90 tablet, Rfl: 4    metFORMIN (GLUCOPHAGE) 500 MG tablet, Take 1 tablet (500 mg total) by mouth 2 (two) times daily with meals., Disp: 60 tablet, Rfl: 11    pantoprazole (PROTONIX) 40 MG tablet, Take 40 mg by mouth every morning., Disp: , Rfl:     memantine (NAMENDA) 10 MG Tab, Take 1 tablet (10 mg total) by mouth 2 (two) times daily., Disp: 60 tablet, Rfl: 11  Past Medical History:   Diagnosis Date    Allergy     Cataract     Dementia     Depression     Diabetes mellitus, type 2     Heart murmur     Hypertension     Stroke      Past Surgical History:   Procedure Laterality Date    EYE SURGERY      HIP REPLACEMENT ARTHROPLASTY      HYSTERECTOMY       Social History     Socioeconomic History    Marital status:    Tobacco Use    Smoking status: Never    Smokeless tobacco: Never   Substance and Sexual Activity    Alcohol use: Never    Drug use: Never    Sexual activity: Never     Social Determinants of Health     Financial Resource Strain: Unknown (10/22/2018)    [place] [facility] [place] Financial Resource Strain (CARDIA)     Difficulty of Paying Living Expenses: Patient declined   Food Insecurity: Unknown (2/2/2024)    [place] [facility] [place]    Hunger Vital Sign     Ran Out of Food in the Last Year: Never true   Transportation Needs: Unknown  (2/2/2024)    [place] [facility] [place]AP[place] - Transportation     Lack of Transportation (Medical): No   Physical Activity: Unknown (10/22/2018)    [place] [facility] [place]    Exercise Vital Sign     Days of Exercise per Week: Patient declined     Minutes of Exercise per Session: Patient declined   Stress: Unknown (10/22/2018)    [place] [facility] [place] [employer] - Occupational Stress Questionnaire     Feeling of Stress : Patient declined   Housing Stability: Unknown (2/2/2024)    [place] [facility] [place] Stability Vital Sign     Unstable Housing in the Last Year: No             Past/Current Medical/Surgical History, Past/Current Social History, Past/Current Family History and Past/Current Medications were reviewed in detail.        Objective:           VITAL SIGNS WERE REVIEWED      GENERAL APPEARANCE:     The patient looks comfortable.    BMI 19.37 KG     No signs of respiratory distress.    Normal breathing pattern.    No dysmorphic features    Normal eye contact.     GENERAL MEDICAL EXAM:    HEENT:  Head is atraumatic normocephalic.     No tender temporal arteries. Fundoscopic (Ophthalmoscopic) exam showed no disc edema.      Neck and Axillae: No JVD. No visible lesions.    No carotid bruits. No thyromegaly. No lymphadenopathy.    Cardiopulmonary: No cyanosis. No tachypnea. Normal respiratory effort.    Gastrointestinal/Urogenital:  No jaundice. No stomas or lesions. No visible hernias. No catheters.     Abdomen is soft non-tender. No masses or organomegaly.    Skin, Hair and Nails: No pathognonomic skin rash. No neurofibromatosis. No visible lesions.No stigmata of autoimmune disease. No clubbing.    Skin is warm and moist. No palpable masses.    Limbs: No varicose veins. No visible swelling.poor posture,  mechanical weakness     No palpable edema. Pulses are symmetric. Pedal pulses are palpable.      Muskoskeletal: +OA visible deformities.No visible lesions.    No spine tenderness. + signs of longstanding neuropathy. No dislocations or fractures.            Neurologic Exam     Mental Status   Oriented to person, place, and time.   Oriented to country, city and area.   Disoriented to month and date. Oriented to year.   Registration: recalls 3 of 3 objects.   Attention: decreased. Concentration: decreased.   Speech: speech is normal and not slurred   Level of consciousness: alert  Knowledge: poor and inconsistent with education. Unable to perform simple calculations.   Unable to name object. Unable to read. Able to repeat. Unable to write. Abnormal comprehension.     Cranial Nerves     CN II   Visual fields full to confrontation.   Visual acuity: normal  Right visual field deficit: none  Left visual field deficit: none     CN III, IV, VI   Pupils are equal, round, and reactive to light.  Extraocular motions are normal.   Right pupil: Size: 2 mm. Shape: regular. Reactivity: brisk. Consensual response: intact. Accommodation: intact.   Left pupil: Size: 2 mm. Shape: regular. Reactivity: brisk. Consensual response: intact. Accommodation: intact.   CN III: no CN III palsy  CN VI: no CN VI palsy  Nystagmus: none   Diplopia: none  Ophthalmoparesis: none  Upgaze: normal  Downgaze: normal  Conjugate gaze: present  Vestibulo-ocular reflex: present    CN V   Facial sensation intact.   Right facial sensation deficit: none  Left facial sensation deficit: none    CN VII   Right facial weakness: none  Left facial weakness: none  Left taste: normal    CN VIII   CN VIII normal.   Hearing: intact  Right Rinne: AC > BC  Left Rinne: AC > BC  Bishop: does not lateralize     CN IX, X   CN IX normal.   CN X normal.   Palate: symmetric    CN XI   CN XI normal.   Right sternocleidomastoid strength: normal  Left sternocleidomastoid strength:  normal  Right trapezius strength: normal  Left trapezius strength: normal    CN XII   CN XII normal.   Tongue: not atrophic  Fasciculations: absent  Tongue deviation: none    Motor Exam   Muscle bulk: decreased  Overall muscle tone: decreased  Right arm tone: normal  Left arm tone: normal  Right arm pronator drift: absent  Left arm pronator drift: absent  Right leg tone: normal  Left leg tone: normal    Strength   Strength 5/5 throughout.   Right neck flexion: 5/5  Left neck flexion: 5/5  Right neck extension: 5/5  Left neck extension: 5/5  Right deltoid: 5/5  Left deltoid: 5/5  Right biceps: 5/5  Left biceps: 5/5  Right triceps: 5/5  Left triceps: 5/5  Right wrist flexion: 5/5  Left wrist flexion: 5/5  Right wrist extension: 5/5  Left wrist extension: 5/5  Right interossei: 5/5  Left interossei: 5/5  Right iliopsoas: 4/5  Left iliopsoas: 5/5  Right quadriceps: 4/5  Left quadriceps: 5/5  Right hamstrin/5  Left hamstrin/5  Right glutei: 4/5  Left glutei: 5/5  Right anterior tibial: 4/5  Left anterior tibial: 5/5  Right posterior tibial: 4/5  Left posterior tibial: 5/5  Right peroneal: 4/5  Left peroneal: 5/5  Right gastroc: 4/5  Left gastroc: 5/5    Sensory Exam   Right arm light touch: normal  Left arm light touch: normal  Right leg light touch: decreased from ankle  Left leg light touch: decreased from ankle  Right arm vibration: normal  Left arm vibration: normal  Right leg vibration: decreased from ankle  Left leg vibration: decreased from ankle  Right arm proprioception: normal  Left arm proprioception: normal  Right leg proprioception: decreased from ankle  Left leg proprioception: decreased from ankle  Right arm pinprick: normal  Left arm pinprick: normal  Right leg pinprick: decreased from ankle  Left leg pinprick: decreased from ankle  Sensory deficit distribution on left: lateral cutaneous thigh  Graphesthesia: normal  Stereognosis: normal    Gait, Coordination, and Reflexes     Gait  Gait:  wide-based (mechanical weakness, present in wheel chair)    Coordination   Romberg: negative  Finger to nose coordination: normal  Heel to shin coordination: normal  Tandem walking coordination: normal    Tremor   Resting tremor: absent  Intention tremor: absent  Action tremor: absent    Reflexes   Right brachioradialis: 2+  Left brachioradialis: 2+  Right biceps: 2+  Left biceps: 2+  Right triceps: 2+  Left triceps: 2+  Right patellar: 1+  Left patellar: 1+  Right achilles: 0  Left achilles: 0  Right plantar: normal  Left plantar: normal  Right Wallace: absent  Left Wallace: absent  Right ankle clonus: absent  Left ankle clonus: absent  Right pendular knee jerk: absent  Left pendular knee jerk: absent    Ambulates only with assistance, poor posture and insatiability of right knee noted buckling          LAYLA COGNITIVE ASSESSMENT (MOCA) TOTAL SCORE          MODERATE DEMENTIA 10-19    SEVERE DEMENTIA <10    Education 12 th grade completion     DATE 05-       TOTAL SCORE 13/30       VISUOSPATIAL EXECUTIVE (5) 1       NAMING (3) 3       ATTENTION (6) 3       LANGUAGE (3) 2       ABSTRACTION(2) 1       RECALL (5) 0       ORIENTATION (6) 3           Lab Results   Component Value Date    WBC 7.05 01/19/2024    HGB 10.7 (L) 01/19/2024    HCT 34.0 (L) 01/19/2024    MCV 88 01/19/2024     01/19/2024     Sodium   Date Value Ref Range Status   05/06/2024 142 136 - 145 mmol/L Final     Potassium   Date Value Ref Range Status   05/06/2024 4.1 3.5 - 5.1 mmol/L Final     Chloride   Date Value Ref Range Status   05/06/2024 106 95 - 110 mmol/L Final     CO2   Date Value Ref Range Status   05/06/2024 21 (L) 23 - 29 mmol/L Final     Glucose   Date Value Ref Range Status   05/06/2024 82 70 - 110 mg/dL Final     BUN   Date Value Ref Range Status   05/06/2024 27 (H) 8 - 23 mg/dL Final     Creatinine   Date Value Ref Range Status   05/06/2024 1.0 0.5 - 1.4 mg/dL Final     Calcium   Date Value Ref Range Status   05/06/2024  10.0 8.7 - 10.5 mg/dL Final     Total Protein   Date Value Ref Range Status   05/06/2024 8.5 (H) 6.0 - 8.4 g/dL Final     Albumin   Date Value Ref Range Status   05/06/2024 3.5 3.5 - 5.2 g/dL Final     Total Bilirubin   Date Value Ref Range Status   05/06/2024 0.4 0.1 - 1.0 mg/dL Final     Comment:     For infants and newborns, interpretation of results should be based  on gestational age, weight and in agreement with clinical  observations.    Premature Infant recommended reference ranges:  Up to 24 hours.............<8.0 mg/dL  Up to 48 hours............<12.0 mg/dL  3-5 days..................<15.0 mg/dL  6-29 days.................<15.0 mg/dL       Alkaline Phosphatase   Date Value Ref Range Status   05/06/2024 54 (L) 55 - 135 U/L Final     AST   Date Value Ref Range Status   05/06/2024 13 10 - 40 U/L Final     ALT   Date Value Ref Range Status   05/06/2024 6 (L) 10 - 44 U/L Final     Anion Gap   Date Value Ref Range Status   05/06/2024 15 8 - 16 mmol/L Final     eGFR if    Date Value Ref Range Status   07/22/2022 39.0 (A) >60 mL/min/1.73 m^2 Final   07/22/2022 39.0 (A) >60 mL/min/1.73 m^2 Final     eGFR    Date Value Ref Range Status   10/06/2022 51 (L) >89 mL/min Final     eGFR if non    Date Value Ref Range Status   07/22/2022 33.8 (A) >60 mL/min/1.73 m^2 Final     Comment:     Calculation used to obtain the estimated glomerular filtration  rate (eGFR) is the CKD-EPI equation.      07/22/2022 33.8 (A) >60 mL/min/1.73 m^2 Final     Comment:     Calculation used to obtain the estimated glomerular filtration  rate (eGFR) is the CKD-EPI equation.        Lab Results   Component Value Date    [id number] 05/06/2024     Lab Results   Component Value Date    TSH 1.164 05/06/2024     LABORATORY EVALUATION:     05-    MARIANNE +VE, CMP NL, HIV -ve, Vitamin B12 758 NL, FA level 7.8 NL, TSH level 1.164 NL, RPR -ve, HC LEVEL 19.6^, ANA1:640,     EEG RESULTS      05-    The EEG is suggestive of diffuse encephalopathy.    RADIOLOGY EVALUATION:     01-    CTH WO    No acute intracranial disease identified.  Stable chronic findings      01-    MRI BRAIN WO    No acute intracranial abnormality.  Millimetric remote brainstem infarcts identified.   Involutional changes.   Moderate chronic microvascular ischemic disease.   Stable 8 mm cystic lesion within the right parotid gland. Recommend further evaluation with nonemergent, outpatient ultrasound.       08-    MRI BRAIN WO     No acute intracranial abnormality.    Stable appearance of a small area of hemosiderin deposition in the right parietal lobe likely indicating a remote microhemorrhage.   3. Cortical atrophy and moderate to severe leukomalacia of chronic microvascular ischemia. Small chronic lacunar infarct in the left hemipons.   4. 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        NEUROPHYSIOLOGY EVALUATION:     Reviewed the neuroimaging independently       Assessment:       1. 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 History of CVA (cerebrovascular accident)    3. Ischemic encephalopathy    4. Metabolic encephalopathy    5. Altered mental status, unspecified altered mental status type    6. Cardiac pacemaker in situ    7. Hemiplegia and hemiparesis following cerebral infarction affecting left non-dominant side    8. Coronary artery disease involving native coronary artery of native heart with other form of angina pectoris    9. Major neurocognitive disorder due to Alzheimer's disease, with behavioral disturbance    10. Other amnesia    11. Other cerebral infarction    12. Dementia without behavioral disturbance    13. Other symptoms and signs involving the nervous system    14. Hypertension associated with diabetes    15. Hyperlipidemia associated with type 2 diabetes mellitus    16. Acute on chronic congestive heart failure, unspecified heart  failure type    17. Mixed hyperlipidemia    18. History of coronary angioplasty with insertion of stent    19. History of non-ST elevation myocardial infarction (NSTEMI)    20. Chronic diastolic congestive heart failure    21. PAD (peripheral artery disease)    22. Stage 3b chronic kidney disease    23. Frequent falls    24. Type 2 diabetes mellitus with stage 3a chronic kidney disease, without long-term current use of insulin    25. Hypophosphatemia    26. Noncompliance with medication regimen    27. Generalized weakness    28. History of asthma    29. Palpitations    30. Old myocardial infarction          Modified Buck Score (m-RS)      0  The patient has no residual symptoms.    1  The patient has no significant disability; able to carry out all pre-stroke activities.    2  The patient has slight disability; unable to carry out all pre-stroke activities but able to look after self without daily help.    3  The patient has moderate disability; requiring some external help but able to walk without the assistance of another individual.    4  The patient has moderately severe disability; unable to walk or attend to bodily functions without assistance of another individual.    5  The patient has severe disability; bedridden, incontinent, requires continuous care.    6  The patient has  (during the hospital stay or after discharge from the hospital).    Plan:         MODERATE MAJOR NEUROCOGNITIVE DISORDER DUE TO ALZHEIMER'S DISEASE WITH BEHAVIORAL DISTURBANCE VS VASCULAR DEMENTIA/ HX OF MILLIMETRIC REMOTE BRAINSTEM STROKE (HTN, CAD, HLD, DM, PAD,CHF, STAGE III CKD)/ HALLUCINATIONS/ AGITATION DUE TO DEMENTIA/CARDIAC PACEMAKER IN SITU /OLD MI/HEMIPARESIS FOLLOWING CEREBRAL INFARCTION/ GAIT ABNORMALITY/ MECHANICAL WEAKNESS / FREQUENT FALLS    EVALUATION     TSH-HC, FA, B12, HIV, RPR.    Comprehensive Neuropsychological Testing     No longer driving.     Refer to Home Palliative care ( advance care planing and  supply needs)     referral for community based resources     LABS WNL Except +MARIANNE level and elevated HC level Recommend follow up with Rheumatology for further evaluation of MARIANNE, and recommend daily multivitamin 50+ for elevated HC level     DISEASE-MODIFYING AGENTS     Explained to the patient and family that medications are intended to slow down the progression (in the early stages of the disease) and not stop it or reverse the disease. The disease is relentless, progressive and so far, cannot be controlled. Progression includes Cognitive decline, Behavioral disturbances, and Motor decline as well.     I counseled the patient and family that the rate of progression is extremely variable, and the average (10 years) is an inaccurate measure.     CLASSES:    NMDA RECEPTOR ANTAGONISTS    Will start memantine/Namenda and titrate slowly to 10 mg BID. The side effects include dizziness, seizures and nightmares and discussed with patient and family. (Other formulations include Namenda XR)    CHOLINESTERASE INHIBITORS (CEI)      Unable to tolerate donepezil/Aricept caused diarrhea    SYMPTOMATIC MANAGEMENT-BEHAVIORAL SYMPTOMS AND NON-COGNITIVE SYMPTOMS       ANTIDEPRESSANTS CLASS MEDICATIONS    ANTIEPILEPTIC CLASS MEDICATIONS      Taking Valproic Acid (Depakote)  mg PO  (PCP managing) agitation and mood stabilization       ANTIPSYCHOTIC AND NEUROLEPTIC CLASS MEDICATIONS     In future:     Risperdal 1 mg one hour before sunset to assist with sundowning , psychotic symptoms and behavioral disturbances. Other options include: Aripiprazole (Abilify),  Quetiapine (Seroquel) and  Brexipiprazole (Rixulti).      CANNABINOIDS CLASS MEDICATIONS     Dronabinol (Marinol) (Synthetic Delta-9 THC) titration to 2.5 mg to 5 mg to 10 mg one hour before sunset can help with agitation, anorexia and nausea/vomiting.        HOME CARE     Refer to Home Palliative care ( advance care planing and supply needs)      referral for community based resources     Falling Down Precautions. Gait is affected by the disease as well.     Avoid driving and access to firearms     Incremental 24/Care     Help with finances and decision making.    Join support group.    Proofing the house and use labeling.    Avoid antihistamines and anticholinergics.    Avoid changing routine.    Use written reminders.    Avoid multitasking.    Healthy diet, exercise (physical and cognitive).    Good sleep hygiene.        HERE ARE 10 WAYS TO SLOW DOWN THE PROGRESSION OF DEMENTIA        1.Be physically active.    2. Avoid smoking and alcohol consumption.    3. Track your numbers. Keep your blood pressure, cholesterol, blood sugar and weight within recommended ranges.    4. Stay socially connected.    5. Make healthy food choices. Eat a well-balance and healthy diet that rich in cereals, fish, legumes, and vegetables.    6. Reduce stress.     7. Challenge your brain by trying something new, playing games, or learning a new language.    8. Take care of your hearing. Avoid being continuously exposed to loud sounds and wear a hearing aid if hearing does become a problem.    9. Lower risk of falls. Consider installing handrails on all stairs and grab bars in bathrooms.    10. Reduce your exposure to air pollution, such as exposure to exhaust in heavy traffic.         CAREGIVERS COUNSELING     Recommend reading the following books:     The 36-Hour Day and there are many online and printed resources to help caregivers as well.     Alzheimer's Through the Stages.     Dementia with G.R.A.C.E.            PREVENTION OF DELIRIUM       1. Good hydration and avoid electrolyte imbalance  2. Recognize and treat infections immediately especially UTI.  3. Bladder emptying and prevent constipation.   4. Provide stimulating activities and familiar objects  5. Use eyeglasses and hearing aids if needed.   6. Use simple and regular communication about people, current place, and  "time  7. Mobility and range-of-motion exercises  8. Reduce noise, lighting and avoid sleep interruptions  9. Non-narcotic pain management.  10.Nondrug treatment for sleep problems or anxiety  11. Avoid antihistamines.  12. Avoid narcotics.  13. Avoid benzodiazepines.            HELPFUL STRATEGIES FOR THE FAMILY AND CAREGIVERS        BEHAVIORAL MANAGEMENT STRATEGIES     Remember that you cannot reason with acute psychosis or confusion. Unless there is an immediate safety issue, there is no need to challenge or correct the person.  For example, if a pt is hallucinating, do not argue with the person that what they are seeing is not real. If they are expressing paranoia, empathize gently with their fear, and attempt to redirect them to a different activity.   If the person is particularly stuck on a topic or activity, as long as the activity is safe and is not worsening their agitation, you don't need to intervene.     Communicate calmly, simply, and concisely    Reassure the person that they are safe and you are here to help  Try not to express irritation or anger  Speak quietly and calmly, do not shout or threaten the person. Avoid provocation.   Establish verbal contact and provide orientation and reassurance.  Attempt to identify the patient's wants and feelings, listen to what they are saying, and reflect those wants and feelings back to the patient.  Dont use sarcasm as a weapon    Set clear limits on behavior in a calm voice ("You cannot hit the nurse.")  Offer choices and optimism ("Which toothpaste would you like to use today?")    Redirect the person to an alternative behavior ("Can you come help me with this?)    Avoid saying things like, "We already went over this!" "You can't keep doing this." "I already explained this to you"  Instead, simply nod calmly and acknowledge what the person is saying ("Yes, that makes sense."), and then request their help or company in a different behavior.   Having a mental " list of activities the patient enjoys can be helpful with redirection. For example, do they enjoy going for walks? Eating a piece of candy?   If redirection becomes challenging, you can place objects that your family member enjoys strategically in the home in order to use for distraction. This is particularly useful if there are items that they often talk about or frequently ask you questions about; or if they are visually striking. Once you focus the person on this object, talk about it briefly until they are calm and then attempt to redirect to a new behavior.   Sometimes activities which are repetitive can be calming, particularly if there is a comforting sensory element. For example, pt may enjoy folding soft towels or laundry.    Limit overstimulation    Decrease distractions by turning off the TV, computer, any fluorescent lights that hum, etc.  Ask any casual visitors to leave--the fewer people the better  If the person is very agitated, avoid touching them, and respect their personal space  Sit down and ask the person to sit down also     PREVENTATIVE STRATEGIES     Try to help the patient develop a routine and stick to it  Address all immediate safety issues  Does the person still have access to the car and keys? Take the keys away, or disconnect the car battery.  Are they wandering at night? Install locks on the outside doors. A keypad lock works well. Alarms on bedroom doors can also be helpful.   Are they turning on the stove and risking a fire? If you cannot limit their access to the kitchen, you may need to unplug dangerous devices any time you are not in the room.   If the person is exhibiting poor judgment throughout the day, they likely need someone supervising them at all times.   Do they still have access to significant financial assets? Limit their access to accounts, and consult with a  if necessary.   Identify situations that seem to trigger agitation or a problematic behavior, and modify  "the person's environment to minimize or eliminate that trigger.   For example, is their behavior worse if they don't get a good night's sleep? Or if they don't eat or drink enough? If so, prioritize those activities and build behavior plans to support them.  Do they get upset about not being allowed to answer the phone when it rings? Put all of the phones in the house on "silent."   Do they become paranoid that certain family members are trying to take advantage of them? Limit contact with the targeted family member as much as possible, and re-introduce them slowly after some time has passed.   Encourage independence in daily living whenever safely possible  Encourage collaborative decision-making regarding his care as well as daily activities  Encourage regular physical exercise  Address issues that may be impacting sleep   Ex: Urology consult for frequent nighttime urination, address chronic pain that may be interfering with sleep, moving to a different room if his roommate snores loudly, make sure the room is a comfortable temperature and he has adequate pillows and blankets  Ensure he gets out into the sunlight at least once per day to encourage regular circadian cycle  No caffeine, sugar, or large meals within two hours of bedtime   Make sure room at night is dark and quiet and minimize nighttime interruptions from staff unless medically necessary   Try to help pt go to sleep and wake up at the same time every day  Monitor closely for worsening psychiatric symptoms (insomnia, social withdrawal, deterioration of personal hygiene, hostility, confusing or nonsensical speech, paranoia, hallucinations) and intervene promptly. Assess for possible antecedents, modify environment appropriately.            SLEEP HYGIENE     Poor sleep has a negative effect on cognition. Several strategies have been shown to improve sleep:     Caffeine intake in the afternoon and evening, as well as stuffing oneself at supper, can " decrease the quality of restful sleep throughout the night.   Bedtime and wake-up times should be consistent every night and morning so the body becomes used to a single routine, even on the weekends.  Engage in daily physical activity, but not 2-3 hours before bedtime.   No technology use (television, computer, iPad) 1-2 hours before bed.   Have a wind down routine (e.g., soft lights in the house, bath before bed, reduced fluid intake, songs, reading, less noise) to promote sleep readiness.   Visit the www.sleepfoundation.org for more strategies.      COGNITIVE HYGIENE     Engage in regular exercise, which increases alertness and arousal and can improve attention and focus.  Consider lower impact exercises, such as yoga or light walking.  Get a good nights sleep, as this can enhance alertness and cognition.  Eat healthy foods and balanced meals. It is notable that research indicates certain nutrients may aid in brain function, such as B vitamins (especially B6, B12, and folic acid), antioxidants (such as vitamins C and E, and beta carotene), and Omega-3 fatty acids. Talk with your physician or nutritionist about whats right for you.   Keep your brain active. Find activities to stay mentally active, such as reading, games (cards, checkers), puzzles (crosswords, Sudoku, jig saw), crafts (models, woodworking), gardening, or participating in activities in the community.  Stay socially engaged. Continue staying active with your family and friends.      FUTURE PLANNING     The patient and caregivers should consider formal arrangements to allow a designated person to make medical and financial decisions for the pt, should he/she become unable to do so.  Options to consider include designating a healthcare proxy, medical and/or financial power of , and completing advanced directives for healthcare decisions and estate planning (e.g., finalizing a will).  If cost is prohibitive, [facility]  (https://MarketMeSuitels.org/) provides free  for individuals with low income.       ADDITIONAL RESOURCES     Alzheimer's Services of the [facility] (www.http://alzbr.org) have good local caregiver and patient resources.   Consider resources for support through the Governors Office of Elderly Affairs (http://goea.[place].gov/), [place] Chapter of the Alzheimers Association (www.alz.org/[place]/), the Family Caregiver [place] (www.caregiver.org), and the American Psychological Association (http://www.apa.org/pi/about/publications/caregivers/consumers/index.aspxconsumers/index.aspx).  For More Information About Hallucinations, Delusions, and Paranoia in Alzheimer's LIN Alzheimer's and related Dementias Education and Referral (ADEAR) Center 1-891.287.5580 (toll-free) adear@lin.nih.gov www.lin.nih.gov/alzheimers The National [place] on Aging's ADEAR Center offers information and free print publications about Alzheimer's disease and related dementias for families, caregivers, and health professionals. ADEAR Center staff answer telephone, email, and written requests and make referrals to local and national resources      HX OF MILLIMETRIC REMOTE BRAINSTEM STROKE (HTN, CAD, HLD, DM)     EVALUATE:    CT HEAD WO    CTA head and neck     Medical supportive care    PT/OT/ST.    Takes Eliquis 5 mg po BID    Vascular Risk Factors (VRFs) stratification (BP, BS, BC control and Smoking Cessation) is the mainstay of stroke prevention (>90%). The benefit of antiplatelets is < 20% stroke risk reduction.         Healthy diet and exercise    Avoid driving.    Call 911 if any SUDDEN:   Weakness  Numbness  Slurring of speech  Speech difficulty   Vertigo  Loss of balance  Loss of vision  Loss of hearing  Double vision  Trouble swallowing  Trouble breathing  Facial drooping        MEDICAL/SURGICAL COMORBIDITIES     All relevant medical comorbidities noted and managed by primary care physician and medical care team.           MISCELLANEOUS MEDICAL PROBLEMS       HEALTHY LIFESTYLE AND PREVENTATIVE CARE    Encouraged the patient to adhere to the age-appropriate health maintenance guidelines including screening tests and vaccinations.     Discussed the overall importance of healthy lifestyle, optimal weight, exercise, healthy diet, good sleep hygiene and avoiding drugs including smoking, alcohol and recreational drugs. The patient verbalized full understanding.       Advised the patient to follow COVID-19 prevention measures.       I spent 120 minutes more than 50 % spent  face to face with the patient    Time spent in counseling and coordination of care including discussions etiology of diagnosis, pathogenesis of diagnosis, prognosis of diagnosis,, diagnostic results, impression and recommendations, diagnostic studies, management, risks and benefits of treatment, instructions of disease self-management, treatment instructions, follow up requirements, patient and family counseling/involvement in care compliance with treatment regimen. All of the patient's questions were answered during this discussion.       Joon Bess, MSN NP      Collaborating Provider: Yeimy Flores MD, FAAN Neurologist/Epileptologist

## 2024-05-06 NOTE — PATIENT INSTRUCTIONS
HERE ARE 10 WAYS TO REDUCE RISK OF DEMENTIA AND SLOW DOWN THE PROGRESSION OF DEMENTIA        1.Be physically active.    2. Avoid smoking and alcohol consumption.    3. Track your numbers. Keep your blood pressure, cholesterol, blood sugar and weight within recommended ranges.    4. Stay socially connected.    5. Make healthy food choices. Eat a well-balance and healthy diet that rich in cereals, fish, legumes, and vegetables.    6. Reduce stress.     7. Challenge your brain by trying something new, playing games, or learning a new language.    8. Take care of your hearing. Avoid being continuously exposed to loud sounds and wear a hearing aid if hearing does become a problem.    9. Lower risk of falls. Consider installing handrails on all stairs and grab bars in bathrooms.    10. Reduce your exposure to air pollution, such as exposure to exhaust in heavy traffic.         CAREGIVERS COUNSELING     Recommend reading the following books:     The 36-Hour Day and there are many online and printed resources to help caregivers as well.     Alzheimer's Through the Stages.     Dementia with G.R.A.C.E.            PREVENTION OF DELIRIUM       1. Good hydration and avoid electrolyte imbalance  2. Recognize and treat infections immediately especially UTI.  3. Bladder emptying and prevent constipation.   4. Provide stimulating activities and familiar objects  5. Use eyeglasses and hearing aids if needed.   6. Use simple and regular communication about people, current place, and time  7. Mobility and range-of-motion exercises  8. Reduce noise, lighting and avoid sleep interruptions  9. Non-narcotic pain management.  10.Nondrug treatment for sleep problems or anxiety  11. Avoid antihistamines.  12. Avoid narcotics.  13. Avoid benzodiazepines.            HELPFUL STRATEGIES FOR THE FAMILY AND CAREGIVERS        BEHAVIORAL MANAGEMENT STRATEGIES     Remember that you cannot reason with acute psychosis or confusion. Unless there is  "an immediate safety issue, there is no need to challenge or correct the person.  For example, if a pt is hallucinating, do not argue with the person that what they are seeing is not real. If they are expressing paranoia, empathize gently with their fear, and attempt to redirect them to a different activity.   If the person is particularly stuck on a topic or activity, as long as the activity is safe and is not worsening their agitation, you don't need to intervene.     Communicate calmly, simply, and concisely    Reassure the person that they are safe and you are here to help  Try not to express irritation or anger  Speak quietly and calmly, do not shout or threaten the person. Avoid provocation.   Establish verbal contact and provide orientation and reassurance.  Attempt to identify the patient's wants and feelings, listen to what they are saying, and reflect those wants and feelings back to the patient.  Dont use sarcasm as a weapon    Set clear limits on behavior in a calm voice ("You cannot hit the nurse.")  Offer choices and optimism ("Which toothpaste would you like to use today?")    Redirect the person to an alternative behavior ("Can you come help me with this?)    Avoid saying things like, "We already went over this!" "You can't keep doing this." "I already explained this to you"  Instead, simply nod calmly and acknowledge what the person is saying ("Yes, that makes sense."), and then request their help or company in a different behavior.   Having a mental list of activities the patient enjoys can be helpful with redirection. For example, do they enjoy going for walks? Eating a piece of candy?   If redirection becomes challenging, you can place objects that your family member enjoys strategically in the home in order to use for distraction. This is particularly useful if there are items that they often talk about or frequently ask you questions about; or if they are visually striking. Once you focus the " "person on this object, talk about it briefly until they are calm and then attempt to redirect to a new behavior.   Sometimes activities which are repetitive can be calming, particularly if there is a comforting sensory element. For example, pt may enjoy folding soft towels or laundry.    Limit overstimulation    Decrease distractions by turning off the TV, computer, any fluorescent lights that hum, etc.  Ask any casual visitors to leave--the fewer people the better  If the person is very agitated, avoid touching them, and respect their personal space  Sit down and ask the person to sit down also     PREVENTATIVE STRATEGIES     Try to help the patient develop a routine and stick to it  Address all immediate safety issues  Does the person still have access to the car and keys? Take the keys away, or disconnect the car battery.  Are they wandering at night? Install locks on the outside doors. A keypad lock works well. Alarms on bedroom doors can also be helpful.   Are they turning on the stove and risking a fire? If you cannot limit their access to the kitchen, you may need to unplug dangerous devices any time you are not in the room.   If the person is exhibiting poor judgment throughout the day, they likely need someone supervising them at all times.   Do they still have access to significant financial assets? Limit their access to accounts, and consult with a  if necessary.   Identify situations that seem to trigger agitation or a problematic behavior, and modify the person's environment to minimize or eliminate that trigger.   For example, is their behavior worse if they don't get a good night's sleep? Or if they don't eat or drink enough? If so, prioritize those activities and build behavior plans to support them.  Do they get upset about not being allowed to answer the phone when it rings? Put all of the phones in the house on "silent."   Do they become paranoid that certain family members are trying to " take advantage of them? Limit contact with the targeted family member as much as possible, and re-introduce them slowly after some time has passed.   Encourage independence in daily living whenever safely possible  Encourage collaborative decision-making regarding his care as well as daily activities  Encourage regular physical exercise  Address issues that may be impacting sleep   Ex: Urology consult for frequent nighttime urination, address chronic pain that may be interfering with sleep, moving to a different room if his roommate snores loudly, make sure the room is a comfortable temperature and he has adequate pillows and blankets  Ensure he gets out into the sunlight at least once per day to encourage regular circadian cycle  No caffeine, sugar, or large meals within two hours of bedtime   Make sure room at night is dark and quiet and minimize nighttime interruptions from staff unless medically necessary   Try to help pt go to sleep and wake up at the same time every day  Monitor closely for worsening psychiatric symptoms (insomnia, social withdrawal, deterioration of personal hygiene, hostility, confusing or nonsensical speech, paranoia, hallucinations) and intervene promptly. Assess for possible antecedents, modify environment appropriately.            SLEEP HYGIENE     Poor sleep has a negative effect on cognition. Several strategies have been shown to improve sleep:     Caffeine intake in the afternoon and evening, as well as stuffing oneself at supper, can decrease the quality of restful sleep throughout the night.   Bedtime and wake-up times should be consistent every night and morning so the body becomes used to a single routine, even on the weekends.  Engage in daily physical activity, but not 2-3 hours before bedtime.   No technology use (television, computer, iPad) 1-2 hours before bed.   Have a wind down routine (e.g., soft lights in the house, bath before bed, reduced fluid intake, songs, reading,  less noise) to promote sleep readiness.   Visit the www.sleepfoundation.org for more strategies.      COGNITIVE HYGIENE     Engage in regular exercise, which increases alertness and arousal and can improve attention and focus.  Consider lower impact exercises, such as yoga or light walking.  Get a good nights sleep, as this can enhance alertness and cognition.  Eat healthy foods and balanced meals. It is notable that research indicates certain nutrients may aid in brain function, such as B vitamins (especially B6, B12, and folic acid), antioxidants (such as vitamins C and E, and beta carotene), and Omega-3 fatty acids. Talk with your physician or nutritionist about whats right for you.   Keep your brain active. Find activities to stay mentally active, such as reading, games (cards, checkers), puzzles (crosswords, Sudoku, jig saw), crafts (models, woodworking), gardening, or participating in activities in the community.  Stay socially engaged. Continue staying active with your family and friends.      FUTURE PLANNING     The patient and caregivers should consider formal arrangements to allow a designated person to make medical and financial decisions for the pt, should he/she become unable to do so.  Options to consider include designating a healthcare proxy, medical and/or financial power of , and completing advanced directives for healthcare decisions and estate planning (e.g., finalizing a will).  If cost is prohibitive, [facility] Legal Services (https://Playdate App.org/) provides free  for individuals with low income.       ADDITIONAL RESOURCES     Alzheimer's Services of the [facility] (www.http://alzbr.org) have good local caregiver and patient resources.   Consider resources for support through the Governors Office of Elderly Affairs (http://goea.[place].gov/), [place] Chapter of the Alzheimers Association (www.alz.org/[place]/), the Family Caregiver [place]  (www.caregiver.org), and the American Psychological Association (http://www.apa.org/pi/about/publications/caregivers/consumers/index.aspxconsumers/index.aspx).  For More Information About Hallucinations, Delusions, and Paranoia in Alzheimer's LIN Alzheimer's and related Dementias Education and Referral (ADEAR) Center 1-101.120.6292 (toll-free) adear@lin.nih.gov www.lin.nih.gov/alzheimers The National [place] on Aging's ADEAR Center offers information and free print publications about Alzheimer's disease and related dementias for families, caregivers, and health professionals. ADEAR Center staff answer telephone, email, and written requests and make referrals to local and national resources

## 2024-05-06 NOTE — TELEPHONE ENCOUNTER
I have signed for the following orders AND/OR meds.  Please call the patient and ask the patient to schedule the testing AND/OR inform about any medications that were sent.     Orders Placed This Encounter   Procedures    Ambulatory referral/consult to Podiatry     Standing Status:   Future     Standing Expiration Date:   6/6/2025     Referral Priority:   Routine     Referral Type:   Consultation     Referral Reason:   Specialty Services Required     Requested Specialty:   Podiatry     Number of Visits Requested:   1

## 2024-05-07 LAB
ANA PATTERN 1: NORMAL
ANA PATTERN 2: NORMAL
ANA SER QL IF: POSITIVE
ANA TITER 2: NORMAL
ANA TITR SER IF: >2560 {TITER}

## 2024-05-08 NOTE — PROGRESS NOTES
LABORATORY EVALUATION:    05-    MARIANNE +VE, CMP NL, HIV -ve, Vitamin B12 758 NL, FA level 7.8 NL, TSH level 1.164 NL, RPR -ve, HC LEVEL 19.6^, ANA1:640,     LABS WNL Except +MARIANNE level and elevated HC level Recommend follow up with Rheumatology for further evaluation of MARIANNE, and recommend daily multivitamin 50+ for elevated HC level

## 2024-05-09 ENCOUNTER — DOCUMENT SCAN (OUTPATIENT)
Dept: HOME HEALTH SERVICES | Facility: HOSPITAL | Age: 79
End: 2024-05-09
Payer: MEDICARE

## 2024-05-09 LAB
ANTI SM ANTIBODY: 0.09 RATIO (ref 0–0.99)
ANTI SM/RNP ANTIBODY: 0.21 RATIO (ref 0–0.99)
ANTI-SM INTERPRETATION: NEGATIVE
ANTI-SM/RNP INTERPRETATION: NEGATIVE
ANTI-SSA ANTIBODY: 5.67 RATIO (ref 0–0.99)
ANTI-SSA INTERPRETATION: POSITIVE
ANTI-SSB ANTIBODY: 6.01 RATIO (ref 0–0.99)
ANTI-SSB INTERPRETATION: POSITIVE
DNA TITER: NORMAL
DSDNA AB SER-ACNC: POSITIVE [IU]/ML

## 2024-05-13 ENCOUNTER — OFFICE VISIT (OUTPATIENT)
Dept: PODIATRY | Facility: CLINIC | Age: 79
End: 2024-05-13
Payer: MEDICARE

## 2024-05-13 DIAGNOSIS — E11.9 ENCOUNTER FOR COMPREHENSIVE DIABETIC FOOT EXAMINATION, TYPE 2 DIABETES MELLITUS: Primary | ICD-10-CM

## 2024-05-13 DIAGNOSIS — Z86.73 HISTORY OF CVA (CEREBROVASCULAR ACCIDENT): ICD-10-CM

## 2024-05-13 DIAGNOSIS — M20.41 HAMMERTOES OF BOTH FEET: ICD-10-CM

## 2024-05-13 DIAGNOSIS — E11.22 TYPE 2 DIABETES MELLITUS WITH STAGE 3A CHRONIC KIDNEY DISEASE, WITHOUT LONG-TERM CURRENT USE OF INSULIN: ICD-10-CM

## 2024-05-13 DIAGNOSIS — N18.31 TYPE 2 DIABETES MELLITUS WITH STAGE 3A CHRONIC KIDNEY DISEASE, WITHOUT LONG-TERM CURRENT USE OF INSULIN: ICD-10-CM

## 2024-05-13 DIAGNOSIS — L84 CORN OR CALLUS: ICD-10-CM

## 2024-05-13 DIAGNOSIS — L60.3 ONYCHODYSTROPHY: ICD-10-CM

## 2024-05-13 DIAGNOSIS — M20.42 HAMMERTOES OF BOTH FEET: ICD-10-CM

## 2024-05-13 DIAGNOSIS — I69.354 HEMIPLEGIA AND HEMIPARESIS FOLLOWING CEREBRAL INFARCTION AFFECTING LEFT NON-DOMINANT SIDE: ICD-10-CM

## 2024-05-13 DIAGNOSIS — Z79.01 ANTICOAGULATED: ICD-10-CM

## 2024-05-13 PROCEDURE — 1125F AMNT PAIN NOTED PAIN PRSNT: CPT | Mod: CPTII,S$GLB,, | Performed by: PODIATRIST

## 2024-05-13 PROCEDURE — 99999 PR PBB SHADOW E&M-EST. PATIENT-LVL IV: CPT | Mod: PBBFAC,,, | Performed by: PODIATRIST

## 2024-05-13 PROCEDURE — 1101F PT FALLS ASSESS-DOCD LE1/YR: CPT | Mod: CPTII,S$GLB,, | Performed by: PODIATRIST

## 2024-05-13 PROCEDURE — 11721 DEBRIDE NAIL 6 OR MORE: CPT | Mod: Q9,59,S$GLB, | Performed by: PODIATRIST

## 2024-05-13 PROCEDURE — 3288F FALL RISK ASSESSMENT DOCD: CPT | Mod: CPTII,S$GLB,, | Performed by: PODIATRIST

## 2024-05-13 PROCEDURE — 99204 OFFICE O/P NEW MOD 45 MIN: CPT | Mod: 25,S$GLB,, | Performed by: PODIATRIST

## 2024-05-13 PROCEDURE — 11056 PARNG/CUTG B9 HYPRKR LES 2-4: CPT | Mod: Q9,S$GLB,, | Performed by: PODIATRIST

## 2024-05-13 PROCEDURE — 1159F MED LIST DOCD IN RCRD: CPT | Mod: CPTII,S$GLB,, | Performed by: PODIATRIST

## 2024-05-13 PROCEDURE — 1160F RVW MEDS BY RX/DR IN RCRD: CPT | Mod: CPTII,S$GLB,, | Performed by: PODIATRIST

## 2024-05-13 NOTE — PROGRESS NOTES
Subjective:       Patient ID: Marianne Cali is a 78 y.o. female.    Chief Complaint: Foot Pain (Patient complain 3/10 pain, Pt is diabetic, takes eliquis. Was last seen 4.30.24 by Antoni Patel, discoloration noted and Pt states black spot under left 2nd toe )      HPI: Marianne Cali presents to the office today, under referral by , Antoni Patel MD, for her annual diabetic foot assessment and risk evaluation.  Patient is a DMII.. This patient last saw his/her internal/family medicine physician on 04/30/2024.  Complains of pain to the 2nd toes bilaterally.  Concerned about potential wound/black area discoloration.  States 3/10 pain.  Does have history of CVA and on long-term anticoagulation therapy.  Presents with family members present.  Last A1c 8.5.    Hemoglobin A1C   Date Value Ref Range Status   04/11/2024 8.5 (H) 4.0 - 5.6 % Final     Comment:     ADA Screening Guidelines:  5.7-6.4%  Consistent with prediabetes  >or=6.5%  Consistent with diabetes    High levels of fetal hemoglobin interfere with the HbA1C  assay. Heterozygous hemoglobin variants (HbS, HgC, etc)do  not significantly interfere with this assay.   However, presence of multiple variants may affect accuracy.     01/19/2024 11.5 (H) 4.0 - 5.6 % Final     Comment:     ADA Screening Guidelines:  5.7-6.4%  Consistent with prediabetes  >or=6.5%  Consistent with diabetes    High levels of fetal hemoglobin interfere with the HbA1C  assay. Heterozygous hemoglobin variants (HbS, HgC, etc)do  not significantly interfere with this assay.   However, presence of multiple variants may affect accuracy.     01/13/2024 11.6 (H) 4.6 - 6.2 % Final   08/21/2023 8.1 (H) 4.0 - 5.6 % Final     Comment:     ADA Screening Guidelines:  5.7-6.4%  Consistent with prediabetes  >or=6.5%  Consistent with diabetes    High levels of fetal hemoglobin interfere with the HbA1C  assay. Heterozygous hemoglobin variants (HbS, HgC, etc)do  not significantly interfere with this  assay.   However, presence of multiple variants may affect accuracy.     .    Review of patient's allergies indicates:   Allergen Reactions    Morphine Anxiety and Other (See Comments)     Dizziness       Past Medical History:   Diagnosis Date    Allergy     Cataract     Dementia     Depression     Diabetes mellitus, type 2     Heart murmur     Hypertension     Stroke        Family History   Problem Relation Name Age of Onset    Cancer Mother      Hypertension Mother      Cancer Sister         Social History     Socioeconomic History    Marital status:    Tobacco Use    Smoking status: Never    Smokeless tobacco: Never   Substance and Sexual Activity    Alcohol use: Never    Drug use: Never    Sexual activity: Never     Social Determinants of Health     Financial Resource Strain: Unknown (10/22/2018)    Received from [facility]    Overall Financial Resource Strain (CARDIA)     Difficulty of Paying Living Expenses: Patient declined   Food Insecurity: Unknown (2/2/2024)    Received from [facility]    Hunger Vital Sign     Ran Out of Food in the Last Year: Never true   Transportation Needs: Unknown (2/2/2024)    Received from [facility]    PRAPARE - Transportation     Lack of Transportation (Medical): No   Physical Activity: Unknown (10/22/2018)    Received from [facility]    Exercise Vital Sign     Days of Exercise per Week: Patient declined     Minutes of Exercise per Session: Patient declined   Stress: Unknown (10/22/2018)    Received from [facility]    [employer] [place] [employer] - Occupational Stress Questionnaire     Feeling of Stress : Patient declined   Housing Stability: Unknown (2/2/2024)    Received from [facility]    Housing Stability Vital Sign      Unstable Housing in the Last Year: No       Past Surgical History:   Procedure Laterality Date    EYE SURGERY      HIP REPLACEMENT ARTHROPLASTY      HYSTERECTOMY         Review of Systems        Objective:   There were no vitals taken for this visit.    Physical Exam  LOWER EXTREMITY PHYSICAL EXAMINATION    ORTHOPEDIC:  There is mild to moderate semi-rigid hammer toe contracture to the right and left foot at the 2nd toe, 3rd toe, and 4th toe. Associated metatarsalgia is noted. Plantar fat pad atrophy with displacement is  noted. Equinus contracture is noted.  Rectus foot type is noted.  Weakness present to the left lower extremity secondary to history of CVA      VASCULAR:  The right dorsalis pedis pulse 2/4 and the right posterior tibial pulse 2/4.  The left dorsalis pedis pulse 2/4 and posterior tibial pulse on the left is 2/4.  Capillary refill is intact.  Pedal hair growth intact    NEUROLOGY:  Protective sensation is intact with [place] Nataliia monofilament. Proprioception is intact. Intact sensation to light touch.  Vibratory sensation is diminished to the 1st metatarsal phalangeal joint.     DERMATOLOGY:  Skin is supple, moist, intact.  Callusing noted to the distal aspect of the 2nd toe bilaterally.  No signs of underlying ulceration.  The R1, 2, 5 and left L1,2, 5 are thickened, discolored dystrophic.  There is subungual debris.  Nail plates have area of dark discoloration.  The remaining nails 3-4 on the right foot and the left foot are elongated but of normal color, thickness, and texture.   There is no signs of ingrowing into the medial or lateral borders.  There is no evidence of wounds or skin breakdown.  No edema or erythema.  No obvious lacerations or fissuring.  Interdigital spaces are clean, dry, intact.  No rashes or scars appreciated.    Assessment:     1. Encounter for comprehensive diabetic foot examination, type 2 diabetes mellitus    2. Type 2 diabetes mellitus with stage 3a chronic kidney  disease, without long-term current use of insulin    3. History of CVA (cerebrovascular accident)    4. Hemiplegia and hemiparesis following cerebral infarction affecting left non-dominant side    5. Anticoagulated    6. Onychodystrophy    7. Hammertoes of both feet    8. Corn or callus        Plan:     Encounter for comprehensive diabetic foot examination, type 2 diabetes mellitus    Type 2 diabetes mellitus with stage 3a chronic kidney disease, without long-term current use of insulin  -     Ambulatory referral/consult to Podiatry    History of CVA (cerebrovascular accident)    Hemiplegia and hemiparesis following cerebral infarction affecting left non-dominant side    Anticoagulated    Onychodystrophy    Hammertoes of both feet    Corn or callus    Thorough discussion is had with the patient today, concerning the diagnosis, its etiology, and the treatment algorithm at present.    I counseled the patient on his/her Diabetic Mellitus regarding today's clinical examination and objection findings. We did also discuss recent medication changes, pertinent labs and imaging evaluations and other medical consultation notes and progress notes. Greater than 50% of this visit was spent on counseling and coordination of care. Greater than 20 minutes of this appt. was spent on education about the diabetic foot, in relation to PVD and/or neuropathy, and the prevention of limb loss.     Shoe gear is inspected and wear and proper fit/type. Patient is reminded of the importance of good nutrition and blood sugar control to help prevent podiatric complications of diabetes. Patient instructed on proper foot hygeine. We discussed wearing proper shoe gear, daily foot inspections, never walking without protective shoe gear, never putting sharp instruments to feet.  Patient  will continue to monitor the areas daily, inspect feet, wear protective shoe gear when ambulatory, moisturizer to maintain skin integrity.     Patient's DMI/DMII is  managed by Internal/Family Medicine Physician and/or Endocrinology Advanced Practice Provider.    Dystrophic nail plates, as outlined above (R#1,2,5  ; L#1,2,5 ), are sharply debrided with double action nail nipper, and/or with the assistance of a mechanical rotary alma, with removal of all offending nail and nail border(s), for reduction of pains. Nails are reduced in terms of length, width and girth with removal of subungual debris to facilitate pain free weight bearing and ambulation. The elongated nails as outlined in the objective portion of this note, were trimmed to appropriate length, with a double action nail nipper, for alleviation/reduction of pains as well. Follow up in approx. 3-4 months.    Hypertrophic skin formation x2, as outlined within the examination portion of this note, is surgically debrided with sharp #10/#15 blade, to alleviate discomfort with weight bearing and ambulation, and to lessen the possibility of skin complications, e.g., ulceration due to pressure. No ulceration(s) is are noted with/post debridement. The lesion is completed healed and resolved. No evidence of infection.     This patient does have hammertoe (digital) contractures. I did advise the patient to ambulate with shoe gear that is high in the tox box to allow for extra room and depth in the sagittal plane, in order to alleviate and lessen the potential for dorsal digital break down at the IPJs. I do also recommended shoe gear that is soft and supple in the foot bed as to lessen the potential for plantar distal digital break down at the contracted digits.

## 2024-05-14 ENCOUNTER — CLINICAL SUPPORT (OUTPATIENT)
Dept: FAMILY MEDICINE | Facility: CLINIC | Age: 79
End: 2024-05-14
Payer: MEDICARE

## 2024-05-14 VITALS — DIASTOLIC BLOOD PRESSURE: 67 MMHG | SYSTOLIC BLOOD PRESSURE: 128 MMHG | HEART RATE: 76 BPM

## 2024-05-14 DIAGNOSIS — Z01.30 BP CHECK: Primary | ICD-10-CM

## 2024-05-14 PROCEDURE — 99999 PR PBB SHADOW E&M-EST. PATIENT-LVL III: CPT | Mod: PBBFAC,,,

## 2024-05-14 NOTE — PROGRESS NOTES
Blood pressure is within normal limits.  Continue current medication.  Hypertension Medications               amLODIPine (NORVASC) 5 MG tablet Take 1 tablet (5 mg total) by mouth 2 (two) times daily.    carvediloL (COREG) 3.125 MG tablet TAKE 1 TABLET BY MOUTH EVERY MORNING and ONE IN THE EVENING WITH A MEAL    irbesartan-hydrochlorothiazide (AVALIDE) 300-12.5 mg per tablet irbesartan 300 mg-hydrochlorothiazide 12.5 mg tablet   TAKE 1 TABLET BY MOUTH EVERY DAY    isosorbide mononitrate (IMDUR) 30 MG 24 hr tablet TAKE 1 TABLET BY MOUTH ONCE DAILY

## 2024-05-14 NOTE — PATIENT INSTRUCTIONS
No follow-ups on file.     Dear patient,   As a result of recent federal legislation (The Federal Cures Act), you may receive lab or pathology results from your visit in your [facility] account before your physician is able to contact you. Your physician or their representative will relay the results to you with their recommendations at their soonest availability.     If no improvement in symptoms or symptoms worsen, please be advised to call MD, follow-up at clinic and/or go to ER if becomes severe.    Antoni Patel M.D.        We Offer TELEHEALTH & Same Day Appointments!   Book your Telehealth appointment with me through my nurse or   Clinic appointments on [employer]!    [address] [place] [address]    Office: [phone]   FAX: [phone]    Check out my Facebook Page and Follow Me at: [email or web address]    Check out my website at [email or web address] by clicking on: [email or web address]    To Schedule appointments online, go to [employer]: [email or web address]

## 2024-05-18 ENCOUNTER — PATIENT MESSAGE (OUTPATIENT)
Dept: FAMILY MEDICINE | Facility: CLINIC | Age: 79
End: 2024-05-18
Payer: MEDICARE

## 2024-05-20 ENCOUNTER — HOSPITAL ENCOUNTER (OUTPATIENT)
Dept: RADIOLOGY | Facility: HOSPITAL | Age: 79
Discharge: HOME | End: 2024-05-20
Attending: FAMILY MEDICINE
Payer: MEDICARE

## 2024-05-20 DIAGNOSIS — I73.9 PAD (PERIPHERAL ARTERY DISEASE): Primary | ICD-10-CM

## 2024-05-20 DIAGNOSIS — I73.9 PAD (PERIPHERAL ARTERY DISEASE): ICD-10-CM

## 2024-05-20 PROCEDURE — 93925 LOWER EXTREMITY STUDY: CPT | Mod: TC,PO

## 2024-05-20 PROCEDURE — 93922 UPR/L XTREMITY ART 2 LEVELS: CPT | Mod: 26,,, | Performed by: STUDENT IN AN ORGANIZED HEALTH CARE EDUCATION/TRAINING PROGRAM

## 2024-05-20 PROCEDURE — 93925 LOWER EXTREMITY STUDY: CPT | Mod: 26,,, | Performed by: STUDENT IN AN ORGANIZED HEALTH CARE EDUCATION/TRAINING PROGRAM

## 2024-05-21 NOTE — PROGRESS NOTES
1st check to see if patient has seen the results.  If not then  CALL patient with results and Document verification.  Schedule follow-up if needed.  [phone]  Ultrasound of the arteries bilateral lower extremities reviewed by radiology.  There is abnormalities noted which could indicate significant blockage due to peripheral arterial disease.  I recommend further evaluation and treatment by vascular surgeon.  Referral has been placed.  Please see them soon.

## 2024-05-22 DIAGNOSIS — I73.9 PAD (PERIPHERAL ARTERY DISEASE): Primary | ICD-10-CM

## 2024-05-27 ENCOUNTER — HOSPITAL ENCOUNTER (OUTPATIENT)
Dept: PULMONOLOGY | Facility: HOSPITAL | Age: 79
Discharge: HOME | End: 2024-05-27
Payer: MEDICARE

## 2024-05-27 ENCOUNTER — EXTERNAL HOME HEALTH (OUTPATIENT)
Dept: HOME HEALTH SERVICES | Facility: HOSPITAL | Age: 79
End: 2024-05-27
Payer: MEDICARE

## 2024-05-27 DIAGNOSIS — Z95.0 CARDIAC PACEMAKER IN SITU: ICD-10-CM

## 2024-05-27 DIAGNOSIS — G30.9 MODERATE MAJOR NEUROCOGNITIVE DISORDER DUE TO ALZHEIMER'S DISEASE WITH BEHAVIORAL DISTURBANCE: ICD-10-CM

## 2024-05-27 DIAGNOSIS — F02.B18 MODERATE MAJOR NEUROCOGNITIVE DISORDER DUE TO ALZHEIMER'S DISEASE WITH BEHAVIORAL DISTURBANCE: ICD-10-CM

## 2024-05-27 DIAGNOSIS — I67.89 ISCHEMIC ENCEPHALOPATHY: ICD-10-CM

## 2024-05-27 DIAGNOSIS — I25.118 CORONARY ARTERY DISEASE INVOLVING NATIVE CORONARY ARTERY OF NATIVE HEART WITH OTHER FORM OF ANGINA PECTORIS: Chronic | ICD-10-CM

## 2024-05-27 DIAGNOSIS — Z86.73 HISTORY OF CVA (CEREBROVASCULAR ACCIDENT): ICD-10-CM

## 2024-05-27 DIAGNOSIS — I69.354 HEMIPLEGIA AND HEMIPARESIS FOLLOWING CEREBRAL INFARCTION AFFECTING LEFT NON-DOMINANT SIDE: ICD-10-CM

## 2024-05-27 DIAGNOSIS — G93.41 METABOLIC ENCEPHALOPATHY: ICD-10-CM

## 2024-05-27 DIAGNOSIS — R41.82 ALTERED MENTAL STATUS, UNSPECIFIED ALTERED MENTAL STATUS TYPE: ICD-10-CM

## 2024-05-27 PROCEDURE — 95819 EEG AWAKE AND ASLEEP: CPT

## 2024-05-27 PROCEDURE — 95819 EEG AWAKE AND ASLEEP: CPT | Mod: 26,,, | Performed by: PSYCHIATRY & NEUROLOGY

## 2024-05-27 NOTE — PROCEDURES
DATE EEG PERFORMED:  2024.      DATE EEG INTERPRETED:  2024.                   DURATION OF EE MINUTES.        LEVEL OF CONSCIOUSENESS    Awake and Sleep.         EEG BACKGROUND    There is no posterior dominant basic alpha rhythm.        EEG CLASSIFICATION      Continuous Slowing, Generalized, Theta-Delta.      IMPRESSION      The EEG is suggestive of diffuse encephalopathy.      There are no epileptiform discharges or lateralizing signs. No typical events were recorded. There is no electrographic evidence of seizure.There is no electrographic evidence of status epilepticus.         PLEASE NOTE THAT A NON-EPILEPTIFORM EEG DOES NOT RULE OUT EPILEPSY.        MARY STRONG MD, FAAN    Diplomate, American Board of Psychiatry and Neurology    Diplomate, American Board of Clinical Neurophysiology

## 2024-06-10 ENCOUNTER — HOSPITAL ENCOUNTER (OUTPATIENT)
Dept: RADIOLOGY | Facility: HOSPITAL | Age: 79
Discharge: HOME | End: 2024-06-10
Attending: NURSE PRACTITIONER
Payer: MEDICARE

## 2024-06-10 ENCOUNTER — INITIAL CONSULT (OUTPATIENT)
Dept: VASCULAR SURGERY | Facility: CLINIC | Age: 79
End: 2024-06-10
Payer: MEDICARE

## 2024-06-10 ENCOUNTER — HOSPITAL ENCOUNTER (OUTPATIENT)
Dept: VASCULAR SURGERY | Facility: HOSPITAL | Age: 79
Discharge: HOME | End: 2024-06-10
Attending: NURSE PRACTITIONER
Payer: MEDICARE

## 2024-06-10 VITALS — DIASTOLIC BLOOD PRESSURE: 77 MMHG | HEART RATE: 79 BPM | SYSTOLIC BLOOD PRESSURE: 120 MMHG

## 2024-06-10 DIAGNOSIS — Z86.73 HISTORY OF CVA (CEREBROVASCULAR ACCIDENT): ICD-10-CM

## 2024-06-10 DIAGNOSIS — F02.B18 MODERATE MAJOR NEUROCOGNITIVE DISORDER DUE TO ALZHEIMER'S DISEASE WITH BEHAVIORAL DISTURBANCE: ICD-10-CM

## 2024-06-10 DIAGNOSIS — I63.89 OTHER CEREBRAL INFARCTION: ICD-10-CM

## 2024-06-10 DIAGNOSIS — I15.2 HYPERTENSION ASSOCIATED WITH DIABETES: Chronic | ICD-10-CM

## 2024-06-10 DIAGNOSIS — R29.818 OTHER SYMPTOMS AND SIGNS INVOLVING THE NERVOUS SYSTEM: ICD-10-CM

## 2024-06-10 DIAGNOSIS — I73.9 PAD (PERIPHERAL ARTERY DISEASE): ICD-10-CM

## 2024-06-10 DIAGNOSIS — R41.3 OTHER AMNESIA: ICD-10-CM

## 2024-06-10 DIAGNOSIS — G30.9 MODERATE MAJOR NEUROCOGNITIVE DISORDER DUE TO ALZHEIMER'S DISEASE WITH BEHAVIORAL DISTURBANCE: ICD-10-CM

## 2024-06-10 DIAGNOSIS — I73.9 PAD (PERIPHERAL ARTERY DISEASE): Primary | ICD-10-CM

## 2024-06-10 DIAGNOSIS — F03.90 DEMENTIA WITHOUT BEHAVIORAL DISTURBANCE: ICD-10-CM

## 2024-06-10 DIAGNOSIS — E11.59 HYPERTENSION ASSOCIATED WITH DIABETES: Chronic | ICD-10-CM

## 2024-06-10 LAB
LEFT ABI: 1.19
LEFT ARM BP: 139 MMHG
LEFT DORSALIS PEDIS: 170 MMHG
LEFT POSTERIOR TIBIAL: 164 MMHG
LEFT TBI: 0.88
LEFT TOE PRESSURE: 126 MMHG
RIGHT ABI: 1.2
RIGHT ARM BP: 143 MMHG
RIGHT DORSALIS PEDIS: 172 MMHG
RIGHT POSTERIOR TIBIAL: 165 MMHG
RIGHT TBI: 0.79
RIGHT TOE PRESSURE: 113 MMHG

## 2024-06-10 PROCEDURE — 99204 OFFICE O/P NEW MOD 45 MIN: CPT | Mod: S$GLB,,, | Performed by: STUDENT IN AN ORGANIZED HEALTH CARE EDUCATION/TRAINING PROGRAM

## 2024-06-10 PROCEDURE — 93922 UPR/L XTREMITY ART 2 LEVELS: CPT | Mod: 26,,, | Performed by: STUDENT IN AN ORGANIZED HEALTH CARE EDUCATION/TRAINING PROGRAM

## 2024-06-10 PROCEDURE — 93922 UPR/L XTREMITY ART 2 LEVELS: CPT

## 2024-06-10 PROCEDURE — 70450 CT HEAD/BRAIN W/O DYE: CPT | Mod: TC

## 2024-06-10 PROCEDURE — 70450 CT HEAD/BRAIN W/O DYE: CPT | Mod: 26,,, | Performed by: RADIOLOGY

## 2024-06-10 PROCEDURE — 99999 PR PBB SHADOW E&M-EST. PATIENT-LVL III: CPT | Mod: PBBFAC,,, | Performed by: STUDENT IN AN ORGANIZED HEALTH CARE EDUCATION/TRAINING PROGRAM

## 2024-06-10 NOTE — PROGRESS NOTES
CT HEAD WO:    06-    1.  Negative for acute intracranial process. Negative for hemorrhage, or skull fracture.    2.  Cerebral atrophy noted.  Intracranial atherosclerotic disease noted.  Small vessel ischemic changes noted.    3.  Paranasal sinus disease in distribution described above, with findings concerning for acute sinusitis of the sphenoid sinuses.  Clinical correlation is advised.    4.  Stable findings as noted above.

## 2024-06-10 NOTE — PROGRESS NOTES
Rishabh Lee    OFFICE NOTE    DATE OF VISIT: 6/10/2024  PATIENT NAME: Marianne Cali  : 1945  MRN: [id number]  PRIMARY CARE PHYSICIAN: Antoni Patel MD  CARDIOLOGIST:   REFERRING PROVIDER: Antoni Patel MD    CHIEF COMPLAINT   Chief Complaint   Patient presents with    Consult     Consult for PAD.       HISTORY OF PRESENT ILLNESS:  Marianne Cali is a 78 y.o. female who presents to clinic today with bilateral leg pain. The pain is described as shooting sharp pain extending from her knee and moving cephalad. This happens randomly is not associated with increased activity. She denies claudication or rest pain. Has been going on for about 3 years now. She has seen neurology recently for memory loss issues. She does not report any tissue loss or ulcers on her feet    ALLERGIES:  Review of patient's allergies indicates:   Allergen Reactions    Morphine Anxiety and Other (See Comments)     Dizziness       PAST MEDICAL HISTORY:  Past Medical History:   Diagnosis Date    Allergy     Cataract     Dementia     Depression     Diabetes mellitus, type 2     Heart murmur     Hypertension     Stroke        PAST SURGICAL HISTORY:  Past Surgical History:   Procedure Laterality Date    EYE SURGERY      HIP REPLACEMENT ARTHROPLASTY      HYSTERECTOMY         SOCIAL HISTORY:   Social History     Tobacco Use    Smoking status: Never    Smokeless tobacco: Never   Substance Use Topics    Alcohol use: Never    Drug use: Never       FAMILY HISTORY:  Family History   Problem Relation Name Age of Onset    Cancer Mother      Hypertension Mother      Cancer Sister         REVIEW OF SYSTEMS:  ROS  10 pt ros otherwise negative    PHYSICAL EXAM:  Vitals:    06/10/24 1337   BP: 120/77   Pulse: 79      Physical Exam      Vss  Gen nad, alert  Frail appearing  Cv rrr  Lung ctab  Palpable pedal pulses b/l    VASCULAR LAB STUDIES:  Gladys 1.2 right with toe pressure 113mmhg  Left side gladys is 1.1 toe pressure 126mmhg    ASSESSMENT AND  PLAN:  Hypertension associated with diabetes  Medical management    PAD (peripheral artery disease)  She has excellent palpable pedal pulses bilaterally. Her ERICA are very reassuring. I believe her pain in legs might be a neurological issue I have sent her a referral to neurologist again. She does not need to see me unless she develops a vascular issue      Marianne was seen today for consult.    Diagnoses and all orders for this visit:    PAD (peripheral artery disease)  -     Ambulatory referral/consult to Vascular Surgery    Hypertension associated with diabetes        No follow-ups on file.        Rishabh Lee  [facility]  Vascular Surgery  [phone] (Clinic Number)

## 2024-06-10 NOTE — ASSESSMENT & PLAN NOTE
She has excellent palpable pedal pulses bilaterally. Her ERICA are very reassuring. I believe her pain in legs might be a neurological issue I have sent her a referral to neurologist again. She does not need to see me unless she develops a vascular issue

## 2024-06-13 ENCOUNTER — OFFICE VISIT (OUTPATIENT)
Dept: DIABETES | Facility: CLINIC | Age: 79
End: 2024-06-13
Payer: MEDICARE

## 2024-06-13 VITALS
DIASTOLIC BLOOD PRESSURE: 72 MMHG | SYSTOLIC BLOOD PRESSURE: 115 MMHG | HEIGHT: 69 IN | HEART RATE: 76 BPM | BODY MASS INDEX: 18.61 KG/M2 | WEIGHT: 125.63 LBS

## 2024-06-13 DIAGNOSIS — N18.31 TYPE 2 DIABETES MELLITUS WITH STAGE 3A CHRONIC KIDNEY DISEASE, WITHOUT LONG-TERM CURRENT USE OF INSULIN: Primary | ICD-10-CM

## 2024-06-13 DIAGNOSIS — E11.22 TYPE 2 DIABETES MELLITUS WITH STAGE 3A CHRONIC KIDNEY DISEASE, WITHOUT LONG-TERM CURRENT USE OF INSULIN: Primary | ICD-10-CM

## 2024-06-13 LAB — GLUCOSE SERPL-MCNC: 202 MG/DL (ref 70–110)

## 2024-06-13 PROCEDURE — 99214 OFFICE O/P EST MOD 30 MIN: CPT | Mod: S$GLB,,, | Performed by: NURSE PRACTITIONER

## 2024-06-13 PROCEDURE — 1101F PT FALLS ASSESS-DOCD LE1/YR: CPT | Mod: CPTII,S$GLB,, | Performed by: NURSE PRACTITIONER

## 2024-06-13 PROCEDURE — 3078F DIAST BP <80 MM HG: CPT | Mod: CPTII,S$GLB,, | Performed by: NURSE PRACTITIONER

## 2024-06-13 PROCEDURE — 3288F FALL RISK ASSESSMENT DOCD: CPT | Mod: CPTII,S$GLB,, | Performed by: NURSE PRACTITIONER

## 2024-06-13 PROCEDURE — 1126F AMNT PAIN NOTED NONE PRSNT: CPT | Mod: CPTII,S$GLB,, | Performed by: NURSE PRACTITIONER

## 2024-06-13 PROCEDURE — 1159F MED LIST DOCD IN RCRD: CPT | Mod: CPTII,S$GLB,, | Performed by: NURSE PRACTITIONER

## 2024-06-13 PROCEDURE — 82962 GLUCOSE BLOOD TEST: CPT | Mod: S$GLB,,, | Performed by: NURSE PRACTITIONER

## 2024-06-13 PROCEDURE — 3074F SYST BP LT 130 MM HG: CPT | Mod: CPTII,S$GLB,, | Performed by: NURSE PRACTITIONER

## 2024-06-13 PROCEDURE — 99999 PR PBB SHADOW E&M-EST. PATIENT-LVL IV: CPT | Mod: PBBFAC,,, | Performed by: NURSE PRACTITIONER

## 2024-06-13 NOTE — PROGRESS NOTES
Marianne Cali is a 78 y.o. female who  has a past medical history of Allergy, Cataract, Dementia, Depression, Diabetes mellitus, type 2, Heart murmur, Hypertension, and Stroke. and presents for a follow up evaluation of Type 2 diabetes mellitus.     CHIEF COMPLAINT: Diabetes Consultation    PCP: Antoni Patel MD     Initial visit with me - 2/29/2024    The patient was initially diagnosed with diabetes 6 years ago.      Previous failed treatments include:  None.     Social Documentation:  Patient lives in [place] with .   Occupation: retired.   Exercise: limited due to health problems.      Diabetes related complications:   cerebrovascular disease.   denies Pancreatitis  denies Gastroparesis  denies DKA  denies Hx/family Hx of MEN2/MTC  reports Frequent UTIs/yeast infections     Cardiovascular Risk Factors: advanced age (>55 for men, >65 for women), dyslipidemia, hypertension, and sedentary lifestyle.     Diabetes Medications               insulin (LANTUS SOLOSTAR U-100 INSULIN) glargine 100 units/mL SubQ pen Inject 5 Units into the skin every evening.    JANUVIA 50 mg Tab Take 1 tablet (50 mg total) by mouth once daily.    metFORMIN (GLUCOPHAGE) 500 MG tablet Take 1 tablet (500 mg total) by mouth 2 (two) times daily with meals.     Current monitoring regimen:   Has G7 . - forgot at home.     Patient currently taking insulin or sulfonylurea?  Yes. Emergency Glucagon Prescription current? no  Recent hypoglycemic episodes: No.     Patient compliant with glucose checks and medication administration? Yes    DIABETES MANAGEMENT STATUS  Statin: Taking  ACE/ARB: Taking  Screening or Prevention Patient's value Goal Complete/Controlled?   HgA1C Testing and Control   Lab Results   Component Value Date    HGBA1C 8.5 (H) 04/11/2024      Annually/Less than 8% No   Lipid profile : 01/19/2024 Annually Yes   LDL control Lab Results   Component Value Date    LDLCALC 99.0 01/19/2024    Annually/Less than 100 mg/dl   Yes   Nephropathy screening Lab Results   Component Value Date    LABMICR 57.0 04/11/2024     Lab Results   Component Value Date    PROTEINUA Trace (A) 05/20/2024     Lab Results   Component Value Date    UTPCR 0.12 05/20/2024      Annually Yes   Blood pressure BP Readings from Last 1 Encounters:   06/13/24 115/72    Less than 140/90 Yes   Dilated retinal exam Most Recent Eye Exam Date: Not Found Annually No   Foot exam   : 05/13/2024 Annually No   Patient's medications, allergies, surgical, social and family histories were reviewed and updated as appropriate.     Review of Systems   Constitutional:  Negative for weight loss.   Eyes:  Negative for blurred vision and double vision.   Cardiovascular:  Negative for chest pain.   Gastrointestinal:  Negative for nausea and vomiting.   Genitourinary:  Negative for frequency.   Musculoskeletal:  Negative for falls.   Neurological:  Negative for dizziness and weakness.   Endo/Heme/Allergies:  Negative for polydipsia.   Psychiatric/Behavioral:  Negative for depression.    All other systems reviewed and are negative.       Physical Exam  Constitutional:       Appearance: Normal appearance.   HENT:      Head: Normocephalic and atraumatic.   Eyes:      Extraocular Movements: Extraocular movements intact.      Pupils: Pupils are equal, round, and reactive to light.   Cardiovascular:      Rate and Rhythm: Normal rate and regular rhythm.      Pulses:           Dorsalis pedis pulses are 1+ on the right side and 1+ on the left side.        Posterior tibial pulses are 1+ on the right side and 1+ on the left side.      Heart sounds: Normal heart sounds.   Pulmonary:      Effort: Pulmonary effort is normal. No respiratory distress.      Breath sounds: Normal breath sounds.   Musculoskeletal:         General: No swelling.      Cervical back: Normal range of motion.   Feet:      Right foot:      Protective Sensation: 8 sites tested.  8 sites sensed.      Skin integrity: Skin integrity  "normal.      Toenail Condition: Right toenails are normal.      Left foot:      Protective Sensation: 8 sites tested.  8 sites sensed.      Skin integrity: Skin integrity normal.      Toenail Condition: Left toenails are normal.   Skin:     General: Skin is warm and dry.   Neurological:      Mental Status: She is alert and oriented to person, place, and time.   Psychiatric:         Mood and Affect: Mood normal.         Behavior: Behavior normal.        Blood pressure 115/72, pulse 76, height 5' 9" (1.753 m), weight 57 kg (125 lb 9.6 oz).  Wt Readings from Last 3 Encounters:   06/13/24 57 kg (125 lb 9.6 oz)   05/06/24 59.5 kg (131 lb 2.8 oz)   04/30/24 59.3 kg (130 lb 12.8 oz)       LAB REVIEW  Lab Results   Component Value Date     05/28/2024    K 5.7 (H) 05/28/2024     05/20/2024    CO2 23 05/28/2024    BUN 40 (H) 05/28/2024    CREATININE 1.72 (H) 05/28/2024    CALCIUM 9.9 05/28/2024    ANIONGAP 13 05/28/2024    EGFRNORACEVR 33.0 (A) 05/20/2024     Lab Results   Component Value Date    CPEPTIDE 2.22 04/01/2024    GLUTAMICACID 0.00 04/01/2024     Hemoglobin A1C   Date Value Ref Range Status   04/11/2024 8.5 (H) 4.0 - 5.6 % Final     Comment:     ADA Screening Guidelines:  5.7-6.4%  Consistent with prediabetes  >or=6.5%  Consistent with diabetes    High levels of fetal hemoglobin interfere with the HbA1C  assay. Heterozygous hemoglobin variants (HbS, HgC, etc)do  not significantly interfere with this assay.   However, presence of multiple variants may affect accuracy.     01/19/2024 11.5 (H) 4.0 - 5.6 % Final     Comment:     ADA Screening Guidelines:  5.7-6.4%  Consistent with prediabetes  >or=6.5%  Consistent with diabetes    High levels of fetal hemoglobin interfere with the HbA1C  assay. Heterozygous hemoglobin variants (HbS, HgC, etc)do  not significantly interfere with this assay.   However, presence of multiple variants may affect accuracy.     01/13/2024 11.6 (H) 4.6 - 6.2 % Final   08/21/2023 8.1 " (H) 4.0 - 5.6 % Final     Comment:     ADA Screening Guidelines:  5.7-6.4%  Consistent with prediabetes  >or=6.5%  Consistent with diabetes    High levels of fetal hemoglobin interfere with the HbA1C  assay. Heterozygous hemoglobin variants (HbS, HgC, etc)do  not significantly interfere with this assay.   However, presence of multiple variants may affect accuracy.         Lab Results   Component Value Date    POCGLU 202 (A) 06/13/2024       ASSESSMENT    ICD-10-CM ICD-9-CM   1. Type 2 diabetes mellitus with stage 3a chronic kidney disease, without long-term current use of insulin  E11.22 250.40    N18.31 585.3       PLAN  Diagnoses and all orders for this visit:    Type 2 diabetes mellitus with stage 3a chronic kidney disease, without long-term current use of insulin  -     POCT Glucose, Hand-Held Device        Reviewed pathophysiology of diabetes, complications related to the disease, importance of annual dilated eye exam and daily foot examination. Explained MOA, SE, dosage of medications. Written instructions given and reviewed with patient and patient verbalizes understanding.      4/11/2024 - Has G7 . Order send to [facility] for sensors due to higher copay at pharmacy. Per [facility] they will be shipped today. Patient and  are poor historians and we would benefit from another family member being present during clinic visits. Their nephew has been helping with her CGM and insulin and I have requested if possible for him to come to her diabetes visit. Will check A1c today.     6/13/2024 - no complaints today, forgot dexcom  at home, wearing sensor. Family member is present with them today.     PATIENT INSTRUCTIONS      I do request that your nephew or daughter attend visits from here on to assist with care.     Continue Lantus 5 units subcutaneously daily.   OK to hold dose if glucose less than 120.      Continue Metformin 500 mg by mouth twice daily with meals. - GFR 43     Continue Januvia 50  mg by mouth daily.      Dexcom G7 CGM.   Blood Sugar Goals:       Fastin-130.       1-2 hours after a meal: Less than 180.         Follow up in about 3 months (around 2024) for Dexcom.    Portions of this note were prepared with [employer] Naturally Speaking voice recognition transcription software. Grammatical errors, including garbled syntax, mangle pronouns, and other bizarre constructions may be attributed to that software system.

## 2024-06-13 NOTE — PATIENT INSTRUCTIONS
PATIENT INSTRUCTIONS      I do request that your nephew or daughter attend visits from here on to assist with care.     Continue Lantus 5 units subcutaneously daily.   OK to hold dose if glucose less than 120.      Continue Metformin 500 mg by mouth twice daily with meals. - GFR 43     Continue Januvia 50 mg by mouth daily.      Dexcom G7 CGM.   Blood Sugar Goals:       Fastin-130.       1-2 hours after a meal: Less than 180.

## 2024-06-19 ENCOUNTER — DOCUMENT SCAN (OUTPATIENT)
Dept: HOME HEALTH SERVICES | Facility: HOSPITAL | Age: 79
End: 2024-06-19
Payer: MEDICARE

## 2024-06-27 ENCOUNTER — TELEPHONE (OUTPATIENT)
Dept: FAMILY MEDICINE | Facility: CLINIC | Age: 79
End: 2024-06-27
Payer: MEDICARE

## 2024-06-27 NOTE — TELEPHONE ENCOUNTER
----- Message from Mary Nunez sent at 6/27/2024 12:58 PM CDT -----  Regarding: concerns  Name of who is calling:   Qiana /Vincent [facility]      What is the request in detail: Requesting a call back / pt burning when urinating/ need verbal order for urinalysis / please call      Can the clinic reply by [facility]:      What number to call back if not [facility]:[phone]

## 2024-06-28 ENCOUNTER — LAB VISIT (OUTPATIENT)
Dept: LAB | Facility: HOSPITAL | Age: 79
End: 2024-06-28
Attending: FAMILY MEDICINE
Payer: MEDICARE

## 2024-06-28 DIAGNOSIS — N39.0 URINARY TRACT INFECTION, SITE NOT SPECIFIED: ICD-10-CM

## 2024-06-28 LAB
BACTERIA #/AREA URNS AUTO: ABNORMAL /HPF
BILIRUB UR QL STRIP: NEGATIVE
CLARITY UR REFRACT.AUTO: ABNORMAL
COLOR UR AUTO: YELLOW
GLUCOSE UR QL STRIP: NEGATIVE
HGB UR QL STRIP: NEGATIVE
HYALINE CASTS UR QL AUTO: 4 /LPF
KETONES UR QL STRIP: NEGATIVE
LEUKOCYTE ESTERASE UR QL STRIP: ABNORMAL
MICROSCOPIC COMMENT: ABNORMAL
NITRITE UR QL STRIP: NEGATIVE
NON-SQ EPI CELLS #/AREA URNS AUTO: 1 /HPF
PH UR STRIP: 7 [PH] (ref 5–8)
PROT UR QL STRIP: NEGATIVE
RBC #/AREA URNS AUTO: 4 /HPF (ref 0–4)
SP GR UR STRIP: 1.01 (ref 1–1.03)
SQUAMOUS #/AREA URNS AUTO: 4 /HPF
URN SPEC COLLECT METH UR: ABNORMAL
WBC #/AREA URNS AUTO: 100 /HPF (ref 0–5)
WBC CLUMPS UR QL AUTO: ABNORMAL
YEAST UR QL AUTO: ABNORMAL

## 2024-06-28 PROCEDURE — 81001 URINALYSIS AUTO W/SCOPE: CPT | Performed by: FAMILY MEDICINE

## 2024-06-28 PROCEDURE — 87086 URINE CULTURE/COLONY COUNT: CPT | Performed by: FAMILY MEDICINE

## 2024-06-29 DIAGNOSIS — N39.0 URINARY TRACT INFECTION WITHOUT HEMATURIA, SITE UNSPECIFIED: Primary | ICD-10-CM

## 2024-06-29 LAB
BACTERIA UR CULT: NORMAL
BACTERIA UR CULT: NORMAL

## 2024-06-29 RX ORDER — CIPROFLOXACIN 500 MG/1
500 TABLET ORAL 2 TIMES DAILY
Qty: 14 TABLET | Refills: 0 | Status: SHIPPED | OUTPATIENT
Start: 2024-06-29 | End: 2024-07-06

## 2024-06-29 NOTE — PROGRESS NOTES
Please call to make sure patient get the results.    [phone], Your urinalysis is abnormal.  Showing infection.  I have sent antibiotic ciprofloxacin to the pharmacy.  I am sending the urine off for culture for further evaluation of the type of infection.  Further results to follow.  Please  the antibiotic and start taking it as prescribed.  Follow-up sooner if no improvement.  ER precautions for severe symptoms.

## 2024-07-01 NOTE — PROGRESS NOTES
Called and spoke with pt daughter Chavelle with results. Verbalized understanding and is going to get rx for pt

## 2024-07-08 DIAGNOSIS — N18.31 TYPE 2 DIABETES MELLITUS WITH STAGE 3A CHRONIC KIDNEY DISEASE, WITHOUT LONG-TERM CURRENT USE OF INSULIN: ICD-10-CM

## 2024-07-08 DIAGNOSIS — E11.22 TYPE 2 DIABETES MELLITUS WITH STAGE 3A CHRONIC KIDNEY DISEASE, WITHOUT LONG-TERM CURRENT USE OF INSULIN: ICD-10-CM

## 2024-07-08 RX ORDER — BLOOD-GLUCOSE,RECEIVER,CONT
1 EACH MISCELLANEOUS DAILY
Qty: 1 EACH | Refills: 0 | Status: SHIPPED | OUTPATIENT
Start: 2024-07-08 | End: 2025-07-08

## 2024-07-08 NOTE — TELEPHONE ENCOUNTER
----- Message from Rowena Bosch sent at 7/8/2024  8:51 AM CDT -----  Contact: daughter  ..Type:  RX Refill Request    Who Called: .Marianne Cali  Refill or New Rx:Refill   RX Name and Strength:blood-glucose sensor (DEXCOM G7 SENSOR) Philomena  How is the patient currently taking it? (ex. 1XDay):  Is this a 30 day or 90 day RX:  Preferred Pharmacy with phone number:.  [facility] [place] - [id number] [facility] [address]  Phone: [phone] Fax: [phone]  Local or Mail Order:local   Ordering Provider:Amanda   Would the patient rather a call back or a response via [facility]? Call back   Best Call Back Number:.[phone]   Additional Information:

## 2024-07-08 NOTE — TELEPHONE ENCOUNTER
Care Due:                  Date            Visit Type   Department     Provider  --------------------------------------------------------------------------------                                EP -                              PRIMARY      [facility]  Last Visit: 04-      CARE ([facility])   MEDICINE       Antoni Patel  Next Visit: None Scheduled  None         None Found                                                            Last  Test          Frequency    Reason                     Performed    Due Date  --------------------------------------------------------------------------------    Valproic      12 months..  divalproex...............  Not Found    Overdue  Acid (serum)    [employer] Embedded Care Due Messages. Reference number: [id number].   7/08/2024 9:01:19 AM CDT

## 2024-07-08 NOTE — TELEPHONE ENCOUNTER
Courtesy refill of G7  given for coverage of EDNA Gilbert.     Michelle Mallory PA-C  Diabetes Management

## 2024-07-23 ENCOUNTER — DOCUMENT SCAN (OUTPATIENT)
Dept: HOME HEALTH SERVICES | Facility: HOSPITAL | Age: 79
End: 2024-07-23
Payer: MEDICARE

## 2024-07-30 ENCOUNTER — LAB VISIT (OUTPATIENT)
Dept: LAB | Facility: HOSPITAL | Age: 79
End: 2024-07-30
Payer: MEDICARE

## 2024-07-30 DIAGNOSIS — E11.59 HYPERTENSION ASSOCIATED WITH DIABETES: ICD-10-CM

## 2024-07-30 DIAGNOSIS — I15.2 HYPERTENSION ASSOCIATED WITH DIABETES: ICD-10-CM

## 2024-07-30 DIAGNOSIS — F01.B3 MODERATE VASCULAR DEMENTIA WITH MOOD DISTURBANCE: ICD-10-CM

## 2024-07-30 DIAGNOSIS — Z79.899 ENCOUNTER FOR LONG-TERM (CURRENT) USE OF MEDICATIONS: ICD-10-CM

## 2024-07-30 DIAGNOSIS — Z00.00 ENCOUNTER FOR MEDICAL EXAMINATION TO ESTABLISH CARE: ICD-10-CM

## 2024-07-30 DIAGNOSIS — E11.69 HYPERLIPIDEMIA ASSOCIATED WITH TYPE 2 DIABETES MELLITUS: ICD-10-CM

## 2024-07-30 DIAGNOSIS — E11.65 TYPE 2 DIABETES MELLITUS WITH HYPERGLYCEMIA, WITHOUT LONG-TERM CURRENT USE OF INSULIN: ICD-10-CM

## 2024-07-30 DIAGNOSIS — E78.5 HYPERLIPIDEMIA ASSOCIATED WITH TYPE 2 DIABETES MELLITUS: ICD-10-CM

## 2024-07-30 LAB
ALBUMIN SERPL BCP-MCNC: 3.2 G/DL (ref 3.5–5.2)
ALP SERPL-CCNC: 52 U/L (ref 55–135)
ALT SERPL W/O P-5'-P-CCNC: 8 U/L (ref 10–44)
ANION GAP SERPL CALC-SCNC: 8 MMOL/L (ref 8–16)
AST SERPL-CCNC: 15 U/L (ref 10–40)
BILIRUB SERPL-MCNC: 0.6 MG/DL (ref 0.1–1)
BUN SERPL-MCNC: 23 MG/DL (ref 8–23)
CALCIUM SERPL-MCNC: 9.3 MG/DL (ref 8.7–10.5)
CHLORIDE SERPL-SCNC: 108 MMOL/L (ref 95–110)
CHOLEST SERPL-MCNC: 119 MG/DL (ref 120–199)
CHOLEST/HDLC SERPL: 4.4 {RATIO} (ref 2–5)
CO2 SERPL-SCNC: 30 MMOL/L (ref 23–29)
CREAT SERPL-MCNC: 0.9 MG/DL (ref 0.5–1.4)
EST. GFR  (NO RACE VARIABLE): >60 ML/MIN/1.73 M^2
ESTIMATED AVG GLUCOSE: 166 MG/DL (ref 68–131)
GLUCOSE SERPL-MCNC: 107 MG/DL (ref 70–110)
HBA1C MFR BLD: 7.4 % (ref 4–5.6)
HDLC SERPL-MCNC: 27 MG/DL (ref 40–75)
HDLC SERPL: 22.7 % (ref 20–50)
HGB BLD-MCNC: 9.6 G/DL (ref 12–16)
LDLC SERPL CALC-MCNC: 75.4 MG/DL (ref 63–159)
NONHDLC SERPL-MCNC: 92 MG/DL
POTASSIUM SERPL-SCNC: 3.4 MMOL/L (ref 3.5–5.1)
PROT SERPL-MCNC: 7.9 G/DL (ref 6–8.4)
SODIUM SERPL-SCNC: 146 MMOL/L (ref 136–145)
TRIGL SERPL-MCNC: 83 MG/DL (ref 30–150)
VALPROATE SERPL-MCNC: 30.5 UG/ML (ref 50–100)

## 2024-07-30 PROCEDURE — 85018 HEMOGLOBIN: CPT | Performed by: FAMILY MEDICINE

## 2024-07-30 PROCEDURE — 80164 ASSAY DIPROPYLACETIC ACD TOT: CPT | Performed by: FAMILY MEDICINE

## 2024-07-30 PROCEDURE — 36415 COLL VENOUS BLD VENIPUNCTURE: CPT | Mod: PO | Performed by: FAMILY MEDICINE

## 2024-07-30 PROCEDURE — 80053 COMPREHEN METABOLIC PANEL: CPT | Performed by: FAMILY MEDICINE

## 2024-07-30 PROCEDURE — 80061 LIPID PANEL: CPT | Performed by: FAMILY MEDICINE

## 2024-07-30 PROCEDURE — 83036 HEMOGLOBIN GLYCOSYLATED A1C: CPT | Performed by: FAMILY MEDICINE

## 2024-08-02 ENCOUNTER — TELEPHONE (OUTPATIENT)
Dept: PODIATRY | Facility: CLINIC | Age: 79
End: 2024-08-02
Payer: MEDICARE

## 2024-08-02 NOTE — TELEPHONE ENCOUNTER
Spoke with patient daughter about elevating feet and using compression stocks the with swelling. If anything else happens just give us a call back      ----- Message from Angeline Ibrahim sent at 8/2/2024  1:00 PM CDT -----  Name of Who is Calling: Shireen(daughter)     TYREL DICK [[id number]]     What is the request in detail: Pt will like to have a prescription set over for fluid pill for both of her feet they are swollen .        Can the clinic reply by [facility]:yes         What Number to Call Back if not in [facility]: Telephone Information:  Mobile          [phone]        [facility] [place] - [address]  [address] [facility] [address]  Phone: [phone] Fax: [phone]

## 2024-08-03 DIAGNOSIS — D53.9 NUTRITIONAL ANEMIA: Primary | ICD-10-CM

## 2024-08-04 NOTE — PROGRESS NOTES
Make follow-up lab appointment per recommendation below.  Check to see if patient has seen the results through my chart.  If not then,  #CALL THE PATIENT# to discuss results/see if they have questions and document verification of contact. Make F/U appt if needed. [phone]    #My interpretation that was sent to them through [employer]:  Marianne, I have reviewed your recent blood work.     Your hemoglobin is lower than previous consistent with worsening anemia.  Check CBC with differential including iron, ferritin, folate and B12 level.  Follow-up after for results.  Your metabolic panel which shows your glucose, kidney function, electrolytes, and liver function is borderline abnormal.  Sodium level is slightly elevated.  Potassium is slightly decreased.  Add potassium supplement to medication regimen.  Kidney function has improved.  Valproic acid level is nontoxic.  Continue current dosage.  Follow-up with neurology.  Your cholesterol is improved.  Up the great work!  Your hemoglobin A1c is improved.  Keep up the great work!  This test is gold standard screening test for diabetes.  It is a measures 3 months of your average blood sugar.  =========================  Also please address any outstanding health maintenance that may be due: Eye Exam Never done  RSV Vaccine (Age 60+ and Pregnant patients)(1 - 1-dose 60+ series) Never done  DEXA Scan due on 08/13/2024

## 2024-08-06 ENCOUNTER — LAB VISIT (OUTPATIENT)
Dept: LAB | Facility: HOSPITAL | Age: 79
End: 2024-08-06
Attending: FAMILY MEDICINE
Payer: MEDICARE

## 2024-08-06 DIAGNOSIS — D53.9 NUTRITIONAL ANEMIA: ICD-10-CM

## 2024-08-06 LAB
BASOPHILS # BLD AUTO: 0.02 K/UL (ref 0–0.2)
BASOPHILS NFR BLD: 0.3 % (ref 0–1.9)
DIFFERENTIAL METHOD BLD: ABNORMAL
EOSINOPHIL # BLD AUTO: 0.1 K/UL (ref 0–0.5)
EOSINOPHIL NFR BLD: 1.3 % (ref 0–8)
ERYTHROCYTE [DISTWIDTH] IN BLOOD BY AUTOMATED COUNT: 16.2 % (ref 11.5–14.5)
FERRITIN SERPL-MCNC: 579 NG/ML (ref 20–300)
FOLATE SERPL-MCNC: 5.5 NG/ML (ref 4–24)
HCT VFR BLD AUTO: 26.8 % (ref 37–48.5)
HGB BLD-MCNC: 8.4 G/DL (ref 12–16)
IMM GRANULOCYTES # BLD AUTO: 0.02 K/UL (ref 0–0.04)
IMM GRANULOCYTES NFR BLD AUTO: 0.3 % (ref 0–0.5)
IRON SERPL-MCNC: 54 UG/DL (ref 30–160)
LYMPHOCYTES # BLD AUTO: 2 K/UL (ref 1–4.8)
LYMPHOCYTES NFR BLD: 33.3 % (ref 18–48)
MCH RBC QN AUTO: 27 PG (ref 27–31)
MCHC RBC AUTO-ENTMCNC: 31.3 G/DL (ref 32–36)
MCV RBC AUTO: 86 FL (ref 82–98)
MONOCYTES # BLD AUTO: 0.6 K/UL (ref 0.3–1)
MONOCYTES NFR BLD: 9.3 % (ref 4–15)
NEUTROPHILS # BLD AUTO: 3.3 K/UL (ref 1.8–7.7)
NEUTROPHILS NFR BLD: 55.5 % (ref 38–73)
NRBC BLD-RTO: 0 /100 WBC
PLATELET # BLD AUTO: 174 K/UL (ref 150–450)
PMV BLD AUTO: 11.4 FL (ref 9.2–12.9)
RBC # BLD AUTO: 3.11 M/UL (ref 4–5.4)
SATURATED IRON: 23 % (ref 20–50)
TOTAL IRON BINDING CAPACITY: 237 UG/DL (ref 250–450)
TRANSFERRIN SERPL-MCNC: 160 MG/DL (ref 200–375)
VIT B12 SERPL-MCNC: 1112 PG/ML (ref 210–950)
WBC # BLD AUTO: 6 K/UL (ref 3.9–12.7)

## 2024-08-06 PROCEDURE — 82607 VITAMIN B-12: CPT | Performed by: FAMILY MEDICINE

## 2024-08-06 PROCEDURE — 82728 ASSAY OF FERRITIN: CPT | Performed by: FAMILY MEDICINE

## 2024-08-06 PROCEDURE — 82746 ASSAY OF FOLIC ACID SERUM: CPT | Performed by: FAMILY MEDICINE

## 2024-08-06 PROCEDURE — 85025 COMPLETE CBC W/AUTO DIFF WBC: CPT | Performed by: FAMILY MEDICINE

## 2024-08-06 PROCEDURE — 36415 COLL VENOUS BLD VENIPUNCTURE: CPT | Mod: PO | Performed by: FAMILY MEDICINE

## 2024-08-06 PROCEDURE — 83540 ASSAY OF IRON: CPT | Performed by: FAMILY MEDICINE

## 2024-08-07 DIAGNOSIS — D53.9 NUTRITIONAL ANEMIA: Primary | ICD-10-CM

## 2024-08-07 NOTE — PROGRESS NOTES
Make follow-up lab appointment per recommendation below.  Check to see if patient has seen the results through my chart.  If not then,  #CALL THE PATIENT# to discuss results/see if they have questions and document verification of contact. Make F/U appt if needed. [phone]    #My interpretation that was sent to them through [facility]:  Marianne, I have reviewed your recent blood work.     B12 level is elevated from previous.  Continue B12 supplement if helping with symptoms.  Iron levels are improved from previous.  Folate level is at lower limits of normal.  Start folic acid supplement over-the-counter.  Your complete blood count is slightly decreased from previous.  Moderate anemia is present.  I recommend referral to Hematology for further evaluation and treatment.  ER precautions for severe symptoms of anemia.    =========================  Also please address any outstanding health maintenance that may be due: Eye Exam Never done  RSV Vaccine (Age 60+ and Pregnant patients)(1 - 1-dose 60+ series) Never done  DEXA Scan due on 08/13/2024

## 2024-08-08 ENCOUNTER — OFFICE VISIT (OUTPATIENT)
Dept: RHEUMATOLOGY | Facility: CLINIC | Age: 79
End: 2024-08-08
Payer: MEDICARE

## 2024-08-08 VITALS
BODY MASS INDEX: 18.62 KG/M2 | HEIGHT: 69 IN | WEIGHT: 125.69 LBS | SYSTOLIC BLOOD PRESSURE: 128 MMHG | HEART RATE: 80 BPM | DIASTOLIC BLOOD PRESSURE: 74 MMHG

## 2024-08-08 DIAGNOSIS — N18.32 STAGE 3B CHRONIC KIDNEY DISEASE: ICD-10-CM

## 2024-08-08 DIAGNOSIS — F02.B18 MODERATE MAJOR NEUROCOGNITIVE DISORDER DUE TO ALZHEIMER'S DISEASE WITH BEHAVIORAL DISTURBANCE: ICD-10-CM

## 2024-08-08 DIAGNOSIS — Z86.73 HISTORY OF CVA (CEREBROVASCULAR ACCIDENT): ICD-10-CM

## 2024-08-08 DIAGNOSIS — R53.1 GENERALIZED WEAKNESS: ICD-10-CM

## 2024-08-08 DIAGNOSIS — R76.8 ANA POSITIVE: Primary | ICD-10-CM

## 2024-08-08 DIAGNOSIS — G30.9 MODERATE MAJOR NEUROCOGNITIVE DISORDER DUE TO ALZHEIMER'S DISEASE WITH BEHAVIORAL DISTURBANCE: ICD-10-CM

## 2024-08-08 DIAGNOSIS — Z86.711 HISTORY OF PULMONARY EMBOLISM: ICD-10-CM

## 2024-08-08 DIAGNOSIS — M32.8 OTHER FORMS OF SYSTEMIC LUPUS ERYTHEMATOSUS, UNSPECIFIED ORGAN INVOLVEMENT STATUS: ICD-10-CM

## 2024-08-08 DIAGNOSIS — I50.32 CHRONIC DIASTOLIC CONGESTIVE HEART FAILURE: ICD-10-CM

## 2024-08-08 DIAGNOSIS — I25.2 HISTORY OF NON-ST ELEVATION MYOCARDIAL INFARCTION (NSTEMI): ICD-10-CM

## 2024-08-08 DIAGNOSIS — E44.0 PROTEIN-CALORIE MALNUTRITION, MODERATE: Chronic | ICD-10-CM

## 2024-08-08 DIAGNOSIS — N18.31 TYPE 2 DIABETES MELLITUS WITH STAGE 3A CHRONIC KIDNEY DISEASE, WITHOUT LONG-TERM CURRENT USE OF INSULIN: Chronic | ICD-10-CM

## 2024-08-08 DIAGNOSIS — E11.22 TYPE 2 DIABETES MELLITUS WITH STAGE 3A CHRONIC KIDNEY DISEASE, WITHOUT LONG-TERM CURRENT USE OF INSULIN: Chronic | ICD-10-CM

## 2024-08-08 DIAGNOSIS — R76.8 POSITIVE DOUBLE STRANDED DNA ANTIBODY TEST: ICD-10-CM

## 2024-08-08 DIAGNOSIS — I73.9 PAD (PERIPHERAL ARTERY DISEASE): ICD-10-CM

## 2024-08-08 DIAGNOSIS — D89.2 HYPERGAMMAGLOBULINEMIA: ICD-10-CM

## 2024-08-08 PROCEDURE — 3078F DIAST BP <80 MM HG: CPT | Mod: CPTII,S$GLB,, | Performed by: STUDENT IN AN ORGANIZED HEALTH CARE EDUCATION/TRAINING PROGRAM

## 2024-08-08 PROCEDURE — 99205 OFFICE O/P NEW HI 60 MIN: CPT | Mod: S$GLB,,, | Performed by: STUDENT IN AN ORGANIZED HEALTH CARE EDUCATION/TRAINING PROGRAM

## 2024-08-08 PROCEDURE — 1159F MED LIST DOCD IN RCRD: CPT | Mod: CPTII,S$GLB,, | Performed by: STUDENT IN AN ORGANIZED HEALTH CARE EDUCATION/TRAINING PROGRAM

## 2024-08-08 PROCEDURE — 1160F RVW MEDS BY RX/DR IN RCRD: CPT | Mod: CPTII,S$GLB,, | Performed by: STUDENT IN AN ORGANIZED HEALTH CARE EDUCATION/TRAINING PROGRAM

## 2024-08-08 PROCEDURE — 99999 PR PBB SHADOW E&M-EST. PATIENT-LVL IV: CPT | Mod: PBBFAC,,, | Performed by: STUDENT IN AN ORGANIZED HEALTH CARE EDUCATION/TRAINING PROGRAM

## 2024-08-08 PROCEDURE — 3288F FALL RISK ASSESSMENT DOCD: CPT | Mod: CPTII,S$GLB,, | Performed by: STUDENT IN AN ORGANIZED HEALTH CARE EDUCATION/TRAINING PROGRAM

## 2024-08-08 PROCEDURE — 1100F PTFALLS ASSESS-DOCD GE2>/YR: CPT | Mod: CPTII,S$GLB,, | Performed by: STUDENT IN AN ORGANIZED HEALTH CARE EDUCATION/TRAINING PROGRAM

## 2024-08-08 PROCEDURE — 1126F AMNT PAIN NOTED NONE PRSNT: CPT | Mod: CPTII,S$GLB,, | Performed by: STUDENT IN AN ORGANIZED HEALTH CARE EDUCATION/TRAINING PROGRAM

## 2024-08-08 PROCEDURE — 3074F SYST BP LT 130 MM HG: CPT | Mod: CPTII,S$GLB,, | Performed by: STUDENT IN AN ORGANIZED HEALTH CARE EDUCATION/TRAINING PROGRAM

## 2024-08-08 RX ORDER — HYDROXYCHLOROQUINE SULFATE 200 MG/1
200 TABLET, FILM COATED ORAL DAILY
Qty: 30 TABLET | Refills: 3 | Status: SHIPPED | OUTPATIENT
Start: 2024-08-08

## 2024-08-12 ENCOUNTER — TELEPHONE (OUTPATIENT)
Dept: HEMATOLOGY/ONCOLOGY | Facility: CLINIC | Age: 79
End: 2024-08-12
Payer: MEDICARE

## 2024-08-12 DIAGNOSIS — R79.89 ELEVATED FERRITIN: ICD-10-CM

## 2024-08-12 DIAGNOSIS — D64.9 ANEMIA: Primary | ICD-10-CM

## 2024-08-12 NOTE — TELEPHONE ENCOUNTER
Spoke to patient's daughter in reference to Hematology referral from Dr. Patel.  Appointment scheduled per patient's request next available in [place]. Appointment notice mailed.

## 2024-08-20 LAB
LEFT EYE DM RETINOPATHY: NEGATIVE
RIGHT EYE DM RETINOPATHY: NEGATIVE

## 2024-08-21 ENCOUNTER — OFFICE VISIT (OUTPATIENT)
Dept: PODIATRY | Facility: CLINIC | Age: 79
End: 2024-08-21
Payer: MEDICARE

## 2024-08-21 DIAGNOSIS — Z86.73 HISTORY OF CVA (CEREBROVASCULAR ACCIDENT): ICD-10-CM

## 2024-08-21 DIAGNOSIS — I69.354 HEMIPLEGIA AND HEMIPARESIS FOLLOWING CEREBRAL INFARCTION AFFECTING LEFT NON-DOMINANT SIDE: ICD-10-CM

## 2024-08-21 DIAGNOSIS — L60.3 ONYCHODYSTROPHY: ICD-10-CM

## 2024-08-21 DIAGNOSIS — E11.22 TYPE 2 DIABETES MELLITUS WITH STAGE 3A CHRONIC KIDNEY DISEASE, WITHOUT LONG-TERM CURRENT USE OF INSULIN: Primary | ICD-10-CM

## 2024-08-21 DIAGNOSIS — N18.31 TYPE 2 DIABETES MELLITUS WITH STAGE 3A CHRONIC KIDNEY DISEASE, WITHOUT LONG-TERM CURRENT USE OF INSULIN: Primary | ICD-10-CM

## 2024-08-21 DIAGNOSIS — Z79.01 ANTICOAGULATED: ICD-10-CM

## 2024-08-21 PROCEDURE — 99499 UNLISTED E&M SERVICE: CPT | Mod: S$GLB,,, | Performed by: PODIATRIST

## 2024-08-21 PROCEDURE — 11721 DEBRIDE NAIL 6 OR MORE: CPT | Mod: Q9,S$GLB,, | Performed by: PODIATRIST

## 2024-08-21 PROCEDURE — 99999 PR PBB SHADOW E&M-EST. PATIENT-LVL III: CPT | Mod: PBBFAC,,, | Performed by: PODIATRIST

## 2024-08-21 NOTE — PROGRESS NOTES
Subjective:       Patient ID: Marianne Cali is a 78 y.o. female.    Chief Complaint: Diabetes Mellitus (Patient denies pain at present. Patient is diabetic. Patient was last seen 6.13.24 by Gabbie Haynes)      HPI: Patient presents to the office today with the chief complaint of elongated, thickened and dystrophic nail plates to the B/L foot.  Has been recently evaluated by Neurology due to memory loss.  Concerned for Alzheimer's. Presents clinic with family members present.  Patient to having lupus (diagnosed in 2014 lost to follow-up until 2020 and currently following with rheumatology).  Does have history of type 2 diabetes mellitus and long-term anticoagulation therapy being managed by her  PCP and diabetic management.  Last appointment with Gabbie Haynes NP with diabetic management on 06/13/2024.. This patient's PMD is Antoni Patel MD. This patient last saw his/her primary care provider on 04/30/2024.     Hemoglobin A1C   Date Value Ref Range Status   07/30/2024 7.4 (H) 4.0 - 5.6 % Final     Comment:     ADA Screening Guidelines:  5.7-6.4%  Consistent with prediabetes  >or=6.5%  Consistent with diabetes    High levels of fetal hemoglobin interfere with the HbA1C  assay. Heterozygous hemoglobin variants (HbS, HgC, etc)do  not significantly interfere with this assay.   However, presence of multiple variants may affect accuracy.     04/11/2024 8.5 (H) 4.0 - 5.6 % Final     Comment:     ADA Screening Guidelines:  5.7-6.4%  Consistent with prediabetes  >or=6.5%  Consistent with diabetes    High levels of fetal hemoglobin interfere with the HbA1C  assay. Heterozygous hemoglobin variants (HbS, HgC, etc)do  not significantly interfere with this assay.   However, presence of multiple variants may affect accuracy.     01/19/2024 11.5 (H) 4.0 - 5.6 % Final     Comment:     ADA Screening Guidelines:  5.7-6.4%  Consistent with prediabetes  >or=6.5%  Consistent with diabetes    High levels of fetal  hemoglobin interfere with the HbA1C  assay. Heterozygous hemoglobin variants (HbS, HgC, etc)do  not significantly interfere with this assay.   However, presence of multiple variants may affect accuracy.     .     Review of patient's allergies indicates:   Allergen Reactions    Morphine Anxiety and Other (See Comments)     Dizziness       Past Medical History:   Diagnosis Date    Allergy     Cataract     Dementia     Depression     Diabetes mellitus, type 2     Heart murmur     Hypertension     Stroke        Family History   Problem Relation Name Age of Onset    Cancer Mother      Hypertension Mother      Cancer Sister         Social History     Socioeconomic History    Marital status:    Tobacco Use    Smoking status: Never    Smokeless tobacco: Never   Substance and Sexual Activity    Alcohol use: Never    Drug use: Never    Sexual activity: Never     Social Determinants of Health     Financial Resource Strain: Unknown (10/22/2018)    Received from [facility]    Overall Financial Resource Strain (CARDIA)     Difficulty of Paying Living Expenses: Patient declined   Food Insecurity: Unknown (2/2/2024)    Received from [facility]    Hunger Vital Sign     Ran Out of Food in the Last Year: Never true   Transportation Needs: Unknown (2/2/2024)    Received from [facility]    PRAPARE - Transportation     Lack of Transportation (Medical): No   Physical Activity: Unknown (10/22/2018)    Received from [facility]    Exercise Vital Sign     Days of Exercise per Week: Patient declined     Minutes of Exercise per Session: Patient declined   Stress: Unknown (10/22/2018)    Received from [facility]    [employer] - Occupational Stress Questionnaire     Feeling of Stress : Patient declined   Housing  Stability: Unknown (2/2/2024)    [facility]    Housing Stability Vital Sign     Unstable Housing in the Last Year: No       Past Surgical History:   Procedure Laterality Date    EYE SURGERY      HIP REPLACEMENT ARTHROPLASTY      HYSTERECTOMY         Review of Systems       Objective:   There were no vitals taken for this visit.    Physical Exam  LOWER EXTREMITY PHYSICAL EXAMINATION    VASCULAR:  The right dorsalis pedis pulse 2/4 and the right posterior tibial pulse 2/4.  The left dorsalis pedis pulse 2/4 and posterior tibial pulse on the left is 2/4.  Capillary refill is intact.  Pedal hair growth intact    NEUROLOGY:  Protective sensation is intact with [place] monofilament. Proprioception is intact. Intact sensation to light touch.  Vibratory sensation is diminished to the 1st metatarsal phalangeal joint.     DERMATOLOGY:  Skin is supple, moist, intact.  There is no signs of callusing, ulcerations, other lesions identified to the dorsal or plantar aspect of the right or left foot.  The R1, 2, 5 and left L1,2, 5 are thickened, discolored dystrophic.  There is subungual debris.  Nail plates have area of dark discoloration.  The remaining nails 3-4 on the right foot and the left foot are elongated but of normal color, thickness, and texture.   There is no signs of ingrowing into the medial or lateral borders.  There is no evidence of wounds or skin breakdown.  No edema or erythema.  No obvious lacerations or fissuring.  Interdigital spaces are clean, dry, intact.  No rashes or scars appreciated.    ORTHOPEDIC:  No history of amputations.  Weakness to the left lower extremity due history of stroke    Assessment:     1. Type 2 diabetes mellitus with stage 3a chronic kidney disease, without long-term current use of insulin    2. History of CVA (cerebrovascular accident)    3. Hemiplegia and hemiparesis following cerebral infarction affecting left  non-dominant side    4. Anticoagulated    5. Onychodystrophy        Plan:     Type 2 diabetes mellitus with stage 3a chronic kidney disease, without long-term current use of insulin    History of CVA (cerebrovascular accident)    Hemiplegia and hemiparesis following cerebral infarction affecting left non-dominant side    Anticoagulated    Onychodystrophy        Thorough discussion is had with the patient this afternoon, concerning the diagnosis, its etiology, and the treatment algorithm at present.  Greater than 50% of this visit spent on counseling and coordination of care. Greater than 15 minutes of a 20 minute appointment spent on education about the diabetic foot, neuropathy, and prevention of limb loss.  Shoe inspection. Diabetic Foot Education. Patient reminded of the importance of good nutrition and blood sugar control to help prevent podiatric complications of diabetes. Patient instructed on proper foot hygeine. We discussed wearing proper and supportive shoe gear, daily foot inspections, never walking barefooted or sock footed, never putting sharp instruments to feet which can cause major complications associated with infection, ulcers, lacerations.      Dystrophic nail plates, as outlined above (R#1,2,5  ; L#1,2,5 ), are sharply debrided with double action nail nipper, and/or with the assistance of a mechanical rotary alma, with removal of all offending nail and nail border(s), for reduction of pains. Nails are reduced in terms of length, width and girth with removal of subungual debris to facilitate pain free weight bearing and ambulation. The elongated nails as outlined in the objective portion of this note, were trimmed to appropriate length, with a double action nail nipper, for alleviation/reduction of pains as well. Follow up in approx. 3-4 months.        Future Appointments   Date Time Provider Department Center   9/19/2024  2:30 PM Jo Ann Haynes FNP [facility] [place]   9/19/2024  3:05  PM LABORATORY, [facility] [place]   10/2/2024 11:00 AM Nicole Lamb NP [facility] [place]   11/6/2024  3:00 PM Kaylah Bess NP [facility]   12/30/2024 10:30 AM Marcia Merchant DPM [facility] POD [facility]   1/9/2025 10:00 AM LABORATORY, [facility] [place]   1/16/2025  8:30 AM Renetta Shetty MD [facility] RHEU [facility]   1/27/2025 10:40 AM Mary King NP [facility] UROLOGY [facility]

## 2024-08-29 ENCOUNTER — TELEPHONE (OUTPATIENT)
Dept: FAMILY MEDICINE | Facility: CLINIC | Age: 79
End: 2024-08-29
Payer: MEDICARE

## 2024-08-29 NOTE — TELEPHONE ENCOUNTER
Pt has been called. I spoke with w/pt daughter and she was advised that an apt is needed prior to HH orders being sent. Apt has been made for pt and Tasia was called and notified and verbalized understanding.

## 2024-08-29 NOTE — TELEPHONE ENCOUNTER
Call returned. Spoke w/Tasia at [facility] and she is requesting a HH order for nursing and PT services. She stated that she will facilitate a HH company for the pt. Please see order and addendum as needed. Thank you.

## 2024-08-29 NOTE — TELEPHONE ENCOUNTER
----- Message from Sadie Webb sent at 8/29/2024  9:31 AM CDT -----  Contact: Thais/[facility] marjan Plascencia is needing a call back regarding needing orders for home health, physical therapy, and nursing. Please call back at [phone] fax [phone]. Send the most resent providers notes.     Thank you summer

## 2024-08-29 NOTE — TELEPHONE ENCOUNTER
I have not seen this patient since April.  Face-to-face for home health orders needs to be within 30 days.  Patient can see me or Edna Wallace NP for orders.

## 2024-09-03 ENCOUNTER — OFFICE VISIT (OUTPATIENT)
Dept: FAMILY MEDICINE | Facility: CLINIC | Age: 79
End: 2024-09-03
Payer: MEDICARE

## 2024-09-03 VITALS
RESPIRATION RATE: 16 BRPM | HEART RATE: 76 BPM | BODY MASS INDEX: 18.51 KG/M2 | DIASTOLIC BLOOD PRESSURE: 74 MMHG | HEIGHT: 69 IN | TEMPERATURE: 98 F | WEIGHT: 125 LBS | OXYGEN SATURATION: 96 % | SYSTOLIC BLOOD PRESSURE: 126 MMHG

## 2024-09-03 DIAGNOSIS — G30.9 MODERATE MAJOR NEUROCOGNITIVE DISORDER DUE TO ALZHEIMER'S DISEASE WITH BEHAVIORAL DISTURBANCE: ICD-10-CM

## 2024-09-03 DIAGNOSIS — F02.B18 MODERATE MAJOR NEUROCOGNITIVE DISORDER DUE TO ALZHEIMER'S DISEASE WITH BEHAVIORAL DISTURBANCE: ICD-10-CM

## 2024-09-03 DIAGNOSIS — I69.354 HEMIPLEGIA AND HEMIPARESIS FOLLOWING CEREBRAL INFARCTION AFFECTING LEFT NON-DOMINANT SIDE: ICD-10-CM

## 2024-09-03 DIAGNOSIS — R53.1 GENERALIZED WEAKNESS: Primary | ICD-10-CM

## 2024-09-03 PROCEDURE — 99213 OFFICE O/P EST LOW 20 MIN: CPT | Mod: S$GLB,,, | Performed by: NURSE PRACTITIONER

## 2024-09-03 PROCEDURE — 1160F RVW MEDS BY RX/DR IN RCRD: CPT | Mod: CPTII,S$GLB,, | Performed by: NURSE PRACTITIONER

## 2024-09-03 PROCEDURE — 3074F SYST BP LT 130 MM HG: CPT | Mod: CPTII,S$GLB,, | Performed by: NURSE PRACTITIONER

## 2024-09-03 PROCEDURE — 99999 PR PBB SHADOW E&M-EST. PATIENT-LVL V: CPT | Mod: PBBFAC,,, | Performed by: NURSE PRACTITIONER

## 2024-09-03 PROCEDURE — 3078F DIAST BP <80 MM HG: CPT | Mod: CPTII,S$GLB,, | Performed by: NURSE PRACTITIONER

## 2024-09-03 PROCEDURE — 1159F MED LIST DOCD IN RCRD: CPT | Mod: CPTII,S$GLB,, | Performed by: NURSE PRACTITIONER

## 2024-09-03 PROCEDURE — 1126F AMNT PAIN NOTED NONE PRSNT: CPT | Mod: CPTII,S$GLB,, | Performed by: NURSE PRACTITIONER

## 2024-09-03 PROCEDURE — 3288F FALL RISK ASSESSMENT DOCD: CPT | Mod: CPTII,S$GLB,, | Performed by: NURSE PRACTITIONER

## 2024-09-03 PROCEDURE — 1101F PT FALLS ASSESS-DOCD LE1/YR: CPT | Mod: CPTII,S$GLB,, | Performed by: NURSE PRACTITIONER

## 2024-09-03 NOTE — PROGRESS NOTES
Assessment/Plan:  Problem List Items Addressed This Visit          Neuro    Hemiplegia and hemiparesis following cerebral infarction affecting left non-dominant side    Overview     Patient with chronic left-sided weakness.          Current Assessment & Plan     Referral to home health for further evaluation and treatment with PT and OT.         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 Control uncertain. She is taking namenda. Followed by neurology.          Current Assessment & Plan     Continue current medications and plan of care per neurology             Other    Generalized weakness - Primary    Overview     Chronic. Generalized weakness/debility. Lives at home with her . Has cane, walker, wheelchair. She is interested in home health and physical therapy.          Current Assessment & Plan     Home health and physical therapy ordered. Fall precautions. Continue assistive devices.          Relevant Orders    Ambulatory referral/consult to Home Health     Follow up if symptoms worsen or fail to improve.  ER precautions for severe or worsening symptoms.     Edna Wallace NP  _____________________________________________________________________________________________________________________________________________________    History of Present Illness    CHIEF COMPLAINT:  Ms. Cali presents today accompanied by her daughter for home health services.    FUNCTIONAL STATUS:  History provided by patient's daughter. The patient lives with her  but has been having worsening lower extremity weakness. She has a walker at home to assist with mobility. She reports no recent injuries, falls, or known trauma. History of CVA with left sided residual weakness, history of frequent falls, Alzheimer's. She is requesting orders for home health and physical therapy.     Past Medical History:  Past Medical History:   Diagnosis Date     Allergy     Cataract     Dementia     Depression     Diabetes mellitus, type 2     Heart murmur     Hypertension     Stroke      Past Surgical History:   Procedure Laterality Date    EYE SURGERY      HIP REPLACEMENT ARTHROPLASTY      HYSTERECTOMY       Review of patient's allergies indicates:   Allergen Reactions    Morphine Anxiety and Other (See Comments)     Dizziness     Social History     Tobacco Use    Smoking status: Never    Smokeless tobacco: Never   Substance Use Topics    Alcohol use: Never    Drug use: Never     Family History   Problem Relation Name Age of Onset    Cancer Mother      Hypertension Mother      Cancer Sister       Current Outpatient Medications on File Prior to Visit   Medication Sig Dispense Refill    ACCU-CHEK AMELIA PLUS METER Misc Use 1 device  three times a day 1 each 0    ACCU-CHEK AMELIA PLUS TEST STRP Strp Inject 1 strip into the skin 3 (three) times daily. 100 each 3    amLODIPine (NORVASC) 5 MG tablet Take 1 tablet (5 mg total) by mouth 2 (two) times daily. 60 tablet 12    atorvastatin (LIPITOR) 80 MG tablet Take 1 tablet (80 mg total) by mouth once daily. 90 tablet 4    blood-glucose meter,continuous (DEXCOM G7 ) Misc 1 Device by Misc.(Non-Drug; Combo Route) route once daily. 1 each 0    blood-glucose sensor (DEXCOM G7 SENSOR) Philomena 1 Device by Misc.(Non-Drug; Combo Route) route every 10 days. 3 each 11    carvediloL (COREG) 3.125 MG tablet TAKE 1 TABLET BY MOUTH EVERY MORNING and ONE IN THE EVENING WITH A MEAL      diclofenac sodium (VOLTAREN) 1 % Gel Apply topically.      divalproex (DEPAKOTE) 250 MG EC tablet Take 1 tablet (250 mg total) by mouth every 12 (twelve) hours. 60 tablet 11    ELIQUIS 5 mg Tab TAKE TWO TABLETS BY MOUTH TWICE DAILY FOR 13 DAYS, then TAKE 1 TABLET BY MOUTH TWICE DAILY FOR 17 days      GEMTESA 75 mg Tab Take 1 tablet by mouth once daily.      hydroxychloroquine (PLAQUENIL) 200 mg tablet Take 1 tablet (200 mg total) by mouth once daily. 30 tablet 3  "   insulin (LANTUS SOLOSTAR U-100 INSULIN) glargine 100 units/mL SubQ pen Inject 5 Units into the skin every evening. 4.5 mL 3    irbesartan-hydrochlorothiazide (AVALIDE) 300-12.5 mg per tablet irbesartan 300 mg-hydrochlorothiazide 12.5 mg tablet   TAKE 1 TABLET BY MOUTH EVERY DAY      isosorbide mononitrate (IMDUR) 30 MG 24 hr tablet TAKE 1 TABLET BY MOUTH ONCE DAILY 90 tablet 1    JANUVIA 50 mg Tab Take 1 tablet (50 mg total) by mouth once daily. 90 tablet 4    memantine (NAMENDA) 10 MG Tab Take 1 tablet (10 mg total) by mouth 2 (two) times daily. 60 tablet 11    metFORMIN (GLUCOPHAGE) 500 MG tablet Take 1 tablet (500 mg total) by mouth 2 (two) times daily with meals. 60 tablet 11    pantoprazole (PROTONIX) 40 MG tablet Take 40 mg by mouth every morning.       No current facility-administered medications on file prior to visit.     Review of Systems   Constitutional:  Positive for fatigue. Negative for chills, fever and unexpected weight change.   HENT:  Negative for ear pain and sore throat.    Eyes:  Negative for redness and visual disturbance.   Respiratory:  Negative for cough and shortness of breath.    Cardiovascular:  Negative for chest pain and palpitations.   Gastrointestinal:  Negative for abdominal distention, abdominal pain, blood in stool, constipation, diarrhea, nausea and vomiting.   Genitourinary:  Negative for difficulty urinating and hematuria.   Musculoskeletal:  Positive for arthralgias and gait problem. Negative for myalgias.   Skin:  Negative for rash and wound.   Neurological:  Positive for weakness. Negative for headaches.   Psychiatric/Behavioral:  Positive for dysphoric mood. Negative for sleep disturbance. The patient is not nervous/anxious.      Vitals:    09/03/24 1307   BP: 126/74   Pulse: 76   Resp: 16   Temp: 98.1 °F (36.7 °C)   TempSrc: Oral   SpO2: 96%   Weight: 56.7 kg (125 lb)   Height: 5' 9" (1.753 m)     Wt Readings from Last 3 Encounters:   09/03/24 56.7 kg (125 lb) "   08/08/24 57 kg (125 lb 10.6 oz)   06/13/24 57 kg (125 lb 9.6 oz)     Physical Exam  Vitals and nursing note reviewed.   Constitutional:       General: She is not in acute distress.     Appearance: She is well-developed. She is not ill-appearing, toxic-appearing or diaphoretic.      Comments: Here with Daughter    URBAN:      Head: Normocephalic and atraumatic.      Right Ear: Hearing and external ear normal.      Left Ear: Hearing and external ear normal.      Nose: Nose normal. No rhinorrhea.   Eyes:      General: Lids are normal.      Extraocular Movements: Extraocular movements intact.      Conjunctiva/sclera: Conjunctivae normal.      Pupils: Pupils are equal, round, and reactive to light.   Cardiovascular:      Rate and Rhythm: Normal rate.      Pulses: Normal pulses.   Pulmonary:      Effort: Pulmonary effort is normal. No respiratory distress.      Breath sounds: Normal breath sounds.   Abdominal:      General: Bowel sounds are normal.      Palpations: Abdomen is soft.   Musculoskeletal:         General: Normal range of motion.      Cervical back: Normal range of motion and neck supple.   Skin:     General: Skin is warm and dry.      Capillary Refill: Capillary refill takes less than 2 seconds.      Coloration: Skin is not pale.   Neurological:      General: No focal deficit present.      Mental Status: She is alert and oriented to person, place, and time. Mental status is at baseline. She is not disoriented.      Cranial Nerves: No cranial nerve deficit.      Motor: Weakness (chronic generalized, slight L>R) present.      Gait: Gait abnormal (In wheelchair today).   Psychiatric:         Attention and Perception: She is inattentive. She does not perceive auditory or visual hallucinations.         Mood and Affect: Mood normal. Mood is not anxious or depressed.         Speech: Speech is not rapid and pressured or slurred.         Behavior: Behavior normal. Behavior is not agitated, aggressive or hyperactive.  Behavior is cooperative.         Thought Content: Thought content normal. Thought content is not paranoid or delusional. Thought content does not include homicidal or suicidal ideation. Thought content does not include homicidal or suicidal plan.         Cognition and Memory: Memory is impaired.         Judgment: Judgment normal.       Health Maintenance   Topic Date Due    Eye Exam  Never done    DEXA Scan  08/13/2024    Shingles Vaccine (1 of 2) 04/30/2025 (Originally 11/5/1995)    Hemoglobin A1c  01/30/2025    Foot Exam  06/13/2025    Lipid Panel  07/30/2025    TETANUS VACCINE  08/08/2027    Hepatitis C Screening  Completed     DISCLAIMER: This note was compiled by using a speech recognition dictation system and therefore please be aware that typographical / speech recognition errors can and do occur.  Please contact me if you see any errors specifically.  Consent was obtained for [employer] recording system prior to the visit.

## 2024-09-10 ENCOUNTER — PATIENT OUTREACH (OUTPATIENT)
Dept: ADMINISTRATIVE | Facility: HOSPITAL | Age: 79
End: 2024-09-10
Payer: MEDICARE

## 2024-09-11 ENCOUNTER — PATIENT MESSAGE (OUTPATIENT)
Dept: FAMILY MEDICINE | Facility: CLINIC | Age: 79
End: 2024-09-11
Payer: MEDICARE

## 2024-09-18 ENCOUNTER — TELEPHONE (OUTPATIENT)
Dept: FAMILY MEDICINE | Facility: CLINIC | Age: 79
End: 2024-09-18
Payer: MEDICARE

## 2024-09-18 DIAGNOSIS — Z78.0 MENOPAUSE: ICD-10-CM

## 2024-09-18 NOTE — TELEPHONE ENCOUNTER
----- Message from Della Hanna sent at 9/18/2024  2:05 PM CDT -----  Contact: Erika/[facility]  Erika is calling in rgd to needing to report that the pt is on her way to hospital, b/p is 84/48 and sugar 338.  Thank you, if need call Clint's office [phone].

## 2024-09-18 NOTE — TELEPHONE ENCOUNTER
Returned call to Erika at [facility] no answer left  [phone]    Called pt daughter Chemo. She stated pt was disoriented, bp 84/40, was given one pint of blood on Saturday and pt went to ER and was d/c. HH went to the pt house and bp was again 80/40 and pt was symptomatic.  nurse advised they sit pt up and wait and hour and retake bp. I advised Chemo to bring pt back to ER now. Verbalized understanding.

## 2024-09-20 ENCOUNTER — TELEPHONE (OUTPATIENT)
Dept: FAMILY MEDICINE | Facility: CLINIC | Age: 79
End: 2024-09-20
Payer: MEDICARE

## 2024-09-20 NOTE — TELEPHONE ENCOUNTER
----- Message from Ashley Harding sent at 9/20/2024  2:24 PM CDT -----  Contact: [phone]  Caller is requesting an earlier appointment than what we can offer.  Caller declined first available appointment listed below.  Caller will not accept being placed on the waitlist and is requesting a message be sent to doctor.    Did you offer to schedule the next available appt and put the patient on the wait list:  n/a    When is the first available appointment: 01/02/24    Preference of timeframe to be scheduled:  asap    Symptoms: ER follow up visit    Would the patient prefer a call back or a response via "[employer]:  call back    Additional Information:  Chari Cali is calling to schedule an appt with the provider or the NP due to an ER visit the pt had. Please call Marianne back for advice

## 2024-09-20 NOTE — TELEPHONE ENCOUNTER
Call returned. F/u apt has been scheduled for 10/30/2024 @ 10:00am. Pt daughter verbalized understanding.

## 2024-09-27 ENCOUNTER — EXTERNAL HOME HEALTH (OUTPATIENT)
Dept: HOME HEALTH SERVICES | Facility: HOSPITAL | Age: 79
End: 2024-09-27
Payer: MEDICARE

## 2024-10-07 ENCOUNTER — DOCUMENT SCAN (OUTPATIENT)
Dept: HOME HEALTH SERVICES | Facility: HOSPITAL | Age: 79
End: 2024-10-07
Payer: MEDICARE

## 2024-10-14 ENCOUNTER — DOCUMENT SCAN (OUTPATIENT)
Dept: HOME HEALTH SERVICES | Facility: HOSPITAL | Age: 79
End: 2024-10-14
Payer: MEDICARE

## 2024-10-15 ENCOUNTER — EXTERNAL HOME HEALTH (OUTPATIENT)
Dept: HOME HEALTH SERVICES | Facility: HOSPITAL | Age: 79
End: 2024-10-15
Payer: MEDICARE

## 2024-11-13 ENCOUNTER — TELEPHONE (OUTPATIENT)
Dept: DIABETES | Facility: CLINIC | Age: 79
End: 2024-11-13
Payer: MEDICARE

## 2024-11-13 NOTE — TELEPHONE ENCOUNTER
I attempt to call and confirm pt appt with Jo Ann Haynes NP for tmr 11/14, the patients daughter answered and stated that the patient would not be able to come for visit she stated that the patients health is declining and she can't walk at this moment. I asked patient would they like to r/s the appt now or would they like to call us at a more convenient time she stated she would call back and schedule her mom at later time.

## 2024-11-18 ENCOUNTER — LAB VISIT (OUTPATIENT)
Dept: LAB | Facility: HOSPITAL | Age: 79
End: 2024-11-18
Attending: FAMILY MEDICINE
Payer: MEDICARE

## 2024-11-18 ENCOUNTER — TELEPHONE (OUTPATIENT)
Dept: FAMILY MEDICINE | Facility: CLINIC | Age: 79
End: 2024-11-18
Payer: MEDICARE

## 2024-11-18 DIAGNOSIS — R82.998 DARK URINE: Primary | ICD-10-CM

## 2024-11-18 DIAGNOSIS — N39.0 URINARY TRACT INFECTION, SITE NOT SPECIFIED: ICD-10-CM

## 2024-11-18 LAB
BACTERIA #/AREA URNS AUTO: ABNORMAL /HPF
BILIRUB UR QL STRIP: NEGATIVE
CLARITY UR REFRACT.AUTO: CLEAR
COLOR UR AUTO: YELLOW
GLUCOSE UR QL STRIP: NEGATIVE
HGB UR QL STRIP: NEGATIVE
KETONES UR QL STRIP: NEGATIVE
LEUKOCYTE ESTERASE UR QL STRIP: ABNORMAL
MICROSCOPIC COMMENT: ABNORMAL
NITRITE UR QL STRIP: NEGATIVE
PH UR STRIP: 6 [PH] (ref 5–8)
PROT UR QL STRIP: NEGATIVE
RBC #/AREA URNS AUTO: 1 /HPF (ref 0–4)
SP GR UR STRIP: 1.02 (ref 1–1.03)
URN SPEC COLLECT METH UR: ABNORMAL
WBC #/AREA URNS AUTO: 42 /HPF (ref 0–5)

## 2024-11-18 PROCEDURE — 81001 URINALYSIS AUTO W/SCOPE: CPT | Performed by: FAMILY MEDICINE

## 2024-11-18 PROCEDURE — 87086 URINE CULTURE/COLONY COUNT: CPT | Performed by: FAMILY MEDICINE

## 2024-11-18 NOTE — TELEPHONE ENCOUNTER
Thea with [facility] called and stated pt family called with concerns that patient has a UTI due to not being as alert and having dark urine. Verbal orders given to UA to be dropped off at our clinic and PRN visit from HH. ER precautions given.

## 2024-11-18 NOTE — TELEPHONE ENCOUNTER
----- Message from Magalie sent at 11/18/2024  9:35 AM CST -----  Contact: Thea [phone]  Thea calling from [employer] in regards to getting orders for PRN visit and urine.  Please call to advise.

## 2024-11-18 NOTE — TELEPHONE ENCOUNTER
I have signed for the following orders AND/OR meds.  Please call the patient and ask the patient to schedule the testing AND/OR inform about any medications that were sent.      Orders Placed This Encounter   Procedures    Urinalysis, Reflex to Urine Culture Urine, Clean Catch     Standing Status:   Future     Standing Expiration Date:   1/17/2026     Order Specific Question:   Preferred Collection Type     Answer:   Urine, Clean Catch     Order Specific Question:   Specimen Source     Answer:   Urine

## 2024-11-19 ENCOUNTER — TELEPHONE (OUTPATIENT)
Dept: FAMILY MEDICINE | Facility: CLINIC | Age: 79
End: 2024-11-19
Payer: MEDICARE

## 2024-11-19 ENCOUNTER — PATIENT MESSAGE (OUTPATIENT)
Dept: GASTROENTEROLOGY | Facility: CLINIC | Age: 79
End: 2024-11-19
Payer: MEDICARE

## 2024-11-19 DIAGNOSIS — N18.31 TYPE 2 DIABETES MELLITUS WITH STAGE 3A CHRONIC KIDNEY DISEASE, WITHOUT LONG-TERM CURRENT USE OF INSULIN: ICD-10-CM

## 2024-11-19 DIAGNOSIS — E11.22 TYPE 2 DIABETES MELLITUS WITH STAGE 3A CHRONIC KIDNEY DISEASE, WITHOUT LONG-TERM CURRENT USE OF INSULIN: ICD-10-CM

## 2024-11-19 DIAGNOSIS — N30.00 ACUTE CYSTITIS WITHOUT HEMATURIA: Primary | ICD-10-CM

## 2024-11-19 RX ORDER — INSULIN GLARGINE 100 [IU]/ML
5 INJECTION, SOLUTION SUBCUTANEOUS NIGHTLY
Qty: 15 ML | Refills: 0 | Status: SHIPPED | OUTPATIENT
Start: 2024-11-19

## 2024-11-19 RX ORDER — CIPROFLOXACIN 500 MG/1
500 TABLET ORAL 2 TIMES DAILY
Qty: 14 TABLET | Refills: 0 | Status: SHIPPED | OUTPATIENT
Start: 2024-11-19 | End: 2024-11-26

## 2024-11-19 NOTE — PROGRESS NOTES
Please call to make sure patient get the results.    [phone], Your urinalysis is abnormal.  Showing infection.  I am sending antibiotic ciprofloxacin to the pharmacy.  Further evaluation of urine with urine culture is pending.  Please follow-up sooner if no improvement in symptoms.  ER precautions for severe symptoms.

## 2024-11-19 NOTE — TELEPHONE ENCOUNTER
Provider Staff:  Action required for this patient    Requires labs      Please see care gap opportunities below in Care Due Message.    Thanks!  [facility]     Appointments      Date Provider   Last Visit   4/30/2024 Antoni Patel MD   Next Visit   Visit date not found Antoni Patel MD     Refill Decision Note   [facility]  is requesting a refill authorization.  Brief Assessment and Rationale for Refill:  Approve     Medication Therapy Plan:  Patient was seen in the ED on 9/18/24 for Dizziness. NO changes made to MED. Follow-up appt. with PCP has been recommended by acute/care provider. Approve per protocol.      Extended chart review required: Yes   Comments:     Note composed:12:23 PM 11/19/2024

## 2024-11-19 NOTE — TELEPHONE ENCOUNTER
----- Message from Valeriy sent at 11/19/2024  9:35 AM CST -----  Contact: Shireen/daughter  Type:  RX Refill Request    Who Called: Shireen  Refill or New Rx:refill  RX Name and Strength:insulin (LANTUS SOLOSTAR U-100 INSULIN) glargine 100 units/mL SubQ pen and needles   How is the patient currently taking it? (ex. 1XDay):  Is this a 30 day or 90 day RX:  Preferred Pharmacy with phone number:  [facility] - [place], [place] - [address] [facility] [address]  Phone: [phone] Fax: [phone]      Local or Mail Order:local  Ordering Provider:patric  Would the patient rather a call back or a response via [facility]? call  Best Call Back Number:[phone]   Additional Information:

## 2024-11-19 NOTE — TELEPHONE ENCOUNTER
----- Message from Tia sent at 11/19/2024  1:37 PM CST -----  Who Called: Pt    What is the request in detail: Requesting call back to discuss New rx for needles as well. Please advise    [facility] [place] - [address] [facility]  [address] [facility]  [place] [address]  Phone: [phone] Fax: [phone]    Can the clinic reply by [facility]? No    Best Call Back Number: [phone]      Additional Information:

## 2024-11-19 NOTE — TELEPHONE ENCOUNTER
----- Message from Valeriy sent at 11/19/2024  9:35 AM CST -----  Contact: Shireen/daughter  Type:  RX Refill Request    Who Called: Shireen  Refill or New Rx:refill  RX Name and Strength:insulin (LANTUS SOLOSTAR U-100 INSULIN) glargine 100 units/mL SubQ pen and needles   How is the patient currently taking it? (ex. 1XDay):  Is this a 30 day or 90 day RX:  Preferred Pharmacy with phone number:  [facility] - [place], [place] - [address] [facility] [address]  Phone: [phone] Fax: [phone]      Local or Mail Order:local  Ordering Provider:patric  Would the patient rather a call back or a response via [facility]? call  Best Call Back Number:[phone]   Additional Information:

## 2024-11-19 NOTE — TELEPHONE ENCOUNTER
Care Due:                  Date            Visit Type   Department     Provider  --------------------------------------------------------------------------------                                EP -                              PRIMARY      [facility] FAMILY  Last Visit: 04-      CARE ([facility])   MEDICINE       Antoni Patel  Next Visit: None Scheduled  None         None Found                                                            Last  Test          Frequency    Reason                     Performed    Due Date  --------------------------------------------------------------------------------    HBA1C.......  6 months...  SUMEET, insulin.........  07- 01-    [facility] Embedded Care Due Messages. Reference number: [id number].   11/19/2024 10:06:20 AM CST

## 2024-11-20 ENCOUNTER — TELEPHONE (OUTPATIENT)
Dept: FAMILY MEDICINE | Facility: CLINIC | Age: 79
End: 2024-11-20
Payer: MEDICARE

## 2024-11-20 LAB
BACTERIA UR CULT: NORMAL
BACTERIA UR CULT: NORMAL

## 2024-11-20 NOTE — PROGRESS NOTES
1st check to see if patient has seen the results.  If not then  CALL patient with results and Document verification.  Schedule follow-up if needed.  [phone]      Urine culture shows multiple organisms are present.  Proceed with antibiotics as prescribed.  Follow-up sooner if no improvement in symptoms.  Consider Urology consult.  Increase hydration with water.

## 2024-12-16 ENCOUNTER — TELEPHONE (OUTPATIENT)
Dept: FAMILY MEDICINE | Facility: CLINIC | Age: 79
End: 2024-12-16
Payer: MEDICARE

## 2024-12-16 NOTE — TELEPHONE ENCOUNTER
Call returned. Pt daughter requested to have an antibiotic to be called in for her bedridden mother. Per pt daughter, pt speech is off and she just babbles and this is one of the main symptoms per pt daughter that her mother has presented when she has a UTI. She stated that her mother no longer receives HH and is currently on hospice. Pt daughter was advised that prior to sending in an antibiotic, a UA will be needed to ensure that the correct antibiotic is called in to treat the infection if the UA did come back positive. An e visit was offered, but pt daughter stated that her mother no longer use her My chart; last log in, 08/19/2024. Pt daughter verbalized understanding to the fact that a UA is needed prior to calling in an antibiotic. I am routing this message to you for further review since she is your pt and to see if you are willing to treat without a UA. Please advise.

## 2024-12-16 NOTE — TELEPHONE ENCOUNTER
----- Message from Jessica sent at 12/16/2024  8:26 AM CST -----  Regarding: medical advice  Contact: Shireen  .Type:  Needs Medical Advice    Who Called: Shireen   Symptoms (please be specific):    How long has patient had these symptoms:    Pharmacy name and phone #:    Would the patient rather a call back or a response via [facility]? call  Best Call Back Number: [phone]   Additional Information: Shireen is reporting the patient has another UTI and is bedridden. She would like to speak to you about having someone to come out and take a specimen.

## 2024-12-16 NOTE — TELEPHONE ENCOUNTER
Called and spoke w/Adrianna at [facility] and she stated that will be going out today to see this patient and take care of the UA and will treat pt accordingly. Thank you for your response, Dr. Patel.

## 2025-02-13 ENCOUNTER — PATIENT OUTREACH (OUTPATIENT)
Dept: ADMINISTRATIVE | Facility: HOSPITAL | Age: 80
End: 2025-02-13
Payer: MEDICARE

## 2025-02-13 NOTE — PROGRESS NOTES
Obituary received from clinic data. Uploaded to media.  date of 1/10/2025. Care teams updated and all appointment cancelled.